# Patient Record
Sex: FEMALE | Race: BLACK OR AFRICAN AMERICAN | Employment: OTHER | URBAN - METROPOLITAN AREA
[De-identification: names, ages, dates, MRNs, and addresses within clinical notes are randomized per-mention and may not be internally consistent; named-entity substitution may affect disease eponyms.]

---

## 2017-01-20 ENCOUNTER — CLINICAL SUPPORT (OUTPATIENT)
Dept: CARDIOLOGY CLINIC | Age: 62
End: 2017-01-20

## 2017-01-20 DIAGNOSIS — I48.91 ATRIAL FIBRILLATION, UNSPECIFIED TYPE (HCC): Primary | ICD-10-CM

## 2017-01-20 DIAGNOSIS — Z79.01 LONG TERM (CURRENT) USE OF ANTICOAGULANTS: ICD-10-CM

## 2017-02-06 ENCOUNTER — TELEPHONE (OUTPATIENT)
Dept: CARDIOLOGY CLINIC | Age: 62
End: 2017-02-06

## 2017-02-06 NOTE — TELEPHONE ENCOUNTER
Called patient. She gave me phone # to PCP. I called them for reference #. Ref # [id number]. Also had it faxed to our fax # at the nursing station. Called patient and let her know it was received. Patient verbalizes understanding and denies further questions or concerns.

## 2017-02-06 NOTE — TELEPHONE ENCOUNTER
Patient called regarding ref that was sent from PCP. She stated that they told her they faxed multiple times.  She would like for someone to call her at [phone]

## 2017-02-07 ENCOUNTER — CLINICAL SUPPORT (OUTPATIENT)
Dept: CARDIOLOGY CLINIC | Age: 62
End: 2017-02-07

## 2017-02-07 ENCOUNTER — OFFICE VISIT (OUTPATIENT)
Dept: CARDIOLOGY CLINIC | Age: 62
End: 2017-02-07

## 2017-02-07 VITALS
BODY MASS INDEX: 24.59 KG/M2 | WEIGHT: 114 LBS | RESPIRATION RATE: 16 BRPM | DIASTOLIC BLOOD PRESSURE: 62 MMHG | OXYGEN SATURATION: 97 % | HEIGHT: 57 IN | HEART RATE: 67 BPM | SYSTOLIC BLOOD PRESSURE: 110 MMHG

## 2017-02-07 DIAGNOSIS — E78.00 PURE HYPERCHOLESTEROLEMIA: ICD-10-CM

## 2017-02-07 DIAGNOSIS — Z79.01 LONG TERM (CURRENT) USE OF ANTICOAGULANTS: ICD-10-CM

## 2017-02-07 DIAGNOSIS — I25.10 CORONARY ARTERY DISEASE INVOLVING NATIVE CORONARY ARTERY OF NATIVE HEART WITHOUT ANGINA PECTORIS: Primary | ICD-10-CM

## 2017-02-07 DIAGNOSIS — I48.91 ATRIAL FIBRILLATION, UNSPECIFIED TYPE (HCC): Primary | ICD-10-CM

## 2017-02-07 DIAGNOSIS — I48.0 PAROXYSMAL ATRIAL FIBRILLATION (HCC): ICD-10-CM

## 2017-02-07 DIAGNOSIS — I10 ESSENTIAL HYPERTENSION, BENIGN: ICD-10-CM

## 2017-02-07 LAB
INR BLD: NORMAL
INR, EXTERNAL: 2.4 (ref 2–3)
PT POC: NORMAL SEC
VALID INTERNAL CONTROL?: YES

## 2017-02-07 NOTE — MR AVS SNAPSHOT
Visit Information Date & Time Provider Department Dept. Phone Encounter #  
 2/7/2017  3:20 PM Jason Tanner MD [facility] Ashley Gomez [phone] [id number] Follow-up Instructions Return in about 6 months (around 8/7/2017). Your Appointments 2/8/2017  2:00 PM  
New Patient with Madai Enriquez MD  
[facility] [address]) Appt Note: New Pt est PCP; r/s $0cp ls11/3/16; New Pt est PCP  
 [address] [place] [address] [address] [place] [id number]  
[phone]  
  
   
 [address] [facility] [address] [facility] Upcoming Health Maintenance Date Due Pneumococcal 19-64 Highest Risk (2 of 3 - PCV13) 1/1/2011 ZOSTER VACCINE AGE 60> 12/19/2013 PAP AKA CERVICAL CYTOLOGY 4/17/2016 COLONOSCOPY 12/27/2016 BREAST CANCER SCRN MAMMOGRAM 11/17/2017 DTaP/Tdap/Td series (2 - Td) 3/24/2025 Allergies as of 2/7/2017  Review Complete On: 2/7/2017 By: Jason Tanner MD  
  
 Severity Noted Reaction Type Reactions Amoxicillin Low 10/08/2013   Side Effect Diarrhea  
 1 day after Doxycycline Doxycycline Low 09/21/2013   Side Effect Diarrhea  
 100mg single dose \"too strong\" and caused diarrhea Current Immunizations  Reviewed on 5/5/2016 Name Date Influenza Vaccine 11/1/2015, 9/20/2013 Influenza Vaccine (Quad) PF 10/5/2016 Influenza Vaccine Split 9/1/2012 Influenza Vaccine Whole 9/1/2010 Pneumococcal Vaccine (Unspecified Type) 1/1/2010 Tdap 3/24/2015 Not reviewed this visit You Were Diagnosed With   
  
 Codes Comments Coronary artery disease involving native coronary artery of native heart without angina pectoris    -  Primary ICD-10-CM: I25.10 ICD-9-CM: 414.01 Paroxysmal atrial fibrillation (HCC)     ICD-10-CM: I48.0 ICD-9-CM: 427.31 Essential hypertension, benign     ICD-10-CM: I10 
ICD-9-CM: 401.1 Pure hypercholesterolemia     ICD-10-CM: E78.00 ICD-9-CM: 272.0 Vitals BP Pulse Resp Height(growth percentile) Weight(growth percentile) SpO2  
 110/62 (BP 1 Location: Left arm, BP Patient Position: Sitting) 67 16 4' 8.5\" (1.435 m) 114 lb (51.7 kg) 97% BMI OB Status Smoking Status 25.11 kg/m2 Hysterectomy Former Smoker BMI and BSA Data Body Mass Index Body Surface Area  
 25.11 kg/m 2 1.44 m 2 Preferred Pharmacy Pharmacy Name Phone [facility] [place] [id number] [phone] Your Updated Medication List  
  
   
This list is accurate as of: 2/7/17  3:28 PM.  Always use your most recent med list. amLODIPine 5 mg tablet Commonly known as:  Kuo Demario TAKE ONE TABLET BY MOUTH DAILY FOR BLOOD PRESSURE  
  
 aspirin 81 mg tablet Take 81 mg by mouth daily. * atorvastatin 80 mg tablet Commonly known as:  LIPITOR Take 1 Tab by mouth daily. * atorvastatin 80 mg tablet Commonly known as:  LIPITOR Take 1 Tab by mouth daily. digoxin 0.125 mg tablet Commonly known as:  LANOXIN Take 1 Tab by mouth daily. zvlxunl-iymtaazgc-sscshpsqrsdh -325 mg capsule Commonly known as:  Mitch Falter Take 1 Cap by mouth.  
  
 lansoprazole 30 mg capsule Commonly known as:  PREVACID Take 1 Cap by mouth Daily (before breakfast). Indications: HEARTBURN  
  
 lisinopril 40 mg tablet Commonly known as:  Esme Ariton Take 1 Tab by mouth daily. metoprolol tartrate 50 mg tablet Commonly known as:  LOPRESSOR Take 1 Tab by mouth three (3) times daily. rosuvastatin 40 mg tablet Commonly known as:  CRESTOR Take 1 Tab by mouth nightly. warfarin 4 mg tablet Commonly known as:  COUMADIN Take 1 Tab by mouth daily. zolpidem 5 mg tablet Commonly known as:  AMBIEN Take 5 mg by mouth nightly as needed for Sleep. * Notice:   This list has 2 medication(s) that are the same as other medications prescribed for you. Read the directions carefully, and ask your doctor or other care provider to review them with you. Follow-up Instructions Return in about 6 months (around 8/7/2017). Introducing [facility]! Anita Cullen introduces [employer] patient portal. Now you can access parts of your medical record, email your doctor's office, and request medication refills online. 1. In your internet browser, go to [email or web address] 2. Click on the First Time User? Click Here link in the Sign In box. You will see the New Member Sign Up page. 3. Enter your [employer] Access Code exactly as it appears below. You will not need to use this code after youve completed the sign-up process. If you do not sign up before the expiration date, you must request a new code. · [employer] Access Code: [email or web address] Expires: 5/8/2017  3:28 PM 
 
4. Enter the last four digits of your Social Security Number (xxxx) and Date of Birth (mm/dd/yyyy) as indicated and click Submit. You will be taken to the next sign-up page. 5. Create a [employer] ID. This will be your [employer] login ID and cannot be changed, so think of one that is secure and easy to remember. 6. Create a [employer] password. You can change your password at any time. 7. Enter your Password Reset Question and Answer. This can be used at a later time if you forget your password. 8. Enter your e-mail address. You will receive e-mail notification when new information is available in [address] 9. Click Sign Up. You can now view and download portions of your medical record. 10. Click the Download Summary menu link to download a portable copy of your medical information. If you have questions, please visit the Frequently Asked Questions section of the [employer] website. Remember, [employer] is NOT to be used for urgent needs. For medical emergencies, dial 911. Now available from your iPhone and Android! Please provide this summary of care documentation to your next provider. Your primary care clinician is listed as Isabel Stephens. If you have any questions after today's visit, please call [phone].

## 2017-02-07 NOTE — PROGRESS NOTES
HISTORY OF PRESENT ILLNESS  Reva Funes is a 59 y.o. female     SUMMARY:   Problem List  Date Reviewed: 2/7/2017          Codes Class Noted    MVP (mitral valve prolapse) ICD-10-CM: I34.1  ICD-9-CM: 424.0  6/4/2010    Overview Addendum 1/13/2011  8:11 AM by Aria Clark MD     Date of most recent echocardiogram was 12/23/08. Echocardiogram demonstrated normal LV wall motion and ejection fraction, left atrial enlargement, moderate mitral regurgitation and severe mitral regurgitation. The anterior leaflet was myxomatous and there was some mild prolapse             Pneumonia ICD-10-CM: J18.9  ICD-9-CM: 001  5/4/2016        Atrial fibrillation ([facility] ICD-10-CM: I48.91  ICD-9-CM: 427.31  5/4/2016        Alcohol abuse ICD-10-CM: F10.10  ICD-9-CM: 305.00  Unknown    Overview Signed 2/25/2016 10:43 PM by Leandra Cornejo     2/25/16 phone note             Elevated brain natriuretic peptide (BNP) level ICD-10-CM: R79.89  ICD-9-CM: 790.99  5/5/2015        Atrial myxoma ICD-10-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 Jamaal Gamble.              Cardiomyopathy ([facility] ICD-10-CM: I42.9  ICD-9-CM: 425.4  10/18/2013        Hyponatremia ICD-10-CM: E87.1  ICD-9-CM: 276.1  90/43/7572        Diastolic heart failure (HCC) ICD-10-CM: I50.30  ICD-9-CM: 428.30  10/17/2013        Back pain, thoracic ICD-10-CM: M54.6  ICD-9-CM: 724.1  9/25/2013        Elevated liver enzymes ICD-10-CM: R74.8  ICD-9-CM: 790.5  9/1/2013    Overview Signed 10/23/2013  6:07 PM by Valorie Cleary DO     AST, ALT, Alk P             CAD (coronary artery disease), native coronary artery ICD-10-CM: I25.10  ICD-9-CM: 414.01  6/22/2011    Overview Signed 6/22/2011 11:19 AM by Aria Clark MD     5/11 single vessel bypass to rca at time of mitral valve repair             Mitral regurgitation ICD-10-CM: I34.0  ICD-9-CM: 424.0  3/21/2011    Overview Addendum 6/22/2011 11:19 AM by Sobeida Chaney MD     Severe by RAKESH 4- leaflets both prolapse and do not coapt. 5/11 mitral valve repair with single svg to rca. Pure hypercholesterolemia ICD-10-CM: E78.00  ICD-9-CM: 272.0  10/19/2009        Essential hypertension, benign (Chronic) ICD-10-CM: I10  ICD-9-CM: 401.1  10/19/2009        Hematuria, unspecified ICD-10-CM: R31.9  ICD-9-CM: 599.70  10/19/2009        Diffuse cystic mastopathy ICD-10-CM: N60.19  ICD-9-CM: 610.1  10/19/2009        Osteopenia ICD-10-CM: M85.80  ICD-9-CM: 733.90  10/19/2009              Current Outpatient Prescriptions on File Prior to Visit   Medication Sig    digoxin (LANOXIN) 0.125 mg tablet Take 1 Tab by mouth daily.  rosuvastatin (CRESTOR) 40 mg tablet Take 1 Tab by mouth nightly.  lansoprazole (PREVACID) 30 mg capsule Take 1 Cap by mouth Daily (before breakfast). Indications: HEARTBURN    warfarin (COUMADIN) 4 mg tablet Take 1 Tab by mouth daily.  metoprolol tartrate (LOPRESSOR) 50 mg tablet Take 1 Tab by mouth three (3) times daily.  amLODIPine (NORVASC) 5 mg tablet TAKE ONE TABLET BY MOUTH DAILY FOR BLOOD PRESSURE    lisinopril (PRINIVIL, ZESTRIL) 40 mg tablet Take 1 Tab by mouth daily.  zolpidem (AMBIEN) 5 mg tablet Take 5 mg by mouth nightly as needed for Sleep.  aspirin 81 mg tablet Take 81 mg by mouth daily.  atorvastatin (LIPITOR) 80 mg tablet Take 1 Tab by mouth daily.  atorvastatin (LIPITOR) 80 mg tablet Take 1 Tab by mouth daily. No current facility-administered medications on file prior to visit.         CARDIOLOGY STUDIES TO DATE:  2/14 transthorasic echo lvef 45% brenda, intact mitral valve repair with mod mr, mod tr pa pressure 49mm, right atrial mass, sessile  3/14 RAKESH lvef 55% mild to mod mr, mild tr 2by 2cm right atrial mass near septum   5/16 echo normal lvef, brenda, normally functioning bioprosthetic mv, mild mr, mod tr pa pressure 67mm      Chief Complaint   Patient presents with    Irregular Heart Beat HPI : Ms. Oswaldo Gardner is doing well. She is taking all of her medications and keeping up with her Coumadin checks. She is retired from work but stays quite active with her family and takes care of her mother. CARDIAC ROS:   negative for chest pain, dyspnea, palpitations, syncope, orthopnea, paroxysmal nocturnal dyspnea, exertional chest pressure/discomfort, claudication, lower extremity edema    Family History   Problem Relation Age of Onset    Heart Surgery Mother      CABG    Hypertension Mother     High Cholesterol Mother     Cancer Father      bone    Diabetes Sister     High Cholesterol Brother     Cancer Maternal Grandmother        Past Medical History   Diagnosis Date    Alcohol abuse      2/25/16 phone note    Anasarca 10/18/13     per CT. Dr. Darshana Kendrick.  Atrial myxoma 06/06/14     Dr. Marielle Valencia.  CAD (coronary artery disease)     Diastolic heart failure ([facility] 10/15/13     Dr. Zabrina Addison.  Diverticulosis 12/27/11     Dr. Raul Vogel.  DJD (degenerative joint disease) of knee      Dr. Felisha Haas Elevated cholesterol     Elevated liver enzymes 09/2013     AST, ALT, Alk P    Heartburn     Hepatic steatosis 10/18/13    Hypertension     Low sodium 06/06/14     [facility]    Migraine     MVP (mitral valve prolapse)      w/MR  Dr. Lena Okeefe. Dr. Cameron Serrano. Dr. Marielle Valencia.  Osteopenia 03/04/08    Ovarian cyst, bilateral     PAF (paroxysmal atrial fibrillation) ([facility] 03/21/11     New. Dr. Rick Coleman. Dr. Cameron Serrano. Dr. Wild Valdez Severe mitral regurgitation by prior echocardiogram 03/21/11     Dr. Velia Diaz. Dr. Wild Valdez Uterine fibroid        GENERAL ROS:  A comprehensive review of systems was negative except for that written in the HPI.     Visit Vitals    /62 (BP 1 Location: Left arm, BP Patient Position: Sitting)    Pulse 67    Resp 16    Ht 4' 8.5\" (1.435 m)    Wt 114 lb (51.7 kg)    SpO2 97%    BMI 25.11 kg/m2       Wt Readings from Last 3 Encounters:   02/07/17 114 lb (51.7 kg)   10/26/16 114 lb 3.2 oz (51.8 kg)   10/05/16 113 lb 4.8 oz (51.4 kg)            BP Readings from Last 3 Encounters:   02/07/17 110/62   10/26/16 130/80   10/05/16 108/60       PHYSICAL EXAM  General appearance: alert, cooperative, no distress, appears stated age  Neck: supple, symmetrical, trachea midline, no adenopathy, no carotid bruit and no JVD  Lungs: clear to auscultation bilaterally  Heart: regular rate and rhythm, S1, S2 normal, no murmur, click, rub or gallop  Extremities: extremities normal, atraumatic, no cyanosis or edema    Lab Results   Component Value Date/Time    Cholesterol, total 223 10/20/2016 10:15 AM    Cholesterol, total 181 10/28/2015 10:27 AM    Cholesterol, total 192 03/27/2015 08:50 AM    Cholesterol, total 105 07/17/2014 04:24 AM    Cholesterol, total 145 06/18/2014 08:57 AM    HDL Cholesterol 57 10/20/2016 10:15 AM    HDL Cholesterol 68 10/28/2015 10:27 AM    HDL Cholesterol 61 03/27/2015 08:50 AM    HDL Cholesterol 45 07/17/2014 04:24 AM    HDL Cholesterol 53 06/18/2014 08:57 AM    LDL, calculated 154 10/20/2016 10:15 AM    LDL, calculated 94 10/28/2015 10:27 AM    LDL, calculated 111 03/27/2015 08:50 AM    LDL, calculated 45.2 07/17/2014 04:24 AM    LDL, calculated 79 06/18/2014 08:57 AM    Triglyceride 60 10/20/2016 10:15 AM    Triglyceride 96 10/28/2015 10:27 AM    Triglyceride 98 03/27/2015 08:50 AM    Triglyceride 74 07/17/2014 04:24 AM    Triglyceride 67 06/18/2014 08:57 AM    CHOL/HDL Ratio 2.3 07/17/2014 04:24 AM    CHOL/HDL Ratio 3.1 08/09/2011 04:25 AM     ASSESSMENT  Ms. Lily Rodriguez is stable, asymptomatic and well-compensated on a good medical regimen and needs no cardiac testing at this time. We will send her for a fasting lipid profile to see how she is doing on the Crestor.         current treatment plan is effective, no change in therapy  lab results and schedule of future lab studies reviewed with patient  reviewed diet, exercise and weight control    Encounter Diagnoses   Name Primary?  Coronary artery disease involving native coronary artery of native heart without angina pectoris Yes    Paroxysmal atrial fibrillation (HCC)     Essential hypertension, benign     Pure hypercholesterolemia      Orders Placed This Encounter    LIPID PANEL    METABOLIC PANEL, COMPREHENSIVE    gkuntmz-dfjdxzdud-ckoymtccclxm (MIDRIN) -325 mg capsule       Follow-up Disposition:  Return in about 4 months (around 6/7/2017).     Katie Ochoa MD  2/7/2017

## 2017-02-08 ENCOUNTER — OFFICE VISIT (OUTPATIENT)
Dept: INTERNAL MEDICINE CLINIC | Age: 62
End: 2017-02-08

## 2017-02-08 VITALS
OXYGEN SATURATION: 97 % | SYSTOLIC BLOOD PRESSURE: 123 MMHG | DIASTOLIC BLOOD PRESSURE: 75 MMHG | TEMPERATURE: 96.9 F | HEART RATE: 57 BPM | WEIGHT: 111.8 LBS | HEIGHT: 57 IN | RESPIRATION RATE: 20 BRPM | BODY MASS INDEX: 24.12 KG/M2

## 2017-02-08 DIAGNOSIS — M17.10 ARTHRITIS OF KNEE: ICD-10-CM

## 2017-02-08 DIAGNOSIS — M85.80 OSTEOPENIA: ICD-10-CM

## 2017-02-08 DIAGNOSIS — I10 ESSENTIAL HYPERTENSION, BENIGN: Chronic | ICD-10-CM

## 2017-02-08 DIAGNOSIS — I48.91 ATRIAL FIBRILLATION, UNSPECIFIED TYPE (HCC): Primary | ICD-10-CM

## 2017-02-08 DIAGNOSIS — E78.00 PURE HYPERCHOLESTEROLEMIA: ICD-10-CM

## 2017-02-08 DIAGNOSIS — I25.10 CORONARY ARTERY DISEASE INVOLVING NATIVE CORONARY ARTERY OF NATIVE HEART WITHOUT ANGINA PECTORIS: ICD-10-CM

## 2017-02-08 DIAGNOSIS — E55.9 VITAMIN D DEFICIENCY: ICD-10-CM

## 2017-02-08 RX ORDER — LANOLIN ALCOHOL/MO/W.PET/CERES
1 CREAM (GRAM) TOPICAL
Qty: 30 TAB | Refills: 12 | Status: SHIPPED | OUTPATIENT
Start: 2017-02-08 | End: 2018-05-04 | Stop reason: SDUPTHER

## 2017-02-08 NOTE — MR AVS SNAPSHOT
Visit Information Date & Time Provider Department Dept. Phone Encounter #  
 2/8/2017  2:00 PM Jose Carlos Armenta, [address] [facility] Medicine [phone] [id number] Your Appointments 3/8/2017 11:40 AM  
COUMADIN CLINIC with [facility] ([facility]AR[facility] SCHEDULING) Appt Note: 1 month coumadin check [address] [place] [address]  
[address]  
  
    
 6/6/2017  2:40 PM  
ESTABLISHED PATIENT with Fredo Jenkins MD  
[facility] ([facility] Appt Note: [address] [place] [address]  
[address] [address] Upcoming Health Maintenance Date Due Pneumococcal 19-64 Highest Risk (2 of 3 - PCV13) 1/1/2011 ZOSTER VACCINE AGE 60> 12/19/2013 PAP AKA CERVICAL CYTOLOGY 4/17/2016 COLONOSCOPY 12/27/2016 BREAST CANCER SCRN MAMMOGRAM 11/17/2017 DTaP/Tdap/Td series (2 - Td) 3/24/2025 Allergies as of 2/8/2017  Review Complete On: 2/8/2017 By: Jose Carlos Armenta MD  
  
 Severity Noted Reaction Type Reactions Amoxicillin Low 10/08/2013   Side Effect Diarrhea  
 1 day after Doxycycline Doxycycline Low 09/21/2013   Side Effect Diarrhea  
 100mg single dose \"too strong\" and caused diarrhea Current Immunizations  Reviewed on 5/5/2016 Name Date Influenza Vaccine 11/1/2015, 9/20/2013 Influenza Vaccine (Quad) PF 10/5/2016 Influenza Vaccine Split 9/1/2012 Influenza Vaccine Whole 9/1/2010 Pneumococcal Vaccine (Unspecified Type) 1/1/2010 Tdap 3/24/2015 Not reviewed this visit You Were Diagnosed With   
  
 Codes Comments Atrial fibrillation, unspecified type ([facility] [address]    -  Primary ICD-10-CM: I48.91 
ICD-9-CM: 427.31 Essential hypertension, benign     ICD-10-CM: I10 
ICD-9-CM: 401.1 Coronary artery disease involving native coronary artery of native heart without angina pectoris     ICD-10-CM: I25.10 ICD-9-CM: 414.01 Osteopenia     ICD-10-CM: M85.80 ICD-9-CM: 733.90 Pure hypercholesterolemia     ICD-10-CM: E78.00 ICD-9-CM: 272.0 Vitamin D deficiency     ICD-10-CM: E55.9 ICD-9-CM: 268.9 Arthritis of knee     ICD-10-CM: M19.90 ICD-9-CM: 716.96 Vitals BP Pulse Temp Resp Height(growth percentile) Weight(growth percentile) 123/75 (BP 1 Location: Left arm, BP Patient Position: Sitting) (!) 57 96.9 °F (36.1 °C) (Oral) 20 4' 8.5\" (1.435 m) 111 lb 12.8 oz (50.7 kg) SpO2 BMI OB Status Smoking Status 97% 24.62 kg/m2 Hysterectomy Former Smoker Vitals History BMI and BSA Data Body Mass Index Body Surface Area  
 24.62 kg/m 2 1.42 m 2 Preferred Pharmacy Pharmacy Name Phone [facility] [address] [address] [phone] Your Updated Medication List  
  
   
This list is accurate as of: 2/8/17  3:06 PM.  Always use your most recent med list. amLODIPine 5 mg tablet Commonly known as:  Noah Alstrom TAKE ONE TABLET BY MOUTH DAILY FOR BLOOD PRESSURE  
  
 aspirin 81 mg tablet Take 81 mg by mouth daily. atorvastatin 80 mg tablet Commonly known as:  LIPITOR Take 1 Tab by mouth daily. calcium citrate-vitamin d3 315-200 mg-unit Tab Commonly known as:  CITRACAL+D Take 1 Tab by mouth daily (with breakfast). digoxin 0.125 mg tablet Commonly known as:  LANOXIN Take 1 Tab by mouth daily. deloaxk-ggkxxmgky-pgzojmfpuusg -325 mg capsule Commonly known as:  Jolanta Peak Take 1 Cap by mouth.  
  
 lansoprazole 30 mg capsule Commonly known as:  PREVACID Take 1 Cap by mouth Daily (before breakfast). Indications: HEARTBURN  
  
 lisinopril 40 mg tablet Commonly known as:  Yulisa Loss Take 1 Tab by mouth daily. metoprolol tartrate 50 mg tablet Commonly known as:  LOPRESSOR Take 1 Tab by mouth three (3) times daily. rosuvastatin 40 mg tablet Commonly known as:  CRESTOR Take 1 Tab by mouth nightly. warfarin 4 mg tablet Commonly known as:  COUMADIN Take 1 Tab by mouth daily. zolpidem 5 mg tablet Commonly known as:  AMBIEN Take 5 mg by mouth nightly as needed for Sleep. Prescriptions Sent to Pharmacy Refills  
 calcium citrate-vitamin d3 (CITRACAL+D) 315-200 mg-unit tab 12 Sig: Take 1 Tab by mouth daily (with breakfast). Class: Normal  
 Pharmacy: [facility] [address], [address] [facility] #: [phone] Route: Oral  
  
We Performed the Following CBC WITH AUTOMATED DIFF [89512 CPT(R)] LIPID PANEL [66222 CPT(R)] METABOLIC PANEL, COMPREHENSIVE [59931 CPT(R)] URINALYSIS W/ RFLX MICROSCOPIC [84773 CPT(R)] VITAMIN D, 25 HYDROXY [id number] CPT(R)] To-Do List   
 05/08/2017 Imaging:  DEXA BONE DENSITY STUDY AXIAL Patient Instructions Knee Arthritis: Exercises Your Care Instructions Here are some examples of exercises for knee arthritis. Start each exercise slowly. Ease off the exercise if you start to have pain. Your doctor or physical therapist will tell you when you can start these exercises and which ones will work best for you. How to do the exercises Knee flexion with heel slide 1. Lie on your back with your knees bent. 2. Slide your heel back by bending your affected knee as far as you can. Then hook your other foot around your ankle to help pull your heel even farther back. 3. Hold for about 6 seconds, then rest for up to 10 seconds. 4. Repeat 8 to 12 times. 5. Switch legs and repeat steps 1 through 4, even if only one knee is sore. [facility] [facility] 1. Sit with your affected leg straight and supported on the floor or a firm bed. Place a small, rolled-up towel under your knee.  Your other leg should be bent, with that foot flat on the floor. 2. Tighten the thigh muscles of your affected leg by pressing the back of your knee down into the towel. 3. Hold for about 6 seconds, then rest for up to 10 seconds. 4. Repeat 8 to 12 times. 5. Switch legs and repeat steps 1 through 4, even if only one knee is sore. Straight-leg raises to the front 1. Lie on your back with your good knee bent so that your foot rests flat on the floor. Your affected leg should be straight. Make sure that your low back has a normal curve. You should be able to slip your hand in between the floor and the small of your back, with your palm touching the floor and your back touching the back of your hand. 2. Tighten the thigh muscles in your affected leg by pressing the back of your knee flat down to the floor. Hold your knee straight. 3. Keeping the thigh muscles tight and your leg straight, lift your affected leg up so that your heel is about 12 inches off the floor. Hold for about 6 seconds, then lower slowly. 4. Relax for up to 10 seconds between repetitions. 5. Repeat 8 to 12 times. 6. Switch legs and repeat steps 1 through 5, even if only one knee is sore. Active knee flexion 1. Lie on your stomach with your knees straight. If your kneecap is uncomfortable, roll up a washcloth and put it under your leg just above your kneecap. 2. Lift the foot of your affected leg by bending the knee so that you bring the foot up toward your buttock. If this motion hurts, try it without bending your knee quite as far. This may help you avoid any painful motion. 3. Slowly move your leg up and down. 4. Repeat 8 to 12 times. 5. Switch legs and repeat steps 1 through 4, even if only one knee is sore. Quadriceps stretch (facedown) 1. Lie flat on your stomach, and rest your face on the floor. 2. Wrap a towel or belt strap around the lower part of your affected leg. Then use the towel or belt strap to slowly pull your heel toward your buttock until you feel a stretch. 3. Hold for about 15 to 30 seconds, then relax your leg against the towel or belt strap. 4. Repeat 2 to 4 times. 5. Switch legs and repeat steps 1 through 4, even if only one knee is sore. Stationary exercise bike If you do not have a stationary exercise bike at home, you can find one to ride at your local health club or community center. 1. Adjust the height of the bike seat so that your knee is slightly bent when your leg is extended downward. If your knee hurts when the pedal reaches the top, you can raise the seat so that your knee does not bend as much. 2. Start slowly. At first, try to do 5 to 10 minutes of cycling with little to no resistance. Then increase your time and the resistance bit by bit until you can do 20 to 30 minutes without pain. 3. If you start to have pain, rest your knee until your pain gets back to the level that is normal for you. Or cycle for less time or with less effort. Follow-up care is a key part of your treatment and safety. Be sure to make and go to all appointments, and call your doctor if you are having problems. It's also a good idea to know your test results and keep a list of the medicines you take. Where can you learn more? Go to http://payam-laurence.info/. Enter C159 in the search box to learn more about \"Knee Arthritis: Exercises. \" Current as of: May 23, 2016 Content Version: 11.1 © 0483-3703 [employer]. Care instructions adapted under license by [employer] (which disclaims liability or warranty for this information). If you have questions about a medical condition or this instruction, always ask your healthcare professional. [place] [address] any warranty or liability for your use of this information. Introducing [facility]! Quoc Douglas introduces [employer] patient portal. Now you can access parts of your medical record, email your doctor's office, and request medication refills online. 1. In your internet browser, go to [email or web address] 2. Click on the First Time User? Click Here link in the Sign In box. You will see the New Member Sign Up page. 3. Enter your [employer] Access Code exactly as it appears below. You will not need to use this code after youve completed the sign-up process. If you do not sign up before the expiration date, you must request a new code. · [employer] Access Code: [email or web address] Expires: 5/8/2017  3:28 PM 
 
4. Enter the last four digits of your Social Security Number (xxxx) and Date of Birth (mm/dd/yyyy) as indicated and click Submit. You will be taken to the next sign-up page. 5. Create a [employer] ID. This will be your [employer] login ID and cannot be changed, so think of one that is secure and easy to remember. 6. Create a [employer] password. You can change your password at any time. 7. Enter your Password Reset Question and Answer. This can be used at a later time if you forget your password. 8. Enter your e-mail address. You will receive e-mail notification when new information is available in [address] 9. Click Sign Up. You can now view and download portions of your medical record. 10. Click the Download Summary menu link to download a portable copy of your medical information. If you have questions, please visit the Frequently Asked Questions section of the [employer] website. Remember, [employer] is NOT to be used for urgent needs. For medical emergencies, dial 911. Now available from your iPhone and Android! Please provide this summary of care documentation to your next provider. Your primary care clinician is listed as Mecca Casper. If you have any questions after today's visit, please call [phone].

## 2017-02-08 NOTE — PROGRESS NOTES
HISTORY OF PRESENT ILLNESS  Kenia Storey is a 59 y.o. female here to establish care. She has CAD,S/P CABG,A fib and atrial mixoma. Seen by cardiologist.doing well. no chest pain or palpitation. has elevated lipid too. has F/H CAD. On crestor. need lab work. Has knee arthritis,has seen ortho in past.  Reports compliance with medications and diet. Med list and most recent labs/studies reviewed with pt. active physically to control weight. Needs med refills. Reports no other new c/o. HPI    Review of Systems   Constitutional: Negative. HENT: Negative. Eyes: Negative. Respiratory: Negative. Cardiovascular: Negative. Gastrointestinal: Negative. Genitourinary: Negative. Musculoskeletal: Positive for joint pain. Skin: Negative. Neurological: Negative. Psychiatric/Behavioral: Negative. Physical Exam   Constitutional: She is oriented to person, place, and time. She appears well-developed and well-nourished. No distress. HENT:   Head: Normocephalic and atraumatic. Right Ear: External ear normal.   Left Ear: External ear normal.   Nose: Nose normal.   Mouth/Throat: Oropharynx is clear and moist. No oropharyngeal exudate. Neck: Normal range of motion. Neck supple. No JVD present. No thyromegaly present. Cardiovascular: Normal rate, regular rhythm, normal heart sounds and intact distal pulses. Pulmonary/Chest: Effort normal and breath sounds normal. No respiratory distress. She has no wheezes. She has no rales. Abdominal: Soft. Bowel sounds are normal. She exhibits no distension. There is no tenderness. Musculoskeletal: Normal range of motion. She exhibits no edema or tenderness. Neurological: She is alert and oriented to person, place, and time. She has normal reflexes. She displays normal reflexes. No cranial nerve deficit. She exhibits normal muscle tone. Coordination normal.   Psychiatric: She has a normal mood and affect.  Her behavior is normal.       ASSESSMENT and Jeison Mane was seen today for new patient, hypertension, coronary artery disease, irregular heart beat and cholesterol problem. Diagnoses and all orders for this visit:    Atrial fibrillation, unspecified type ([address]  Rate controlled. On meds. on coumadin too. Managed by cardiologist.  Will do,  -     CBC WITH AUTOMATED DIFF  -     METABOLIC PANEL, COMPREHENSIVE    Essential hypertension, benign    Stable. Will do,  -     CBC WITH AUTOMATED DIFF  -     METABOLIC PANEL, COMPREHENSIVE  -     URINALYSIS W/ RFLX MICROSCOPIC    Coronary artery disease involving native coronary artery of native heart without angina pectoris      Had CABG. repeat cath was OK. On meds. and statin. Osteopenia    Will do,   -     DEXA BONE DENSITY STUDY AXIAL; Future  -     calcium citrate-vitamin d3 (CITRACAL+D) 315-200 mg-unit tab; Take 1 Tab by mouth daily (with breakfast). Pure hypercholesterolemia    On crestor. Will check,  -     LIPID PANEL  Cardiologist wants to change to lipitor 80 mg . Vitamin D deficiency  -     VITAMIN D, 25 HYDROXY    Arthritis of knee    Tylenol PRN. Discussed expected course/resolution/complications of diagnosis in detail with patient. Medication risks/benefits/costs/interactions/alternatives discussed with patient. Pt was given an after visit summary which includes diagnoses, current medications & vitals. Pt expressed understanding with the diagnosis and plan.

## 2017-02-08 NOTE — PROGRESS NOTES
Health Maintenance Due   Topic Date Due    Pneumococcal 19-64 Highest Risk (2 of 3 - PCV13) 01/01/2011    ZOSTER VACCINE AGE 60>  12/19/2013    PAP AKA CERVICAL CYTOLOGY  04/17/2016    COLONOSCOPY  12/27/2016       Chief Complaint   Patient presents with    New Patient    Hypertension     with hx of MVP and mitral valve regurg    Coronary Artery Disease    Irregular Heart Beat    Cholesterol Problem       1. Have you been to the ER, urgent care clinic since your last visit? Hospitalized since your last visit? No    2. Have you seen or consulted any other health care providers outside of the [address] [place] [address] since your last visit? Include any pap smears or colon screening. No    3) Do you have an Advance Directive on file? no    4) Are you interested in receiving information on Advance Directives? NO      Patient is accompanied by self I have received verbal consent from Arley Griffin to discuss any/all medical information while they are present in the room.

## 2017-02-08 NOTE — LETTER
6/19/2017 8:39 AM 
 
Ms. Jaime Valentin [address] [facility] [place] [id number] Dear Jaime Valentin: 
 
Please find your most recent results below. Resulted Orders CBC WITH AUTOMATED DIFF Result Value Ref Range WBC 4.8 3.4 - 10.8 x10E3/uL  
 RBC 4.36 3.77 - 5.28 x10E6/uL HGB 13.4 11.1 - 15.9 g/dL HCT 39.5 34.0 - 46.6 % MCV 91 79 - 97 fL  
 MCH 30.7 26.6 - 33.0 pg  
 MCHC 33.9 31.5 - 35.7 g/dL  
 RDW 14.0 12.3 - 15.4 % PLATELET 849 090 - 680 x10E3/uL NEUTROPHILS 43 % Lymphocytes 45 % MONOCYTES 7 % EOSINOPHILS 3 % BASOPHILS 2 %  
 ABS. NEUTROPHILS 2.1 1.4 - 7.0 x10E3/uL Abs Lymphocytes 2.2 0.7 - 3.1 x10E3/uL  
 ABS. MONOCYTES 0.4 0.1 - 0.9 x10E3/uL  
 ABS. EOSINOPHILS 0.2 0.0 - 0.4 x10E3/uL  
 ABS. BASOPHILS 0.1 0.0 - 0.2 x10E3/uL IMMATURE GRANULOCYTES 0 %  
 ABS. IMM. GRANS. 0.0 0.0 - 0.1 x10E3/uL Narrative Performed at:  [address]  [id number] : Murray Emery MD, Phone:  [phone] LIPID PANEL Result Value Ref Range Cholesterol, total 188 100 - 199 mg/dL Triglyceride 69 0 - 149 mg/dL HDL Cholesterol 50 >39 mg/dL VLDL, calculated 14 5 - 40 mg/dL LDL, calculated 124 (H) 0 - 99 mg/dL Narrative Performed at:  [address]  [id number] : Murray Emery MD, Phone:  [phone] METABOLIC PANEL, COMPREHENSIVE Result Value Ref Range Glucose 70 65 - 99 mg/dL BUN 6 (L) 8 - 27 mg/dL Creatinine 0.86 0.57 - 1.00 mg/dL GFR est non-AA 72 >59 mL/min/1.73 GFR est AA 83 >59 mL/min/1.73  
 BUN/Creatinine ratio 7 (L) 12 - 28 Sodium 126 (L) 134 - 144 mmol/L Potassium 4.6 3.5 - 5.2 mmol/L Chloride 90 (L) 96 - 106 mmol/L  
 CO2 23 18 - 29 mmol/L Calcium 9.3 8.7 - 10.3 mg/dL Protein, total 7.5 6.0 - 8.5 g/dL Albumin 4.1 3.6 - 4.8 g/dL GLOBULIN, TOTAL 3.4 1.5 - 4.5 g/dL A-G Ratio 1.2 1.2 - 2.2 Bilirubin, total 1.1 0.0 - 1.2 mg/dL Alk. phosphatase 146 (H) 39 - 117 IU/L  
 AST (SGOT) 25 0 - 40 IU/L  
 ALT (SGPT) 11 0 - 32 IU/L Narrative Performed at:  [address]  [id number] : Asmita Elias MD, Phone:  [phone] VITAMIN D, 25 HYDROXY Result Value Ref Range VITAMIN D, 25-HYDROXY 33.3 30.0 - 100.0 ng/mL Comment:  
   Vitamin D deficiency has been defined by the [facility] practice guideline as a 
level of serum 25-OH vitamin D less than 20 ng/mL (1,2). The Endocrine Society went on to further define vitamin D 
insufficiency as a level between 21 and 29 ng/mL (2). 1. IOM ([place] of Medicine). 2010. Dietary reference 
   intakes for calcium and D. [address] [place] [address]: The 
   [employer]. 2. Nayely MF, Armani NC, Lorelei AHMADI, et al. 
   Evaluation, treatment, and prevention of vitamin D 
   deficiency: an Endocrine Society clinical practice 
   guideline. JCEM. 2011 Jul; 96(7):1911-30. Narrative Performed at:  [address]  [id number] : Asmita Elias MD, Phone:  [phone] URINALYSIS W/ RFLX MICROSCOPIC Result Value Ref Range Specific Gravity 1.026 1.005 - 1.030  
 pH (UA) 5.5 5.0 - 7.5 Color Orange Yellow Appearance Clear Clear Leukocyte Esterase Trace (A) Negative Protein 2+ (A) Negative/Trace Glucose Negative Negative Ketone Trace (A) Negative Blood Negative Negative Bilirubin Negative Negative Urobilinogen 1.0 0.2 - 1.0 mg/dL Nitrites Negative Negative Microscopic Examination See additional order Comment:  
   Microscopic was indicated and was performed. Narrative Performed at:  [address]  [id number] : Asmita Elias MD, Phone:  [phone] MICROSCOPIC EXAMINATION  
 Result Value Ref Range WBC 0-5 0 - 5 /hpf  
 RBC 0-2 0 - 2 /hpf Epithelial cells 0-10 0 - 10 /hpf Casts Present (A) None seen /lpf Cast type Hyaline casts N/A Mucus Present Not Estab. Bacteria None seen None seen/Few Narrative Performed at:  [address]  [id number] : Latosha Shelley MD, Phone:  [phone] CVD REPORT Result Value Ref Range INTERPRETATION Note Comment:  
   Supplement report is available. Narrative Performed at:  [address] [place] [address] [address] [place] [address]  [id number] : Jesus Morales PhD, Phone:  [phone] RECOMMENDATIONS: 
Eliz Vargas your labs indicate that you have some protein in your urine, please drink more fluids and we will repeat another urinalysis in 1 month. I have enclosed that lab slip for your convenience. All other labs are stable Please call me if you have any questions: [phone] Sincerely, Hang Najera MD

## 2017-02-08 NOTE — PATIENT INSTRUCTIONS
Knee Arthritis: Exercises  Your Care Instructions  Here are some examples of exercises for knee arthritis. Start each exercise slowly. Ease off the exercise if you start to have pain. Your doctor or physical therapist will tell you when you can start these exercises and which ones will work best for you. How to do the exercises  Knee flexion with heel slide    1. Lie on your back with your knees bent. 2. Slide your heel back by bending your affected knee as far as you can. Then hook your other foot around your ankle to help pull your heel even farther back. 3. Hold for about 6 seconds, then rest for up to 10 seconds. 4. Repeat 8 to 12 times. 5. Switch legs and repeat steps 1 through 4, even if only one knee is sore. Quad sets    1. Sit with your affected leg straight and supported on the floor or a firm bed. Place a small, rolled-up towel under your knee. Your other leg should be bent, with that foot flat on the floor. 2. Tighten the thigh muscles of your affected leg by pressing the back of your knee down into the towel. 3. Hold for about 6 seconds, then rest for up to 10 seconds. 4. Repeat 8 to 12 times. 5. Switch legs and repeat steps 1 through 4, even if only one knee is sore. Straight-leg raises to the front    1. Lie on your back with your good knee bent so that your foot rests flat on the floor. Your affected leg should be straight. Make sure that your low back has a normal curve. You should be able to slip your hand in between the floor and the small of your back, with your palm touching the floor and your back touching the back of your hand. 2. Tighten the thigh muscles in your affected leg by pressing the back of your knee flat down to the floor. Hold your knee straight. 3. Keeping the thigh muscles tight and your leg straight, lift your affected leg up so that your heel is about 12 inches off the floor. Hold for about 6 seconds, then lower slowly.   4. Relax for up to 10 seconds between repetitions. 5. Repeat 8 to 12 times. 6. Switch legs and repeat steps 1 through 5, even if only one knee is sore. Active knee flexion    1. Lie on your stomach with your knees straight. If your kneecap is uncomfortable, roll up a washcloth and put it under your leg just above your kneecap. 2. Lift the foot of your affected leg by bending the knee so that you bring the foot up toward your buttock. If this motion hurts, try it without bending your knee quite as far. This may help you avoid any painful motion. 3. Slowly move your leg up and down. 4. Repeat 8 to 12 times. 5. Switch legs and repeat steps 1 through 4, even if only one knee is sore. Quadriceps stretch (facedown)    1. Lie flat on your stomach, and rest your face on the floor. 2. Wrap a towel or belt strap around the lower part of your affected leg. Then use the towel or belt strap to slowly pull your heel toward your buttock until you feel a stretch. 3. Hold for about 15 to 30 seconds, then relax your leg against the towel or belt strap. 4. Repeat 2 to 4 times. 5. Switch legs and repeat steps 1 through 4, even if only one knee is sore. Stationary exercise bike    If you do not have a stationary exercise bike at home, you can find one to ride at your local health club or community center. 1. Adjust the height of the bike seat so that your knee is slightly bent when your leg is extended downward. If your knee hurts when the pedal reaches the top, you can raise the seat so that your knee does not bend as much. 2. Start slowly. At first, try to do 5 to 10 minutes of cycling with little to no resistance. Then increase your time and the resistance bit by bit until you can do 20 to 30 minutes without pain. 3. If you start to have pain, rest your knee until your pain gets back to the level that is normal for you. Or cycle for less time or with less effort. Follow-up care is a key part of your treatment and safety.  Be sure to make and go to all appointments, and call your doctor if you are having problems. It's also a good idea to know your test results and keep a list of the medicines you take. Where can you learn more? Go to http://payam-laurence.info/. Enter C159 in the search box to learn more about \"Knee Arthritis: Exercises. \"  Current as of: May 23, 2016  Content Version: 11.1  © 6214-8278 [employer] "[employer]. Care instructions adapted under license by [employer] (which disclaims liability or warranty for this information). If you have questions about a medical condition or this instruction, always ask your healthcare professional. [address] any warranty or liability for your use of this information.

## 2017-02-21 ENCOUNTER — HOSPITAL ENCOUNTER (OUTPATIENT)
Dept: MAMMOGRAPHY | Age: 62
Discharge: HOME | End: 2017-02-21
Attending: INTERNAL MEDICINE
Payer: COMMERCIAL

## 2017-02-21 DIAGNOSIS — M85.80 OSTEOPENIA: ICD-10-CM

## 2017-02-21 PROCEDURE — 77080 DXA BONE DENSITY AXIAL: CPT

## 2017-02-21 NOTE — LETTER
2/27/2017 9:23 AM 
 
Ms. Aydee Cooper [address] [facility] [address] Dear Aydee Cooper: 
 
Please find your most recent results below. Resulted Orders DEXA BONE DENSITY STUDY AXIAL Narrative Bone Mineral Density Indication:  F/U osteopenia Age: 59 Sex: Female. Menopause status: Postmenopausal. 
Hormone replacement therapy: No  
 
Number of falls in the past year:   None. Risk factors for osteoporosis:  None. Current medication for osteoporosis: None. Comparison: None. Technique: Imaging was performed on the [address] [place] [address] 
meta-analysis fracture risk calculator (FRAX) analysis was performed for 10 year 
fracture risk probability assessment Excluded sites: None Findings: 
  
 
Femoral Neck:  Left Bone mineral density (gm/cm2):? 0.819 
% of peak bone mass: 79 
% for age matched controls:? 88 
T-score: -1.6 Z-score: -0.8 Total Hip: Left Bone mineral density (gm/cm2):  0.839 
% of peak bone mass:   83 
% for age matched controls:  80 
T-score:   -1.3 Z-score:  -0.9 Lumbar Spine:  L1-L4 Bone mineral density (gm/cm2):  0.949 
% of peak bone mass:  80  
% for age matched controls:  80 
T-score:  -2.0 Z-score:  -0.7 Impression Impression: This patient is osteopenic using the World Health Organization criteria 10 year probability of major osteoporotic fracture:  4% 
10 year probability of hip fracture:  0.7% Recommendations: 
Therapy recommendations need to be tailored to each individual patient. Using 
the [address]) FRAX absolute fracture algorithm, the 
[address] [place] [address] recommends beginning pharmacological therapy in 
postmenopausal women and men over the age of 48 with a 8 year probability of a 
hip fracture of >3% OR with the 10 year probability of a major osteoporotic 
fracture of >20%. Please reconsider testing based on risk factors.  Currently, Medicare will only 
reimburse for a central DXA examination every two years, unless the patient is 
on chronic glucocorticoid therapy. Note: Please note that reliable, valid comparisons cannot be made between 
studies which have been performed on machines from different manufacturers. If 
clinically warranted, a follow up study performed at this site, on the same 
unit, would allow the most sensitive assessment of change in bone mineral 
density. RECOMMENDATIONS: 
Mary Jo Johnson  recent Dexa Bone Density test showed Osteopenia (thinning of the bones). I recommend for you to take Caltrate 600 mg one tablet twice per day. Caltrate is sold over the counter and you may ask the pharmacist if they have a generic of the Caltrate. Please call me if you have any questions. Sincerely, Please call me if you have any questions: [phone] Sincerely, [facility] DEXA 1

## 2017-03-09 ENCOUNTER — CLINICAL SUPPORT (OUTPATIENT)
Dept: CARDIOLOGY CLINIC | Age: 62
End: 2017-03-09

## 2017-03-09 DIAGNOSIS — I48.0 PAROXYSMAL ATRIAL FIBRILLATION (HCC): Primary | ICD-10-CM

## 2017-03-09 DIAGNOSIS — Z79.01 LONG TERM (CURRENT) USE OF ANTICOAGULANTS: ICD-10-CM

## 2017-03-09 LAB
INR BLD: NORMAL
INR, EXTERNAL: 2.2 (ref 2–3)
PT POC: NORMAL SEC
VALID INTERNAL CONTROL?: YES

## 2017-05-16 DIAGNOSIS — I34.1 MVP (MITRAL VALVE PROLAPSE): ICD-10-CM

## 2017-05-16 DIAGNOSIS — I10 ESSENTIAL HYPERTENSION, BENIGN: ICD-10-CM

## 2017-05-16 RX ORDER — LISINOPRIL 40 MG/1
40 TABLET ORAL DAILY
Qty: 90 TAB | Refills: 2 | Status: SHIPPED | OUTPATIENT
Start: 2017-05-16 | End: 2017-05-17 | Stop reason: SDUPTHER

## 2017-05-16 NOTE — TELEPHONE ENCOUNTER
Requested Prescriptions     Signed Prescriptions Disp Refills    lisinopril (PRINIVIL, ZESTRIL) 40 mg tablet 90 Tab 2     Sig: Take 1 Tab by mouth daily.      Authorizing Provider: Radha Cornejo     Ordering User: Antonieta Caba    Per Dr. Arun Senior verbal orders

## 2017-05-23 DIAGNOSIS — E87.1 HYPONATREMIA: ICD-10-CM

## 2017-05-23 DIAGNOSIS — I48.0 PAROXYSMAL ATRIAL FIBRILLATION (HCC): ICD-10-CM

## 2017-05-23 DIAGNOSIS — I25.10 CORONARY ARTERY DISEASE INVOLVING NATIVE CORONARY ARTERY OF NATIVE HEART WITHOUT ANGINA PECTORIS: ICD-10-CM

## 2017-05-23 DIAGNOSIS — I10 ESSENTIAL HYPERTENSION, BENIGN: ICD-10-CM

## 2017-05-23 RX ORDER — WARFARIN 4 MG/1
TABLET ORAL
Qty: 60 TAB | Refills: 0 | Status: SHIPPED | OUTPATIENT
Start: 2017-05-23 | End: 2017-08-10 | Stop reason: SDUPTHER

## 2017-06-08 ENCOUNTER — OFFICE VISIT (OUTPATIENT)
Dept: INTERNAL MEDICINE CLINIC | Age: 62
End: 2017-06-08

## 2017-06-08 VITALS
OXYGEN SATURATION: 98 % | HEART RATE: 52 BPM | HEIGHT: 57 IN | TEMPERATURE: 96.7 F | RESPIRATION RATE: 14 BRPM | SYSTOLIC BLOOD PRESSURE: 113 MMHG | WEIGHT: 108 LBS | BODY MASS INDEX: 23.3 KG/M2 | DIASTOLIC BLOOD PRESSURE: 58 MMHG

## 2017-06-08 DIAGNOSIS — M85.89 OSTEOPENIA OF MULTIPLE SITES: ICD-10-CM

## 2017-06-08 DIAGNOSIS — I25.10 CORONARY ARTERY DISEASE INVOLVING NATIVE CORONARY ARTERY OF NATIVE HEART WITHOUT ANGINA PECTORIS: ICD-10-CM

## 2017-06-08 DIAGNOSIS — I48.0 PAROXYSMAL ATRIAL FIBRILLATION (HCC): ICD-10-CM

## 2017-06-08 DIAGNOSIS — R00.1 BRADYCARDIA: ICD-10-CM

## 2017-06-08 DIAGNOSIS — I10 ESSENTIAL HYPERTENSION, BENIGN: Primary | Chronic | ICD-10-CM

## 2017-06-08 NOTE — PATIENT INSTRUCTIONS

## 2017-06-08 NOTE — MR AVS SNAPSHOT
Visit Information Date & Time Provider Department Dept. Phone Encounter #  
 6/8/2017  1:15 PM Narciso Feng MD [facility] [phone] [id number] Your Appointments 6/12/2017 10:40 AM  
ESTABLISHED PATIENT with Rebekah Ordaz MD  
[facility] ([facility]) Appt Note: 4 month f/u per dr Roosevelt Arndt; 4 month follow up; 4 month f/u per dr Roosevelt Arndt pt r/s from 6/6  
 [address] [place] [address] [address] [facility],[address] [facility] 7 [id number] Upcoming Health Maintenance Date Due ZOSTER VACCINE AGE 60> 12/19/2013 PAP AKA CERVICAL CYTOLOGY 4/17/2016 COLONOSCOPY 12/27/2016 INFLUENZA AGE 9 TO ADULT 8/1/2017 BREAST CANCER SCRN MAMMOGRAM 11/17/2017 DTaP/Tdap/Td series (2 - Td) 3/24/2025 Allergies as of 6/8/2017  Review Complete On: 6/8/2017 By: Narciso Feng MD  
  
 Severity Noted Reaction Type Reactions Amoxicillin Low 10/08/2013   Side Effect Diarrhea  
 1 day after Doxycycline Doxycycline Low 09/21/2013   Side Effect Diarrhea  
 100mg single dose \"too strong\" and caused diarrhea Current Immunizations  Reviewed on 6/8/2017 Name Date Influenza Vaccine 11/1/2015, 9/20/2013 Influenza Vaccine (Quad) PF 10/5/2016 Influenza Vaccine Split 9/1/2012 Influenza Vaccine Whole 9/1/2010 Pneumococcal Vaccine (Unspecified Type) 1/1/2010 Tdap 3/24/2015 Reviewed by Narciso Feng MD on 6/8/2017 at  1:38 PM  
You Were Diagnosed With   
  
 Codes Comments Essential hypertension, benign    -  Primary ICD-10-CM: I10 
ICD-9-CM: 401.1 Coronary artery disease involving native coronary artery of native heart without angina pectoris     ICD-10-CM: I25.10 ICD-9-CM: 414.01 Paroxysmal atrial fibrillation (HCC)     ICD-10-CM: I48.0 ICD-9-CM: 427.31 Osteopenia of multiple sites     ICD-10-CM: M85.89 ICD-9-CM: 733.90 Vitals BP Pulse Temp Resp Height(growth percentile) Weight(growth percentile) 113/58 (BP 1 Location: Left arm, BP Patient Position: Sitting) (!) 52 96.7 °F (35.9 °C) (Oral) 14 4' 8.5\" (1.435 m) 108 lb (49 kg) SpO2 BMI OB Status Smoking Status 98% 23.79 kg/m2 Hysterectomy Former Smoker Vitals History BMI and BSA Data Body Mass Index Body Surface Area  
 23.79 kg/m 2 1.4 m 2 Preferred Pharmacy Pharmacy Name Phone [facility] [phone] Your Updated Medication List  
  
   
This list is accurate as of: 6/8/17  1:40 PM.  Always use your most recent med list. amLODIPine 5 mg tablet Commonly known as:  Eward Veronique TAKE ONE TABLET BY MOUTH DAILY FOR BLOOD PRESSURE  
  
 aspirin 81 mg tablet Take 81 mg by mouth daily. atorvastatin 80 mg tablet Commonly known as:  LIPITOR Take 1 Tab by mouth daily. calcium citrate-vitamin d3 315-200 mg-unit Tab Commonly known as:  CITRACAL+D Take 1 Tab by mouth daily (with breakfast). digoxin 0.125 mg tablet Commonly known as:  LANOXIN Take 1 Tab by mouth daily. getyuzs-odrknvhnk-aqgwtvcfclgj -325 mg capsule Commonly known as:  Dolly Marcelle Take 1 Cap by mouth.  
  
 lansoprazole 30 mg capsule Commonly known as:  PREVACID Take 1 Cap by mouth Daily (before breakfast). Indications: HEARTBURN  
  
 lisinopril 40 mg tablet Commonly known as:  Inna Gaster Take 1 Tab by mouth daily. metoprolol tartrate 50 mg tablet Commonly known as:  LOPRESSOR Take 1 Tab by mouth three (3) times daily. rosuvastatin 40 mg tablet Commonly known as:  CRESTOR Take 1 Tab by mouth nightly. warfarin 4 mg tablet Commonly known as:  COUMADIN  
2 tablets daily or as directed by the coumadin clinic.  
  
 zolpidem 5 mg tablet Commonly known as:  AMBIEN Take 5 mg by mouth nightly as needed for Sleep. Patient Instructions DASH Diet: Care Instructions Your Care Instructions The DASH diet is an eating plan that can help lower your blood pressure. DASH stands for Dietary Approaches to Stop Hypertension. Hypertension is high blood pressure. The DASH diet focuses on eating foods that are high in calcium, potassium, and magnesium. These nutrients can lower blood pressure. The foods that are highest in these nutrients are fruits, vegetables, low-fat dairy products, nuts, seeds, and legumes. But taking calcium, potassium, and magnesium supplements instead of eating foods that are high in those nutrients does not have the same effect. The DASH diet also includes whole grains, fish, and poultry. The DASH diet is one of several lifestyle changes your doctor may recommend to lower your high blood pressure. Your doctor may also want you to decrease the amount of sodium in your diet. Lowering sodium while following the DASH diet can lower blood pressure even further than just the DASH diet alone. Follow-up care is a key part of your treatment and safety. Be sure to make and go to all appointments, and call your doctor if you are having problems. It's also a good idea to know your test results and keep a list of the medicines you take. How can you care for yourself at home? Following the DASH diet · Eat 4 to 5 servings of fruit each day. A serving is 1 medium-sized piece of fruit, ½ cup chopped or canned fruit, 1/4 cup dried fruit, or 4 ounces (½ cup) of fruit juice. Choose fruit more often than fruit juice. · Eat 4 to 5 servings of vegetables each day. A serving is 1 cup of lettuce or raw leafy vegetables, ½ cup of chopped or cooked vegetables, or 4 ounces (½ cup) of vegetable juice. Choose vegetables more often than vegetable juice. · Get 2 to 3 servings of low-fat and fat-free dairy each day. A serving is 8 ounces of milk, 1 cup of yogurt, or 1 ½ ounces of cheese. · Eat 6 to 8 servings of grains each day. A serving is 1 slice of bread, 1 ounce of dry cereal, or ½ cup of cooked rice, pasta, or cooked cereal. Try to choose whole-grain products as much as possible. · Limit lean meat, poultry, and fish to 2 servings each day. A serving is 3 ounces, about the size of a deck of cards. · Eat 4 to 5 servings of nuts, seeds, and legumes (cooked dried beans, lentils, and split peas) each week. A serving is 1/3 cup of nuts, 2 tablespoons of seeds, or ½ cup of cooked beans or peas. · Limit fats and oils to 2 to 3 servings each day. A serving is 1 teaspoon of vegetable oil or 2 tablespoons of salad dressing. · Limit sweets and added sugars to 5 servings or less a week. A serving is 1 tablespoon jelly or jam, ½ cup sorbet, or 1 cup of lemonade. · Eat less than 2,300 milligrams (mg) of sodium a day. If you limit your sodium to 1,500 mg a day, you can lower your blood pressure even more. Tips for success · Start small. Do not try to make dramatic changes to your diet all at once. You might feel that you are missing out on your favorite foods and then be more likely to not follow the plan. Make small changes, and stick with them. Once those changes become habit, add a few more changes. · Try some of the following: ¨ Make it a goal to eat a fruit or vegetable at every meal and at snacks. This will make it easy to get the recommended amount of fruits and vegetables each day. ¨ Try yogurt topped with fruit and nuts for a snack or healthy dessert. ¨ Add lettuce, tomato, cucumber, and onion to sandwiches. ¨ Combine a ready-made pizza crust with low-fat mozzarella cheese and lots of vegetable toppings. Try using tomatoes, squash, spinach, broccoli, carrots, cauliflower, and onions. ¨ Have a variety of cut-up vegetables with a low-fat dip as an appetizer instead of chips and dip. ¨ Sprinkle sunflower seeds or chopped almonds over salads.  Or try adding chopped walnuts or almonds to cooked vegetables. ¨ Try some vegetarian meals using beans and peas. Add garbanzo or kidney beans to salads. Make burritos and tacos with mashed sheppard beans or black beans. Where can you learn more? Go to http://payam-laurence.info/. Enter IWyatt in the search box to learn more about \"DASH Diet: Care Instructions. \" Current as of: March 23, 2016 Content Version: 11.2 © 8159-7003 [employer]. Care instructions adapted under license by [employer] (which disclaims liability or warranty for this information). If you have questions about a medical condition or this instruction, always ask your healthcare professional. [address] any warranty or liability for your use of this information. Introducing [facility]! Keisha Marrero introduces [employer] patient portal. Now you can access parts of your medical record, email your doctor's office, and request medication refills online. 1. In your internet browser, go to [email or web address] 2. Click on the First Time User? Click Here link in the Sign In box. You will see the New Member Sign Up page. 3. Enter your [employer] Access Code exactly as it appears below. You will not need to use this code after youve completed the sign-up process. If you do not sign up before the expiration date, you must request a new code. · [employer] Access Code: [email or web address] Expires: 9/6/2017  1:40 PM 
 
4. Enter the last four digits of your Social Security Number (xxxx) and Date of Birth (mm/dd/yyyy) as indicated and click Submit. You will be taken to the next sign-up page. 5. Create a Business e via Italyt ID. This will be your [employer] login ID and cannot be changed, so think of one that is secure and easy to remember. 6. Create a [employer] password. You can change your password at any time. 7. Enter your Password Reset Question and Answer.  This can be used at a later time if you forget your password. 8. Enter your e-mail address. You will receive e-mail notification when new information is available in [address] 9. Click Sign Up. You can now view and download portions of your medical record. 10. Click the Download Summary menu link to download a portable copy of your medical information. If you have questions, please visit the Frequently Asked Questions section of the [employer] website. Remember, [employer] is NOT to be used for urgent needs. For medical emergencies, dial 911. Now available from your iPhone and Android! Please provide this summary of care documentation to your next provider. Your primary care clinician is listed as Johana Spaulding. If you have any questions after today's visit, please call [phone].

## 2017-06-08 NOTE — PROGRESS NOTES
1. Have you been to the ER, urgent care clinic since your last visit? Hospitalized since your last visit? No    2. Have you seen or consulted any other health care providers outside of the [facility] since your last visit? Include any pap smears or colon screening.  No     Chief Complaint   Patient presents with    Hypertension    Cholesterol Problem    Irregular Heart Beat     fasting

## 2017-06-08 NOTE — PROGRESS NOTES
HISTORY OF PRESENT ILLNESS  Armando Don is a 59 y.o. female here for follow up.doign well.has CAD and HTN,on med.scompalint with meds. No chest pain or SOB. Has A fib,on coumadin,asking if she has to take it life long. no bleeding episode. Noticed to have slow heart rate. No dizziness or LOC. She is on high dose ot metoprolol 3 times a day. She will see cardiologist soon. all labs reviewed. HPI    Review of Systems   Constitutional: Negative. HENT: Negative. Eyes: Negative. Respiratory: Negative. Cardiovascular: Negative. Gastrointestinal: Negative. Genitourinary: Negative. Skin: Negative. Neurological: Negative. Psychiatric/Behavioral: Negative. Physical Exam   Constitutional: She appears well-developed and well-nourished. No distress. Neck: Normal range of motion. Neck supple. No JVD present. No thyromegaly present. Cardiovascular: Normal rate, regular rhythm, normal heart sounds and intact distal pulses. Pulmonary/Chest: Effort normal and breath sounds normal. No respiratory distress. She has no wheezes. Musculoskeletal: She exhibits no edema or tenderness. Psychiatric: She has a normal mood and affect. Her behavior is normal.       JANINE and Heidy Roldan was seen today for hypertension, cholesterol problem and irregular heart beat. Diagnoses and all orders for this visit:    Essential hypertension, benign    On meds. stable. need lab work. Coronary artery disease involving native coronary artery of native heart without angina pectoris    Stable. on ASA and crestor. will see Olaf Mcfarland soon. Paroxysmal atrial fibrillation (HCC)    On coumadin and dig and metoprolol. Stable. Osteopenia of multiple sites    On calcium. Bradycardia    HR go down in 40's.might reduce metoprolol dosage to BID.adv pt to ask . Discussed expected course/resolution/complications of diagnosis in detail with patient.    Medication risks/benefits/costs/interactions/alternatives discussed with patient. Pt was given an after visit summary which includes diagnoses, current medications & vitals. Pt expressed understanding with the diagnosis and plan.

## 2017-06-09 LAB
25(OH)D3+25(OH)D2 SERPL-MCNC: 33.3 NG/ML (ref 30–100)
ALBUMIN SERPL-MCNC: 4.1 G/DL (ref 3.6–4.8)
ALBUMIN/GLOB SERPL: 1.2 {RATIO} (ref 1.2–2.2)
ALP SERPL-CCNC: 146 IU/L (ref 39–117)
ALT SERPL-CCNC: 11 IU/L (ref 0–32)
APPEARANCE UR: CLEAR
AST SERPL-CCNC: 25 IU/L (ref 0–40)
BACTERIA #/AREA URNS HPF: ABNORMAL /[HPF]
BASOPHILS # BLD AUTO: 0.1 X10E3/UL (ref 0–0.2)
BASOPHILS NFR BLD AUTO: 2 %
BILIRUB SERPL-MCNC: 1.1 MG/DL (ref 0–1.2)
BILIRUB UR QL STRIP: NEGATIVE
BUN SERPL-MCNC: 6 MG/DL (ref 8–27)
BUN/CREAT SERPL: 7 (ref 12–28)
CALCIUM SERPL-MCNC: 9.3 MG/DL (ref 8.7–10.3)
CASTS URNS MICRO: ABNORMAL
CASTS URNS QL MICRO: PRESENT /LPF
CHLORIDE SERPL-SCNC: 90 MMOL/L (ref 96–106)
CHOLEST SERPL-MCNC: 188 MG/DL (ref 100–199)
CO2 SERPL-SCNC: 23 MMOL/L (ref 18–29)
COLOR UR: ABNORMAL
CREAT SERPL-MCNC: 0.86 MG/DL (ref 0.57–1)
EOSINOPHIL # BLD AUTO: 0.2 X10E3/UL (ref 0–0.4)
EOSINOPHIL NFR BLD AUTO: 3 %
EPI CELLS #/AREA URNS HPF: ABNORMAL /HPF
ERYTHROCYTE [DISTWIDTH] IN BLOOD BY AUTOMATED COUNT: 14 % (ref 12.3–15.4)
GLOBULIN SER CALC-MCNC: 3.4 G/DL (ref 1.5–4.5)
GLUCOSE SERPL-MCNC: 70 MG/DL (ref 65–99)
GLUCOSE UR QL: NEGATIVE
HCT VFR BLD AUTO: 39.5 % (ref 34–46.6)
HDLC SERPL-MCNC: 50 MG/DL
HGB BLD-MCNC: 13.4 G/DL (ref 11.1–15.9)
HGB UR QL STRIP: NEGATIVE
IMM GRANULOCYTES # BLD: 0 X10E3/UL (ref 0–0.1)
IMM GRANULOCYTES NFR BLD: 0 %
INTERPRETATION, 910389: NORMAL
KETONES UR QL STRIP: ABNORMAL
LDLC SERPL CALC-MCNC: 124 MG/DL (ref 0–99)
LEUKOCYTE ESTERASE UR QL STRIP: ABNORMAL
LYMPHOCYTES # BLD AUTO: 2.2 X10E3/UL (ref 0.7–3.1)
LYMPHOCYTES NFR BLD AUTO: 45 %
MCH RBC QN AUTO: 30.7 PG (ref 26.6–33)
MCHC RBC AUTO-ENTMCNC: 33.9 G/DL (ref 31.5–35.7)
MCV RBC AUTO: 91 FL (ref 79–97)
MICRO URNS: ABNORMAL
MONOCYTES # BLD AUTO: 0.4 X10E3/UL (ref 0.1–0.9)
MONOCYTES NFR BLD AUTO: 7 %
MUCOUS THREADS URNS QL MICRO: PRESENT
NEUTROPHILS # BLD AUTO: 2.1 X10E3/UL (ref 1.4–7)
NEUTROPHILS NFR BLD AUTO: 43 %
NITRITE UR QL STRIP: NEGATIVE
PH UR STRIP: 5.5 [PH] (ref 5–7.5)
PLATELET # BLD AUTO: 260 X10E3/UL (ref 150–379)
POTASSIUM SERPL-SCNC: 4.6 MMOL/L (ref 3.5–5.2)
PROT SERPL-MCNC: 7.5 G/DL (ref 6–8.5)
PROT UR QL STRIP: ABNORMAL
RBC # BLD AUTO: 4.36 X10E6/UL (ref 3.77–5.28)
RBC #/AREA URNS HPF: ABNORMAL /HPF
SODIUM SERPL-SCNC: 126 MMOL/L (ref 134–144)
SP GR UR: 1.03 (ref 1–1.03)
TRIGL SERPL-MCNC: 69 MG/DL (ref 0–149)
UROBILINOGEN UR STRIP-MCNC: 1 MG/DL (ref 0.2–1)
VLDLC SERPL CALC-MCNC: 14 MG/DL (ref 5–40)
WBC # BLD AUTO: 4.8 X10E3/UL (ref 3.4–10.8)
WBC #/AREA URNS HPF: ABNORMAL /HPF

## 2017-06-19 DIAGNOSIS — R80.9 PROTEINURIA, UNSPECIFIED TYPE: Primary | ICD-10-CM

## 2017-06-19 NOTE — PROGRESS NOTES
Protein in urine with mild UTI.need to drink more fluid. repeat UA in 1month. lipid is better. all other labs are stable.

## 2017-07-19 DIAGNOSIS — R80.9 PROTEINURIA, UNSPECIFIED TYPE: ICD-10-CM

## 2017-08-10 ENCOUNTER — CLINICAL SUPPORT (OUTPATIENT)
Dept: CARDIOLOGY CLINIC | Age: 62
End: 2017-08-10

## 2017-08-10 DIAGNOSIS — I25.10 CORONARY ARTERY DISEASE INVOLVING NATIVE CORONARY ARTERY OF NATIVE HEART WITHOUT ANGINA PECTORIS: ICD-10-CM

## 2017-08-10 DIAGNOSIS — Z79.01 LONG TERM (CURRENT) USE OF ANTICOAGULANTS: Primary | ICD-10-CM

## 2017-08-10 DIAGNOSIS — E87.1 HYPONATREMIA: ICD-10-CM

## 2017-08-10 DIAGNOSIS — I10 ESSENTIAL HYPERTENSION, BENIGN: ICD-10-CM

## 2017-08-10 DIAGNOSIS — I48.0 PAROXYSMAL ATRIAL FIBRILLATION (HCC): ICD-10-CM

## 2017-08-10 LAB
INR BLD: NORMAL
INR, EXTERNAL: 1.7 (ref 2–3)
PT POC: NORMAL SECONDS
VALID INTERNAL CONTROL?: YES

## 2017-08-10 RX ORDER — WARFARIN 4 MG/1
8 TABLET ORAL
Qty: 60 TAB | Refills: 5 | Status: SHIPPED | OUTPATIENT
Start: 2017-08-10 | End: 2019-03-20 | Stop reason: ALTCHOICE

## 2017-08-10 NOTE — PROGRESS NOTES
A full discussion of the nature of anticoagulants has been carried out. A benefit risk analysis has been presented to the patient, so that they understand the justification for choosing anticoagulation at this time. The need for frequent and regular monitoring, precise dosage adjustment and compliance is stressed. Side effects of potential bleeding are discussed. The patient should avoid any OTC items containing aspirin or ibuprofen, and should avoid great swings in general diet. Avoid alcohol consumption. Call if any signs of abnormal bleeding. Next PT/INR test in 1 week. Patient states has not taken coumadin as prescribed (8 mg/daily) (2 x 4 mg tablets), due to pills low due to inability to refill. Had been instructed to have INR checked. (Had not been checked since 3/2017). Prescription refill sent to pharmacy. Confirmed receipt noted.

## 2017-09-01 ENCOUNTER — CLINICAL SUPPORT (OUTPATIENT)
Dept: CARDIOLOGY CLINIC | Age: 62
End: 2017-09-01

## 2017-09-01 DIAGNOSIS — I48.0 PAROXYSMAL ATRIAL FIBRILLATION (HCC): ICD-10-CM

## 2017-09-01 DIAGNOSIS — Z79.01 LONG TERM (CURRENT) USE OF ANTICOAGULANTS: Primary | ICD-10-CM

## 2017-09-01 LAB
INR BLD: NORMAL
INR, EXTERNAL: 2.8 (ref 2–3)
PT POC: NORMAL SECONDS
VALID INTERNAL CONTROL?: YES

## 2017-09-01 NOTE — PROGRESS NOTES
A full discussion of the nature of anticoagulants has been carried out. A benefit risk analysis has been presented to the patient, so that they understand the justification for choosing anticoagulation at this time. The need for frequent and regular monitoring, precise dosage adjustment and compliance is stressed. Side effects of potential bleeding are discussed. The patient should avoid any OTC items containing aspirin or ibuprofen, and should avoid great swings in general diet. Avoid alcohol consumption. Call if any signs of abnormal bleeding. Next PT/INR test in 1 month. No change in dosing regimen. Informed patient of need for follow up visit with Dr. Jeff Mcnulty. She will call office to schedule.

## 2017-09-06 RX ORDER — ROSUVASTATIN CALCIUM 40 MG/1
40 TABLET, COATED ORAL
Qty: 30 TAB | Refills: 6 | Status: SHIPPED | OUTPATIENT
Start: 2017-09-06 | End: 2017-09-12

## 2017-09-06 NOTE — TELEPHONE ENCOUNTER
Patient states that pharmacy has sent request(s) to us for generic of Crestor. She needs us to take care of this, phone for pharmacy [phone].   Insurance company phone [phone].    30 day supply with refills     Pharmacy verified     Thank you, Maurice Tucker

## 2017-09-11 ENCOUNTER — TELEPHONE (OUTPATIENT)
Dept: CARDIOLOGY CLINIC | Age: 62
End: 2017-09-11

## 2017-09-11 DIAGNOSIS — E78.00 PURE HYPERCHOLESTEROLEMIA: ICD-10-CM

## 2017-09-11 DIAGNOSIS — I10 ESSENTIAL HYPERTENSION, BENIGN: Primary | Chronic | ICD-10-CM

## 2017-09-11 NOTE — TELEPHONE ENCOUNTER
Patient would like a return call regarding medications that are covered under her insurance,Atorvastatin or pravastatin. Please call her to discuss.  She can be reached at  [phone]

## 2017-09-11 NOTE — TELEPHONE ENCOUNTER
Insurance will no longer cover generic crestor since patient has not tried noth atorvastatin and pravastatin. I tired to appeal it, but they won't approve it. Can she try one of the others?

## 2017-09-12 RX ORDER — ATORVASTATIN CALCIUM 40 MG/1
40 TABLET, FILM COATED ORAL DAILY
Qty: 30 TAB | Refills: 5 | Status: SHIPPED | OUTPATIENT
Start: 2017-09-12 | End: 2021-09-14 | Stop reason: ALTCHOICE

## 2017-09-12 NOTE — TELEPHONE ENCOUNTER
Jeferson Jay MD   You 1 hour ago (7:24 AM)                 Atorvastatin 40. thanks (Routing comment)         Called patient. Advised patient of Dr. Riya Hester advice. Verified patient's pharmacy. rx sent electronically. Patient verbalizes understanding and denies further questions or concerns. Requested Prescriptions     Signed Prescriptions Disp Refills    atorvastatin (LIPITOR) 40 mg tablet 30 Tab 5     Sig: Take 1 Tab by mouth daily.      Authorizing Provider: Kirill Grewal     Ordering User: Shital Maya    Per Dr. Riya Hester verbal orders

## 2017-09-25 DIAGNOSIS — I10 ESSENTIAL HYPERTENSION, BENIGN: ICD-10-CM

## 2017-10-03 ENCOUNTER — OFFICE VISIT (OUTPATIENT)
Dept: INTERNAL MEDICINE CLINIC | Age: 62
End: 2017-10-03

## 2017-10-03 VITALS
HEART RATE: 57 BPM | SYSTOLIC BLOOD PRESSURE: 138 MMHG | RESPIRATION RATE: 18 BRPM | WEIGHT: 104 LBS | BODY MASS INDEX: 22.44 KG/M2 | TEMPERATURE: 97.6 F | HEIGHT: 57 IN | DIASTOLIC BLOOD PRESSURE: 82 MMHG | OXYGEN SATURATION: 97 %

## 2017-10-03 DIAGNOSIS — J06.9 URTI (ACUTE UPPER RESPIRATORY INFECTION): ICD-10-CM

## 2017-10-03 DIAGNOSIS — R06.2 WHEEZING: Primary | ICD-10-CM

## 2017-10-03 RX ORDER — ALBUTEROL SULFATE 90 UG/1
1 AEROSOL, METERED RESPIRATORY (INHALATION)
Qty: 1 INHALER | Refills: 0 | Status: SHIPPED | OUTPATIENT
Start: 2017-10-03 | End: 2017-10-13 | Stop reason: ALTCHOICE

## 2017-10-03 RX ORDER — CLINDAMYCIN HYDROCHLORIDE 300 MG/1
300 CAPSULE ORAL 3 TIMES DAILY
Qty: 21 CAP | Refills: 0 | Status: SHIPPED | OUTPATIENT
Start: 2017-10-03 | End: 2017-10-09 | Stop reason: ALTCHOICE

## 2017-10-03 NOTE — MR AVS SNAPSHOT
Visit Information Date & Time Provider Department Dept. Phone Encounter #  
 10/3/2017  2:20 PM Daquan Saleh, [address] [facility] Medicine [phone] [id number] Your Appointments 10/9/2017 11:15 AM  
ROUTINE CARE with Rufus Esqueda MD  
[facility] Internal Medicine [facility]) Appt Note: 4 month follow up [address] [facility] [address]  
[phone]  
  
   
 [address] Upcoming Health Maintenance Date Due ZOSTER VACCINE AGE 60> 10/19/2013 PAP AKA CERVICAL CYTOLOGY 4/17/2016 COLONOSCOPY 12/27/2016 INFLUENZA AGE 9 TO ADULT 8/1/2017 BREAST CANCER SCRN MAMMOGRAM 11/17/2017 DTaP/Tdap/Td series (2 - Td) 3/24/2025 Allergies as of 10/3/2017  Review Complete On: 10/3/2017 By: Daquan Saleh NP Severity Noted Reaction Type Reactions Amoxicillin Low 10/08/2013   Side Effect Diarrhea  
 1 day after Doxycycline Doxycycline Low 09/21/2013   Side Effect Diarrhea  
 100mg single dose \"too strong\" and caused diarrhea Current Immunizations  Reviewed on 6/8/2017 Name Date Influenza Vaccine 11/1/2015, 9/20/2013 Influenza Vaccine (Quad) PF 10/5/2016 Influenza Vaccine Split 9/1/2012 Influenza Vaccine Whole 9/1/2010 Tdap 3/24/2015 ZZZ-RETIRED (DO NOT USE) Pneumococcal Vaccine (Unspecified Type) 1/1/2010 Not reviewed this visit You Were Diagnosed With   
  
 Codes Comments Wheezing    -  Primary ICD-10-CM: R06.2 ICD-9-CM: 786.07   
 URTI (acute upper respiratory infection)     ICD-10-CM: J06.9 ICD-9-CM: 465.9 Vitals BP Pulse Temp Resp Height(growth percentile) Weight(growth percentile) 138/82 (BP 1 Location: Right arm, BP Patient Position: Sitting) (!) 57 97.6 °F (36.4 °C) (Oral) 18 4' 8.5\" (1.435 m) 104 lb (47.2 kg) SpO2 BMI OB Status Smoking Status 97% 22.91 kg/m2 Hysterectomy Former Smoker Vitals History BMI and BSA Data Body Mass Index Body Surface Area  
 22.91 kg/m 2 1.37 m 2 Preferred Pharmacy Pharmacy Name Phone [address] , [address] [facility] [phone] Your Updated Medication List  
  
   
This list is accurate as of: 10/3/17  3:20 PM.  Always use your most recent med list.  
  
  
  
  
 albuterol 90 mcg/actuation inhaler Commonly known as:  PROVENTIL HFA, VENTOLIN HFA, PROAIR HFA Take 1 Puff by inhalation every four (4) hours as needed for Wheezing. amLODIPine 5 mg tablet Commonly known as:  Voncile Margaret TAKE ONE TABLET BY MOUTH DAILY FOR BLOOD PRESSURE  
  
 aspirin 81 mg tablet Take 81 mg by mouth daily. atorvastatin 40 mg tablet Commonly known as:  LIPITOR Take 1 Tab by mouth daily. calcium citrate-vitamin d3 315-200 mg-unit Tab Commonly known as:  CITRACAL+D Take 1 Tab by mouth daily (with breakfast). clindamycin 300 mg capsule Commonly known as:  CLEOCIN Take 1 Cap by mouth three (3) times daily. digoxin 0.125 mg tablet Commonly known as:  LANOXIN Take 1 Tab by mouth daily. jjoyyur-ddnqmwtmu-lrdwqmgvgvrt -325 mg capsule Commonly known as:  Vineet Kansky Take 1 Cap by mouth.  
  
 lansoprazole 30 mg capsule Commonly known as:  PREVACID Take 1 Cap by mouth Daily (before breakfast). Indications: HEARTBURN  
  
 lisinopril 40 mg tablet Commonly known as:  Kurt Bridges Take 1 Tab by mouth daily. metoprolol tartrate 50 mg tablet Commonly known as:  LOPRESSOR Take 1 Tab by mouth three (3) times daily. warfarin 4 mg tablet Commonly known as:  COUMADIN Take 2 Tabs by mouth daily (with dinner). Or as directed by coumadin clinic.  
  
 zolpidem 5 mg tablet Commonly known as:  AMBIEN Take 5 mg by mouth nightly as needed for Sleep. Prescriptions Sent to Pharmacy Refills albuterol (PROVENTIL HFA, VENTOLIN HFA, PROAIR HFA) 90 mcg/actuation inhaler 0 Sig: Take 1 Puff by inhalation every four (4) hours as needed for Wheezing. Class: Normal  
 [facility] [id number], [address] Ph #: [phone] Route: Inhalation  
 clindamycin (CLEOCIN) 300 mg capsule 0 Sig: Take 1 Cap by mouth three (3) times daily. Class: Normal  
 Pharmacy: [facility] [id number], [address] Ph #: [phone] Route: Oral  
  
Introducing \[facility] [place] [facility]\"" & HEALTH SERVICES! Brinda Bear introduces betaworks patient portal. Now you can access parts of your medical record, email your doctor's office, and request medication refills online. 1. In your internet browser, go to [email or web address] 2. Click on the First Time User? Click Here link in the Sign In box. You will see the New Member Sign Up page. 3. Enter your betaworks Access Code exactly as it appears below. You will not need to use this code after youve completed the sign-up process. If you do not sign up before the expiration date, you must request a new code. · betaworks Access Code: [email or web address] Expires: 1/1/2018  3:20 PM 
 
4. Enter the last four digits of your Social Security Number (xxxx) and Date of Birth (mm/dd/yyyy) as indicated and click Submit. You will be taken to the next sign-up page. 5. Create a Fund Recst ID. This will be your betaworks login ID and cannot be changed, so think of one that is secure and easy to remember. 6. Create a betaworks password. You can change your password at any time. 7. Enter your Password Reset Question and Answer. This can be used at a later time if you forget your password. 8. Enter your e-mail address. You will receive e-mail notification when new information is available in [address] 9. Click Sign Up. You can now view and download portions of your medical record. 10. Click the Download Summary menu link to download a portable copy of your medical information. If you have questions, please visit the Frequently Asked Questions section of the MarketArt website. Remember, MarketArt is NOT to be used for urgent needs. For medical emergencies, dial 911. Now available from your iPhone and Android! Please provide this summary of care documentation to your next provider. Your primary care clinician is listed as John Basilio. If you have any questions after today's visit, please call [phone].

## 2017-10-03 NOTE — PROGRESS NOTES
Subjective:     Chief Complaint   Patient presents with    Cough     yellow sputum    Wheezing       History of Present Illness    Cuca Burr is a 59y.o. year old female who is a patient of Dr. Andrea Frausto that presents today with complaints of cough and congestion that began last Tuesday. She reports taking Robitussin and Mucinex with no relief. She denies any CP or fevers. Increased work of breathing. NAD. Wheezing to the left side. She denies any chronic respiratory conditions. She does have a hx of pneumonia. She denies any other complaints at this time. Reviewed medications, recent lab work and imaging with patient. Pt reports compliance with medications. Current Outpatient Prescriptions on File Prior to Visit   Medication Sig Dispense Refill    atorvastatin (LIPITOR) 40 mg tablet Take 1 Tab by mouth daily. 30 Tab 5    warfarin (COUMADIN) 4 mg tablet Take 2 Tabs by mouth daily (with dinner). Or as directed by coumadin clinic. 60 Tab 5    lisinopril (PRINIVIL, ZESTRIL) 40 mg tablet Take 1 Tab by mouth daily. 90 Tab 3    calcium citrate-vitamin d3 (CITRACAL+D) 315-200 mg-unit tab Take 1 Tab by mouth daily (with breakfast). 30 Tab 12    sftyggc-yrzpevhxt-imnogsxkgcqj (MIDRIN) -325 mg capsule Take 1 Cap by mouth.  digoxin (LANOXIN) 0.125 mg tablet Take 1 Tab by mouth daily. 90 Tab 3    lansoprazole (PREVACID) 30 mg capsule Take 1 Cap by mouth Daily (before breakfast). Indications: HEARTBURN 30 Cap 5    metoprolol tartrate (LOPRESSOR) 50 mg tablet Take 1 Tab by mouth three (3) times daily. 90 Tab 6    amLODIPine (NORVASC) 5 mg tablet TAKE ONE TABLET BY MOUTH DAILY FOR BLOOD PRESSURE 30 Tab 6    zolpidem (AMBIEN) 5 mg tablet Take 5 mg by mouth nightly as needed for Sleep.  aspirin 81 mg tablet Take 81 mg by mouth daily. No current facility-administered medications on file prior to visit.         Allergies   Allergen Reactions    Amoxicillin Diarrhea     1 day after Doxycycline    Doxycycline Diarrhea     100mg single dose \"too strong\" and caused diarrhea      Past Medical History:   Diagnosis Date    Alcohol abuse     2/25/16 phone note    Anasarca 10/18/13    per CT. Dr. Octavio Flores.  Atrial myxoma 06/06/14    Dr. Everton Anthony.  CAD (coronary artery disease)     Diastolic heart failure ([facility] [address] 10/15/13    Dr. Damaris Schilling.  Diverticulosis 12/27/11    Dr. Ramila Parker.  DJD (degenerative joint disease) of knee     Dr. Anna Ibrahim Elevated cholesterol     Elevated liver enzymes 09/2013    AST, ALT, Alk P    Heartburn     Hepatic steatosis 10/18/13    Hypertension     Low sodium 06/06/14    admitted to [facility]    Migraine     MVP (mitral valve prolapse)     w/MR  Dr. Scott Young. Dr. Rickie Edouadr. Dr. Everton Anthony.  Osteopenia 03/04/08    Ovarian cyst, bilateral     PAF (paroxysmal atrial fibrillation) ([facility] [address] 03/21/11    New. Dr. Holden Matias. Dr. Rickie Edouard. Dr. Noemy Garcia Severe mitral regurgitation by prior echocardiogram 03/21/11    Dr. Leanne Dowling. Dr. Noemy Garcia Uterine fibroid       Past Surgical History:   Procedure Laterality Date    CARDIAC SURG PROCEDURE UNLIST  5/23/2011    CABG x 1 vessel, MV REPAIR. Dr. Everton Anthony.  COLONOSCOPY  2006, 12/27/11    Dr. Ramila Parker.   due q 5-10 yrs    HX BUNIONECTOMY  2000, 1990's    Rt 2000, Lt 1990s    HX CYST INCISION AND DRAINAGE      HX HEART CATHETERIZATION  04/12/11    Dr. Rickie Edouard    HX HYSTEROSCOPY WITH ENDOMETRIAL ABLATION  11/2004    HX KNEE ARTHROSCOPY Left     HX 1 [facility] L, 2001 R    HX MYOMECTOMY  11/2004    HX OVARIAN CYST REMOVAL Right 2004    HX WOLFGANG AND BSO  2005    HX TUBAL LIGATION        Social History   Substance Use Topics    Smoking status: Former Smoker     Packs/day: 0.25     Years: 23.00     Types: Cigarettes     Quit date: 1/7/2016    Smokeless tobacco: Never Used      Comment: very rarely cigarettes--no cigarettes X 2 weeks    Alcohol use No Comment: as of 02/21/16      Family History   Problem Relation Age of Onset    Heart Surgery Mother      CABG    Hypertension Mother     High Cholesterol Mother     Cancer Father      bone    Diabetes Sister     High Cholesterol Brother     Cancer Maternal Grandmother         Objective:     Vitals:    10/03/17 1446 10/03/17 1501   BP: 146/81 138/82   Pulse: 64 (!) 57   Resp: 18    Temp: 97.6 °F (36.4 °C)    TempSrc: Oral    SpO2: 97%    Weight: 104 lb (47.2 kg)    Height: 4' 8.5\" (1.435 m)        Review of Systems   Constitutional: Negative. HENT: Negative. Eyes: Negative. Respiratory: Positive for cough. Cardiovascular: Negative. Gastrointestinal: Negative. Genitourinary: Negative. Musculoskeletal: Negative. Skin: Negative. Neurological: Negative. Psychiatric/Behavioral: Negative. Physical Exam   Constitutional: She is oriented to person, place, and time. She appears well-developed and well-nourished. No distress. HENT:   Head: Normocephalic and atraumatic. Eyes: EOM are normal. Pupils are equal, round, and reactive to light. Neck: Normal range of motion. Neck supple. Cardiovascular: Normal rate, regular rhythm and normal heart sounds. Pulmonary/Chest: No respiratory distress. She has wheezes. She has rhonchi in the left upper field and the left middle field. Increased work of breathing. Abdominal: Soft. Bowel sounds are normal. She exhibits no distension. There is no tenderness. Musculoskeletal: Normal range of motion. She exhibits no edema, tenderness or deformity. Neurological: She is alert and oriented to person, place, and time. Skin: Skin is warm and dry. She is not diaphoretic. Psychiatric: She has a normal mood and affect. Her behavior is normal.   Nursing note and vitals reviewed. Assessment/ Plan:   Diagnoses and all orders for this visit:    1.  Wheezing   Will order   -     albuterol (PROVENTIL HFA, VENTOLIN HFA, PROAIR HFA) 90 mcg/actuation inhaler; Take 1 Puff by inhalation every four (4) hours as needed for Wheezing. 2. URTI (acute upper respiratory infection)   Will order   -     clindamycin (CLEOCIN) 300 mg capsule; Take 1 Cap by mouth three (3) times daily. Patient's plan of care has been reviewed with them. Patient and/or family have verbally conveyed their understanding and agreement of the patient's signs, symptoms, diagnosis, treatment and prognosis and additionally agree to follow up as recommended or return to [facility] should their condition change prior to follow-up. Discharge instructions have also been provided to the patient with some educational information regarding their diagnosis as well a list of reasons why they would want to return to the office prior to their follow-up appointment should their condition change. Follow-up with Dr. Carlie Wiley as scheduled.

## 2017-10-03 NOTE — PROGRESS NOTES
Health Maintenance Due   Topic Date Due    ZOSTER VACCINE AGE 60>  10/19/2013    PAP AKA CERVICAL CYTOLOGY  04/17/2016    COLONOSCOPY  12/27/2016    INFLUENZA AGE 9 TO ADULT  08/01/2017    BREAST CANCER SCRN MAMMOGRAM  11/17/2017       Chief Complaint   Patient presents with    Cough     yellow sputum    Wheezing       1. Have you been to the ER, urgent care clinic since your last visit? Hospitalized since your last visit? No    2. Have you seen or consulted any other health care providers outside of the [facility] since your last visit? Include any pap smears or colon screening. No    3) Do you have an Advance Directive on file? no    4) Are you interested in receiving information on Advance Directives? NO      Patient is accompanied by self I have received verbal consent from Chintan Pinedo to discuss any/all medical information while they are present in the room. Patient states she has not taken her medication this am  /81.

## 2017-10-03 NOTE — PATIENT INSTRUCTIONS
Wheezing or Bronchoconstriction: Care Instructions  Your Care Instructions  Wheezing is a whistling noise made during breathing. It occurs when the small airways, or bronchial tubes, that lead to your lungs swell or contract (spasm) and become narrow. This narrowing is called bronchoconstriction. When your airways constrict, it is hard for air to pass through and this makes it hard for you to breathe. Wheezing and bronchoconstriction can be caused by many problems, including:  · An infection such as the flu or a cold. · Allergies such as hay fever. · Diseases such as asthma or chronic obstructive pulmonary disease. · Smoking. Treatment for your wheezing depends on what is causing the problem. Your wheezing may get better without treatment. But you may need to pay attention to things that cause your wheezing and avoid them. Or you may need medicine to help treat the wheezing and to reduce the swelling or to relieve spasms in your lungs. Follow-up care is a key part of your treatment and safety. Be sure to make and go to all appointments, and call your doctor if you are having problems. It is also a good idea to know your test results and keep a list of the medicines you take. How can you care for yourself at home? · Take your medicine exactly as prescribed. Call your doctor if you think you are having a problem with your medicine. You will get more details on the specific medicine your doctor prescribes. · If your doctor prescribed antibiotics, take them as directed. Do not stop taking them just because you feel better. You need to take the full course of antibiotics. · Breathe moist air from a humidifier, hot shower, or sink filled with hot water. This may help ease your symptoms and make it easier for you to breathe. · If you have congestion in your nose and throat, drinking plenty of fluids, especially hot fluids, may help relieve your symptoms.  If you have kidney, heart, or liver disease and have to limit fluids, talk with your doctor before you increase the amount of fluids you drink. · If you have mucus in your airways, it may help to breathe deeply and cough. · Do not smoke or allow others to smoke around you. Smoking can make your wheezing worse. If you need help quitting, talk to your doctor about stop-smoking programs and medicines. These can increase your chances of quitting for good. · Avoid things that may cause your wheezing. These may include colds, smoke, air pollution, dust, pollen, pets, cockroaches, stress, and cold air. When should you call for help? Call 911 anytime you think you may need emergency care. For example, call if:  · You have severe trouble breathing. · You passed out (lost consciousness). Call your doctor now or seek immediate medical care if:  · You cough up yellow, dark brown, or bloody mucus (sputum). · You have new or worse shortness of breath. · Your wheezing is not getting better or it gets worse after you start taking your medicine. Watch closely for changes in your health, and be sure to contact your doctor if:  · You do not get better as expected. Where can you learn more? Go to http://payam-laurence.info/. Enter 454 1301 in the search box to learn more about \"Wheezing or Bronchoconstriction: Care Instructions. \"  Current as of: March 25, 2017  Content Version: 11.3  © 6525-6237 [employer]. Care instructions adapted under license by [employer] (which disclaims liability or warranty for this information). If you have questions about a medical condition or this instruction, always ask your healthcare professional. [place] [address] any warranty or liability for your use of this information. Bronchitis: Care Instructions  Your Care Instructions    Bronchitis is inflammation of the bronchial tubes, which carry air to the lungs. The tubes swell and produce mucus, or phlegm.  The mucus and inflamed bronchial tubes make you cough. You may have trouble breathing. Most cases of bronchitis are caused by viruses like those that cause colds. Antibiotics usually do not help and they may be harmful. Bronchitis usually develops rapidly and lasts about 2 to 3 weeks in otherwise healthy people. Follow-up care is a key part of your treatment and safety. Be sure to make and go to all appointments, and call your doctor if you are having problems. It's also a good idea to know your test results and keep a list of the medicines you take. How can you care for yourself at home? · Take all medicines exactly as prescribed. Call your doctor if you think you are having a problem with your medicine. · Get some extra rest.  · Take an over-the-counter pain medicine, such as acetaminophen (Tylenol), ibuprofen (Advil, Motrin), or naproxen (Aleve) to reduce fever and relieve body aches. Read and follow all instructions on the label. · Do not take two or more pain medicines at the same time unless the doctor told you to. Many pain medicines have acetaminophen, which is Tylenol. Too much acetaminophen (Tylenol) can be harmful. · Take an over-the-counter cough medicine that contains dextromethorphan to help quiet a dry, hacking cough so that you can sleep. Avoid cough medicines that have more than one active ingredient. Read and follow all instructions on the label. · Breathe moist air from a humidifier, hot shower, or sink filled with hot water. The heat and moisture will thin mucus so you can cough it out. · Do not smoke. Smoking can make bronchitis worse. If you need help quitting, talk to your doctor about stop-smoking programs and medicines. These can increase your chances of quitting for good. When should you call for help? Call 911 anytime you think you may need emergency care. For example, call if:  · You have severe trouble breathing.   Call your doctor now or seek immediate medical care if:  · You have new or worse trouble breathing. · You cough up dark brown or bloody mucus (sputum). · You have a new or higher fever. · You have a new rash. Watch closely for changes in your health, and be sure to contact your doctor if:  · You cough more deeply or more often, especially if you notice more mucus or a change in the color of your mucus. · You are not getting better as expected. Where can you learn more? Go to http://payam-laurence.info/. Enter H333 in the search box to learn more about \"Bronchitis: Care Instructions. \"  Current as of: March 25, 2017  Content Version: 11.3  © 4690-8861 "[employer]. Care instructions adapted under license by [employer] (which disclaims liability or warranty for this information). If you have questions about a medical condition or this instruction, always ask your healthcare professional. [address] any warranty or liability for your use of this information.

## 2017-10-05 RX ORDER — AMLODIPINE BESYLATE 5 MG/1
TABLET ORAL
Qty: 30 TAB | Refills: 5 | Status: SHIPPED | OUTPATIENT
Start: 2017-10-05 | End: 2018-06-13 | Stop reason: SDUPTHER

## 2017-10-05 NOTE — TELEPHONE ENCOUNTER
Requested Prescriptions     Signed Prescriptions Disp Refills    amLODIPine (NORVASC) 5 mg tablet 30 Tab 5     Sig: TAKE ONE TABLET BY MOUTH DAILY FOR BLOOD PRESSURE     Authorizing Provider: Payton Clemons     Ordering User: Tristan Client    Per Dr. Amish Walker verbal orders

## 2017-10-05 NOTE — TELEPHONE ENCOUNTER
Requested Prescriptions     Pending Prescriptions Disp Refills    amLODIPine (NORVASC) 5 mg tablet [Pharmacy Med Name: amLODIPine BESYLATE 5 MG TAB] 30 Tab 5     Sig: TAKE ONE TABLET BY MOUTH DAILY FOR BLOOD PRESSURE     Last OV 9/1/17  Next OV not scheduled  Pt is out of this medication    Pharmacy verified    Thank you, AP

## 2017-10-09 ENCOUNTER — TELEPHONE (OUTPATIENT)
Dept: CARDIOLOGY CLINIC | Age: 62
End: 2017-10-09

## 2017-10-09 ENCOUNTER — OFFICE VISIT (OUTPATIENT)
Dept: INTERNAL MEDICINE CLINIC | Age: 62
End: 2017-10-09

## 2017-10-09 VITALS
HEIGHT: 57 IN | OXYGEN SATURATION: 99 % | DIASTOLIC BLOOD PRESSURE: 56 MMHG | HEART RATE: 42 BPM | SYSTOLIC BLOOD PRESSURE: 131 MMHG | RESPIRATION RATE: 20 BRPM | TEMPERATURE: 95.9 F | BODY MASS INDEX: 22.01 KG/M2 | WEIGHT: 102 LBS

## 2017-10-09 DIAGNOSIS — I10 ESSENTIAL HYPERTENSION, BENIGN: Chronic | ICD-10-CM

## 2017-10-09 DIAGNOSIS — I48.0 PAROXYSMAL ATRIAL FIBRILLATION (HCC): ICD-10-CM

## 2017-10-09 DIAGNOSIS — R00.1 BRADYCARDIA: Primary | ICD-10-CM

## 2017-10-09 DIAGNOSIS — I25.10 CORONARY ARTERY DISEASE INVOLVING NATIVE CORONARY ARTERY OF NATIVE HEART WITHOUT ANGINA PECTORIS: ICD-10-CM

## 2017-10-09 DIAGNOSIS — Z23 ENCOUNTER FOR IMMUNIZATION: ICD-10-CM

## 2017-10-09 RX ORDER — METOPROLOL TARTRATE 50 MG/1
25 TABLET ORAL 3 TIMES DAILY
Qty: 90 TAB | Refills: 6
Start: 2017-10-09 | End: 2017-10-13 | Stop reason: SDUPTHER

## 2017-10-09 NOTE — PATIENT INSTRUCTIONS
Vaccine Information Statement    Influenza (Flu) Vaccine (Inactivated or Recombinant): What you need to know    Many Vaccine Information Statements are available in Croatian and other languages. See www.immunize.org/vis  Hojas de Información Sobre Vacunas están disponibles en Español y en muchos otros idiomas. Visite www.immunize.org/vis    1. Why get vaccinated? Influenza (flu) is a contagious disease that spreads around the [place] every year, usually between October and May. Flu is caused by influenza viruses, and is spread mainly by coughing, sneezing, and close contact. Anyone can get flu. Flu strikes suddenly and can last several days. Symptoms vary by age, but can include:   fever/chills   sore throat   muscle aches   fatigue   cough   headache    runny or stuffy nose    Flu can also lead to pneumonia and blood infections, and cause diarrhea and seizures in children. If you have a medical condition, such as heart or lung disease, flu can make it worse. Flu is more dangerous for some people. Infants and young children, people 72years of age and older, pregnant women, and people with certain health conditions or a weakened immune system are at greatest risk. Each year thousands of people in the [facility] die from flu, and many more are hospitalized. Flu vaccine can:   keep you from getting flu,   make flu less severe if you do get it, and   keep you from spreading flu to your family and other people. 2. Inactivated and recombinant flu vaccines    A dose of flu vaccine is recommended every flu season. Children 6 months through 6years of age may need two doses during the same flu season. Everyone else needs only one dose each flu season.        Some inactivated flu vaccines contain a very small amount of a mercury-based preservative called thimerosal. Studies have not shown thimerosal in vaccines to be harmful, but flu vaccines that do not contain thimerosal are available. There is no live flu virus in flu shots. They cannot cause the flu. There are many flu viruses, and they are always changing. Each year a new flu vaccine is made to protect against three or four viruses that are likely to cause disease in the upcoming flu season. But even when the vaccine doesnt exactly match these viruses, it may still provide some protection    Flu vaccine cannot prevent:   flu that is caused by a virus not covered by the vaccine, or   illnesses that look like flu but are not. It takes about 2 weeks for protection to develop after vaccination, and protection lasts through the flu season. 3. Some people should not get this vaccine    Tell the person who is giving you the vaccine:     If you have any severe, life-threatening allergies. If you ever had a life-threatening allergic reaction after a dose of flu vaccine, or have a severe allergy to any part of this vaccine, you may be advised not to get vaccinated. Most, but not all, types of flu vaccine contain a small amount of egg protein.  If you ever had Guillain-Barré Syndrome (also called GBS). Some people with a history of GBS should not get this vaccine. This should be discussed with your doctor.  If you are not feeling well. It is usually okay to get flu vaccine when you have a mild illness, but you might be asked to come back when you feel better. 4. Risks of a vaccine reaction    With any medicine, including vaccines, there is a chance of reactions. These are usually mild and go away on their own, but serious reactions are also possible. Most people who get a flu shot do not have any problems with it.      Minor problems following a flu shot include:    soreness, redness, or swelling where the shot was given     hoarseness   sore, red or itchy eyes   cough   fever   aches   headache   itching   fatigue  If these problems occur, they usually begin soon after the shot and last 1 or 2 days. More serious problems following a flu shot can include the following:     There may be a small increased risk of Guillain-Barré Syndrome (GBS) after inactivated flu vaccine. This risk has been estimated at 1 or 2 additional cases per million people vaccinated. This is much lower than the risk of severe complications from flu, which can be prevented by flu vaccine.  Young children who get the flu shot along with pneumococcal vaccine (PCV13) and/or DTaP vaccine at the same time might be slightly more likely to have a seizure caused by fever. Ask your doctor for more information. Tell your doctor if a child who is getting flu vaccine has ever had a seizure. Problems that could happen after any injected vaccine:      People sometimes faint after a medical procedure, including vaccination. Sitting or lying down for about 15 minutes can help prevent fainting, and injuries caused by a fall. Tell your doctor if you feel dizzy, or have vision changes or ringing in the ears.  Some people get severe pain in the shoulder and have difficulty moving the arm where a shot was given. This happens very rarely.  Any medication can cause a severe allergic reaction. Such reactions from a vaccine are very rare, estimated at about 1 in a million doses, and would happen within a few minutes to a few hours after the vaccination. As with any medicine, there is a very remote chance of a vaccine causing a serious injury or death. The safety of vaccines is always being monitored. For more information, visit: www.cdc.gov/vaccinesafety/    5. What if there is a serious reaction? What should I look for?  Look for anything that concerns you, such as signs of a severe allergic reaction, very high fever, or unusual behavior.     Signs of a severe allergic reaction can include hives, swelling of the face and throat, difficulty breathing, a fast heartbeat, dizziness, and weakness  usually within a few minutes to a few hours after the vaccination. What should I do?  If you think it is a severe allergic reaction or other emergency that cant wait, call 9-1-1 and get the person to the nearest hospital. Otherwise, call your doctor.  Reactions should be reported to the Vaccine Adverse Event Reporting System (VAERS). Your doctor should file this report, or you can do it yourself through  the VAERS web site at www.vaers. [facility].gov, or by calling 9-394.871.4377. VAERS does not give medical advice. 6. The National Vaccine Injury Compensation Program    The [facility] Vaccine Injury Compensation Program (VICP) is a federal program that was created to compensate people who may have been injured by certain vaccines. Persons who believe they may have been injured by a vaccine can learn about the program and about filing a claim by calling 1-819.778.6417 or visiting the [employer] website at www.[facility].gov/vaccinecompensation. There is a time limit to file a claim for compensation. 7. How can I learn more?  Ask your healthcare provider. He or she can give you the vaccine package insert or suggest other sources of information.  Call your local or state health department.  Contact the Centers for Disease Control and Prevention (CDC):  - Call 2-667.956.4208 (1-800-CDC-INFO) or  - Visit CDCs website at www.cdc.gov/flu    Vaccine Information Statement   Inactivated Influenza Vaccine   8/7/2015  42 MARU Webber 302FT-79    Department of Health and Human Services  Centers for Disease Control and Prevention    Office Use Only

## 2017-10-09 NOTE — LETTER
10/24/2017 7:56 AM 
 
Ms. Bony Galeano [address] [address] Dear Bony Galeano: 
 
Please find your most recent results below. Resulted Orders METABOLIC PANEL, COMPREHENSIVE Result Value Ref Range Glucose 70 65 - 99 mg/dL BUN 4 (L) 8 - 27 mg/dL Creatinine 0.66 0.57 - 1.00 mg/dL GFR est non-AA 94 >59 mL/min/1.73 GFR est  >59 mL/min/1.73  
 BUN/Creatinine ratio 6 (L) 12 - 28 Sodium 128 (L) 134 - 144 mmol/L Potassium 4.8 3.5 - 5.2 mmol/L Chloride 92 (L) 96 - 106 mmol/L  
 CO2 24 18 - 29 mmol/L Calcium 9.1 8.7 - 10.3 mg/dL Protein, total 7.3 6.0 - 8.5 g/dL Albumin 3.9 3.6 - 4.8 g/dL GLOBULIN, TOTAL 3.4 1.5 - 4.5 g/dL A-G Ratio 1.1 (L) 1.2 - 2.2 Bilirubin, total 1.1 0.0 - 1.2 mg/dL Alk. phosphatase 163 (H) 39 - 117 IU/L  
 AST (SGOT) 27 0 - 40 IU/L  
 ALT (SGPT) 11 0 - 32 IU/L Narrative Performed at:  [address]  [id number] : Leonel Bruce MD, Phone:  [phone] RECOMMENDATIONS: 
 
Stable labs. Please call me if you have any questions: [phone] Sincerely, Gume Andrade MD

## 2017-10-09 NOTE — LETTER
10/24/2017 7:56 AM 
 
Ms. Aria Perez [address] [facility] [place] [address] Dear Aria Perez: 
 
Please find your most recent results below. Resulted Orders METABOLIC PANEL, COMPREHENSIVE Result Value Ref Range Glucose 70 65 - 99 mg/dL BUN 4 (L) 8 - 27 mg/dL Creatinine 0.66 0.57 - 1.00 mg/dL GFR est non-AA 94 >59 mL/min/1.73 GFR est  >59 mL/min/1.73  
 BUN/Creatinine ratio 6 (L) 12 - 28 Sodium 128 (L) 134 - 144 mmol/L Potassium 4.8 3.5 - 5.2 mmol/L Chloride 92 (L) 96 - 106 mmol/L  
 CO2 24 18 - 29 mmol/L Calcium 9.1 8.7 - 10.3 mg/dL Protein, total 7.3 6.0 - 8.5 g/dL Albumin 3.9 3.6 - 4.8 g/dL GLOBULIN, TOTAL 3.4 1.5 - 4.5 g/dL A-G Ratio 1.1 (L) 1.2 - 2.2 Bilirubin, total 1.1 0.0 - 1.2 mg/dL Alk. phosphatase 163 (H) 39 - 117 IU/L  
 AST (SGOT) 27 0 - 40 IU/L  
 ALT (SGPT) 11 0 - 32 IU/L Narrative Performed at:  [address]  [id number] : Darletta Dancer MD, Phone:  [phone] RECOMMENDATIONS: 
 
Stable labs. Please call me if you have any questions: [phone] Sincerely, Mecca Casper MD

## 2017-10-09 NOTE — PROGRESS NOTES
HISTORY OF PRESENT ILLNESS  Gurdeep Burton is a 59 y.o. female here to follow up. She has very low HR,not dizzy,no LOC. She has CAD,S/P CABG,A fib and atrial mixoma. Has A fib,on med.scoumadin is managed by cardiologist.  On crestor. need lab work. Reports compliance with medications and diet. Med list and most recent labs/studies reviewed with pt. active physically to control weight. Needs med refills. Reports no other new c/o. Need flu shot. Hypertension      Cholesterol Problem     Immunization/Injection         Review of Systems   Constitutional: Negative. HENT: Negative. Eyes: Negative. Respiratory: Negative. Cardiovascular: Negative. Gastrointestinal: Negative. Genitourinary: Negative. Musculoskeletal: Positive for joint pain. Skin: Negative. Neurological: Negative. Psychiatric/Behavioral: Negative. Physical Exam   Constitutional: She is oriented to person, place, and time. She appears well-developed and well-nourished. No distress. HENT:   Head: Normocephalic and atraumatic. Right Ear: External ear normal.   Left Ear: External ear normal.   Nose: Nose normal.   Mouth/Throat: Oropharynx is clear and moist. No oropharyngeal exudate. Neck: Normal range of motion. Neck supple. No JVD present. No thyromegaly present. Cardiovascular: Normal rate, regular rhythm, normal heart sounds and intact distal pulses. Pulmonary/Chest: Effort normal and breath sounds normal. No respiratory distress. She has no wheezes. She has no rales. Abdominal: Soft. Bowel sounds are normal. She exhibits no distension. There is no tenderness. Musculoskeletal: Normal range of motion. She exhibits no edema or tenderness. Neurological: She is alert and oriented to person, place, and time. She has normal reflexes. No cranial nerve deficit. She exhibits normal muscle tone. Coordination normal.   Psychiatric: She has a normal mood and affect.  Her behavior is normal.       ASSESSMENT and PLAN  Diagnoses and all orders for this visit:    1. Bradycardia  On multiple meds. on digoxin and metoprolol 50 mg TID. Adv tor educe metoprolol to 25 mg TID until seen by Cardiologist.  -     REFERRAL TO CARDIOLOGY    2. Encounter for immunization  Will give,  -     Influenza virus vaccine (QUADRIVALENT PRES FREE SYRINGE) IM (95800)    3. Paroxysmal atrial fibrillation ([facility] [address]  On coumadin. cardiologist is managing dosage. on digoxin and metoprolol.  -     REFERRAL TO CARDIOLOGY  -     METABOLIC PANEL, COMPREHENSIVE    4. Essential hypertension, benign  Stable. will do,  -     metoprolol tartrate (LOPRESSOR) 50 mg tablet; Take 0.5 Tabs by mouth three (3) times daily.  -     METABOLIC PANEL, COMPREHENSIVE    5. Coronary artery disease involving native coronary artery of native heart without angina pectoris    Need to see cardiologist.  -     METABOLIC PANEL, COMPREHENSIVE        Discussed expected course/resolution/complications of diagnosis in detail with patient. Medication risks/benefits/costs/interactions/alternatives discussed with patient. Pt was given an after visit summary which includes diagnoses, current medications & vitals. Pt expressed understanding with the diagnosis and plan.

## 2017-10-09 NOTE — MR AVS SNAPSHOT
Visit Information Date & Time Provider Department Dept. Phone Encounter #  
 10/9/2017 11:15 AM Akin Linares, [address] [facility] [phone] [id number] Upcoming Health Maintenance Date Due ZOSTER VACCINE AGE 60> 10/19/2013 PAP AKA CERVICAL CYTOLOGY 4/17/2016 COLONOSCOPY 12/27/2016 INFLUENZA AGE 9 TO ADULT 8/1/2017 BREAST CANCER SCRN MAMMOGRAM 11/17/2017 DTaP/Tdap/Td series (2 - Td) 3/24/2025 Allergies as of 10/9/2017  Review Complete On: 10/9/2017 By: Akin Linares MD  
  
 Severity Noted Reaction Type Reactions Amoxicillin Low 10/08/2013   Side Effect Diarrhea  
 1 day after Doxycycline Doxycycline Low 09/21/2013   Side Effect Diarrhea  
 100mg single dose \"too strong\" and caused diarrhea Current Immunizations  Reviewed on 6/8/2017 Name Date Influenza Vaccine 11/1/2015, 9/20/2013 Influenza Vaccine (Quad) PF  Incomplete, 10/5/2016 Influenza Vaccine Split 9/1/2012 Influenza Vaccine Whole 9/1/2010 Tdap 3/24/2015 ZZZ-RETIRED (DO NOT USE) Pneumococcal Vaccine (Unspecified Type) 1/1/2010 Not reviewed this visit You Were Diagnosed With   
  
 Codes Comments Bradycardia    -  Primary ICD-10-CM: R00.1 ICD-9-CM: 427.89 Encounter for immunization     ICD-10-CM: J02 ICD-9-CM: V03.89 Paroxysmal atrial fibrillation (HCC)     ICD-10-CM: I48.0 ICD-9-CM: 427.31 Essential hypertension, benign     ICD-10-CM: I10 
ICD-9-CM: 401.1 Coronary artery disease involving native coronary artery of native heart without angina pectoris     ICD-10-CM: I25.10 ICD-9-CM: 414.01 Vitals BP Pulse Temp Resp Height(growth percentile) Weight(growth percentile) 131/56 (BP 1 Location: Left arm, BP Patient Position: Sitting) (!) 42 95.9 °F (35.5 °C) (Oral) 20 4' 8.5\" (1.435 m) 102 lb (46.3 kg) SpO2 BMI OB Status Smoking Status 99% 22.46 kg/m2 Hysterectomy Former Smoker Vitals History BMI and BSA Data Body Mass Index Body Surface Area  
 22.46 kg/m 2 1.36 m 2 Preferred Pharmacy Pharmacy Name Phone [facility] [address], [address] [facility] [phone] Your Updated Medication List  
  
   
This list is accurate as of: 10/9/17 11:55 AM.  Always use your most recent med list.  
  
  
  
  
 albuterol 90 mcg/actuation inhaler Commonly known as:  PROVENTIL HFA, VENTOLIN HFA, PROAIR HFA Take 1 Puff by inhalation every four (4) hours as needed for Wheezing. amLODIPine 5 mg tablet Commonly known as:  French Mullet TAKE ONE TABLET BY MOUTH DAILY FOR BLOOD PRESSURE  
  
 aspirin 81 mg tablet Take 81 mg by mouth daily. atorvastatin 40 mg tablet Commonly known as:  LIPITOR Take 1 Tab by mouth daily. calcium citrate-vitamin d3 315-200 mg-unit Tab Commonly known as:  CITRACAL+D Take 1 Tab by mouth daily (with breakfast). digoxin 0.125 mg tablet Commonly known as:  LANOXIN Take 1 Tab by mouth daily. fyihvma-msyoiddpu-tnqbodcpnmyl -325 mg capsule Commonly known as:  Susan Feil Take 1 Cap by mouth.  
  
 lansoprazole 30 mg capsule Commonly known as:  PREVACID Take 1 Cap by mouth Daily (before breakfast). Indications: HEARTBURN  
  
 lisinopril 40 mg tablet Commonly known as:  Mollie Ripa Take 1 Tab by mouth daily. metoprolol tartrate 50 mg tablet Commonly known as:  LOPRESSOR Take 0.5 Tabs by mouth three (3) times daily. warfarin 4 mg tablet Commonly known as:  COUMADIN Take 2 Tabs by mouth daily (with dinner). Or as directed by coumadin clinic.  
  
 zolpidem 5 mg tablet Commonly known as:  AMBIEN Take 5 mg by mouth nightly as needed for Sleep. We Performed the Following INFLUENZA VIRUS VAC QUAD,SPLIT,PRESV FREE SYRINGE IM [id number] CPT(R)] METABOLIC PANEL, COMPREHENSIVE [66611 CPT(R)] REFERRAL TO CARDIOLOGY [WRT29 Custom] Comments:  
 Bradycardia and med evaluation Referral Information Referral ID Referred By Referred To  
  
 [id number] Essie MARTE Chi, MD [address] [place], [facility] [address] Phone: [phone] Fax: [phone] Visits Status Start Date End Date 1 New Request 10/9/17 10/9/18 If your referral has a status of pending review or denied, additional information will be sent to support the outcome of this decision. Patient Instructions Vaccine Information Statement Influenza (Flu) Vaccine (Inactivated or Recombinant): What you need to know Many Vaccine Information Statements are available in Maltese and other languages. See www.immunize.org/vis Hojas de Información Sobre Vacunas están disponibles en Español y en muchos otros idiomas. Visite www.immunize.org/vis 1. Why get vaccinated? Influenza (flu) is a contagious disease that spreads around the [facility] every year, usually between October and May. Flu is caused by influenza viruses, and is spread mainly by coughing, sneezing, and close contact. Anyone can get flu. Flu strikes suddenly and can last several days. Symptoms vary by age, but can include: 
 fever/chills  sore throat  muscle aches  fatigue  cough  headache  runny or stuffy nose Flu can also lead to pneumonia and blood infections, and cause diarrhea and seizures in children. If you have a medical condition, such as heart or lung disease, flu can make it worse. Flu is more dangerous for some people. Infants and young children, people 72years of age and older, pregnant women, and people with certain health conditions or a weakened immune system are at greatest risk. Each year thousands of people in the [facility] die from flu, and many more are hospitalized. Flu vaccine can: 
 keep you from getting flu, 
 make flu less severe if you do get it, and 
 keep you from spreading flu to your family and other people. 2. Inactivated and recombinant flu vaccines A dose of flu vaccine is recommended every flu season. Children 6 months through 6years of age may need two doses during the same flu season. Everyone else needs only one dose each flu season. Some inactivated flu vaccines contain a very small amount of a mercury-based preservative called thimerosal. Studies have not shown thimerosal in vaccines to be harmful, but flu vaccines that do not contain thimerosal are available. There is no live flu virus in flu shots. They cannot cause the flu. There are many flu viruses, and they are always changing. Each year a new flu vaccine is made to protect against three or four viruses that are likely to cause disease in the upcoming flu season. But even when the vaccine doesnt exactly match these viruses, it may still provide some protection Flu vaccine cannot prevent: 
 flu that is caused by a virus not covered by the vaccine, or 
 illnesses that look like flu but are not. It takes about 2 weeks for protection to develop after vaccination, and protection lasts through the flu season. 3. Some people should not get this vaccine Tell the person who is giving you the vaccine:  If you have any severe, life-threatening allergies. If you ever had a life-threatening allergic reaction after a dose of flu vaccine, or have a severe allergy to any part of this vaccine, you may be advised not to get vaccinated. Most, but not all, types of flu vaccine contain a small amount of egg protein.  If you ever had Guillain-Barré Syndrome (also called GBS). Some people with a history of GBS should not get this vaccine. This should be discussed with your doctor.  If you are not feeling well. It is usually okay to get flu vaccine when you have a mild illness, but you might be asked to come back when you feel better. 4. Risks of a vaccine reaction With any medicine, including vaccines, there is a chance of reactions. These are usually mild and go away on their own, but serious reactions are also possible. Most people who get a flu shot do not have any problems with it. Minor problems following a flu shot include:  
 soreness, redness, or swelling where the shot was given  hoarseness  sore, red or itchy eyes  cough  fever  aches  headache  itching  fatigue If these problems occur, they usually begin soon after the shot and last 1 or 2 days. More serious problems following a flu shot can include the following:  There may be a small increased risk of Guillain-Barré Syndrome (GBS) after inactivated flu vaccine. This risk has been estimated at 1 or 2 additional cases per million people vaccinated. This is much lower than the risk of severe complications from flu, which can be prevented by flu vaccine.  Young children who get the flu shot along with pneumococcal vaccine (PCV13) and/or DTaP vaccine at the same time might be slightly more likely to have a seizure caused by fever. Ask your doctor for more information. Tell your doctor if a child who is getting flu vaccine has ever had a seizure. Problems that could happen after any injected vaccine:  People sometimes faint after a medical procedure, including vaccination. Sitting or lying down for about 15 minutes can help prevent fainting, and injuries caused by a fall. Tell your doctor if you feel dizzy, or have vision changes or ringing in the ears.  Some people get severe pain in the shoulder and have difficulty moving the arm where a shot was given. This happens very rarely.  Any medication can cause a severe allergic reaction. Such reactions from a vaccine are very rare, estimated at about 1 in a million doses, and would happen within a few minutes to a few hours after the vaccination. As with any medicine, there is a very remote chance of a vaccine causing a serious injury or death. The safety of vaccines is always being monitored. For more information, visit: www.cdc.gov/vaccinesafety/ 
 
5. What if there is a serious reaction? What should I look for?  Look for anything that concerns you, such as signs of a severe allergic reaction, very high fever, or unusual behavior. Signs of a severe allergic reaction can include hives, swelling of the face and throat, difficulty breathing, a fast heartbeat, dizziness, and weakness  usually within a few minutes to a few hours after the vaccination. What should I do?  If you think it is a severe allergic reaction or other emergency that cant wait, call 9-1-1 and get the person to the nearest hospital. Otherwise, call your doctor.  Reactions should be reported to the Vaccine Adverse Event Reporting System (VAERS). Your doctor should file this report, or you can do it yourself through  the VAERS web site at www.vaers. [facility].gov, or by calling 7-177.643.6994. VAERS does not give medical advice. 6. The National Vaccine Injury Compensation Program 
 
The [facility] Vaccine Injury Compensation Program (VICP) is a federal program that was created to compensate people who may have been injured by certain vaccines. Persons who believe they may have been injured by a vaccine can learn about the program and about filing a claim by calling 8-674.362.4410 or visiting the [employer] website at www.[facility].gov/vaccinecompensation. There is a time limit to file a claim for compensation. 7. How can I learn more?  Ask your healthcare provider. He or she can give you the vaccine package insert or suggest other sources of information.  Call your local or state health department.  Contact the Centers for Disease Control and Prevention (CDC): 
- Call 9-945.154.7361 (1-800-CDC-INFO) or 
- Visit CDCs website at www.cdc.gov/flu Vaccine Information Statement Inactivated Influenza Vaccine 8/7/2015 
42 MARU Smalls 855FE-10 Department of [facility] and [employer] Centers for Disease Control and Prevention Office Use Only Introducing [facility] SERVICES! Sweetie Randle introduces [employer] patient portal. Now you can access parts of your medical record, email your doctor's office, and request medication refills online. 1. In your internet browser, go to [email or web address] 2. Click on the First Time User? Click Here link in the Sign In box. You will see the New Member Sign Up page. 3. Enter your [employer] Access Code exactly as it appears below. You will not need to use this code after youve completed the sign-up process. If you do not sign up before the expiration date, you must request a new code. · [employer] Access Code: [email or web address] Expires: 1/1/2018  3:20 PM 
 
4. Enter the last four digits of your Social Security Number (xxxx) and Date of Birth (mm/dd/yyyy) as indicated and click Submit. You will be taken to the next sign-up page. 5. Create a [employer] ID. This will be your [employer] login ID and cannot be changed, so think of one that is secure and easy to remember. 6. Create a [employer] password. You can change your password at any time. 7. Enter your Password Reset Question and Answer. This can be used at a later time if you forget your password. 8. Enter your e-mail address. You will receive e-mail notification when new information is available in [address] 9. Click Sign Up. You can now view and download portions of your medical record. 10. Click the Download Summary menu link to download a portable copy of your medical information. If you have questions, please visit the Frequently Asked Questions section of the [employer] website. Remember, [employer] is NOT to be used for urgent needs. For medical emergencies, dial 911. Now available from your iPhone and Android! Please provide this summary of care documentation to your next provider. Your primary care clinician is listed as Dick Escudero. If you have any questions after today's visit, please call [phone].

## 2017-10-09 NOTE — PROGRESS NOTES
Health Maintenance Due   Topic Date Due    ZOSTER VACCINE AGE 60>  10/19/2013    PAP AKA CERVICAL CYTOLOGY  04/17/2016    COLONOSCOPY  12/27/2016    INFLUENZA AGE 9 TO ADULT  08/01/2017    BREAST CANCER SCRN MAMMOGRAM  11/17/2017       Chief Complaint   Patient presents with    Hypertension     4 month follow up    Coronary Artery Disease    Cholesterol Problem       1. Have you been to the ER, urgent care clinic since your last visit? Hospitalized since your last visit? No    2. Have you seen or consulted any other health care providers outside of the [address] [place] [address] since your last visit? Include any pap smears or colon screening. No    3) Do you have an Advance Directive on file? no    4) Are you interested in receiving information on Advance Directives? NO      Patient is accompanied by self I have received verbal consent from Anand Lucas to discuss any/all medical information while they are present in the room.

## 2017-10-10 LAB
ALBUMIN SERPL-MCNC: 3.9 G/DL (ref 3.6–4.8)
ALBUMIN/GLOB SERPL: 1.1 {RATIO} (ref 1.2–2.2)
ALP SERPL-CCNC: 163 IU/L (ref 39–117)
ALT SERPL-CCNC: 11 IU/L (ref 0–32)
AST SERPL-CCNC: 27 IU/L (ref 0–40)
BILIRUB SERPL-MCNC: 1.1 MG/DL (ref 0–1.2)
BUN SERPL-MCNC: 4 MG/DL (ref 8–27)
BUN/CREAT SERPL: 6 (ref 12–28)
CALCIUM SERPL-MCNC: 9.1 MG/DL (ref 8.7–10.3)
CHLORIDE SERPL-SCNC: 92 MMOL/L (ref 96–106)
CO2 SERPL-SCNC: 24 MMOL/L (ref 18–29)
CREAT SERPL-MCNC: 0.66 MG/DL (ref 0.57–1)
GLOBULIN SER CALC-MCNC: 3.4 G/DL (ref 1.5–4.5)
GLUCOSE SERPL-MCNC: 70 MG/DL (ref 65–99)
POTASSIUM SERPL-SCNC: 4.8 MMOL/L (ref 3.5–5.2)
PROT SERPL-MCNC: 7.3 G/DL (ref 6–8.5)
SODIUM SERPL-SCNC: 128 MMOL/L (ref 134–144)

## 2017-10-13 ENCOUNTER — CLINICAL SUPPORT (OUTPATIENT)
Dept: CARDIOLOGY CLINIC | Age: 62
End: 2017-10-13

## 2017-10-13 ENCOUNTER — OFFICE VISIT (OUTPATIENT)
Dept: CARDIOLOGY CLINIC | Age: 62
End: 2017-10-13

## 2017-10-13 VITALS
DIASTOLIC BLOOD PRESSURE: 90 MMHG | SYSTOLIC BLOOD PRESSURE: 130 MMHG | HEART RATE: 60 BPM | WEIGHT: 108 LBS | BODY MASS INDEX: 23.3 KG/M2 | HEIGHT: 57 IN

## 2017-10-13 DIAGNOSIS — I10 ESSENTIAL HYPERTENSION, BENIGN: Chronic | ICD-10-CM

## 2017-10-13 DIAGNOSIS — I48.0 PAROXYSMAL ATRIAL FIBRILLATION (HCC): Primary | ICD-10-CM

## 2017-10-13 DIAGNOSIS — I48.0 PAROXYSMAL ATRIAL FIBRILLATION (HCC): ICD-10-CM

## 2017-10-13 DIAGNOSIS — I34.1 MVP (MITRAL VALVE PROLAPSE): ICD-10-CM

## 2017-10-13 DIAGNOSIS — Z79.01 LONG-TERM (CURRENT) USE OF ANTICOAGULANTS: Primary | ICD-10-CM

## 2017-10-13 DIAGNOSIS — E78.00 PURE HYPERCHOLESTEROLEMIA: ICD-10-CM

## 2017-10-13 DIAGNOSIS — I34.0 MITRAL VALVE INSUFFICIENCY, UNSPECIFIED ETIOLOGY: ICD-10-CM

## 2017-10-13 LAB — INR, EXTERNAL: 1.7 (ref 2–3)

## 2017-10-13 RX ORDER — METOPROLOL TARTRATE 50 MG/1
50 TABLET ORAL 2 TIMES DAILY
Refills: 6 | COMMUNITY
Start: 2017-10-13 | End: 2017-12-13 | Stop reason: SDUPTHER

## 2017-10-13 NOTE — PROGRESS NOTES
Digoxin discontinued and Metoprolol changed to 50 mg twice daily per Dr. Brennan Postin verbal order

## 2017-10-13 NOTE — PROGRESS NOTES
HISTORY OF PRESENT ILLNESS  Willy Longoria is a 59 y.o. female     SUMMARY:   Problem List  Date Reviewed: 10/13/2017          Codes Class Noted    MVP (mitral valve prolapse) ICD-10-CM: I34.1  ICD-9-CM: 424.0  6/4/2010    Overview Addendum 1/13/2011  8:11 AM by Griselda Flores MD     Date of most recent echocardiogram was 12/23/08. Echocardiogram demonstrated normal LV wall motion and ejection fraction, left atrial enlargement, moderate mitral regurgitation and severe mitral regurgitation. The anterior leaflet was myxomatous and there was some mild prolapse             Pneumonia ICD-10-CM: J18.9  ICD-9-CM: 186  5/4/2016        Paroxysmal atrial fibrillation (HCC) ICD-10-CM: I48.0  ICD-9-CM: 427.31  5/4/2016        Alcohol abuse ICD-10-CM: F10.10  ICD-9-CM: 305.00  Unknown    Overview Signed 2/25/2016 10:43 PM by Nia Ramires     2/25/16 phone note             Elevated brain natriuretic peptide (BNP) level ICD-10-CM: R79.89  ICD-9-CM: 790.99  5/5/2015        Atrial myxoma ICD-10-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 Hallie Baird.              Cardiomyopathy ([facility] [[email or web address]]ca 75.) ICD-10-CM: I42.9  ICD-9-CM: 425.4  10/18/2013        Hyponatremia ICD-10-CM: E87.1  ICD-9-CM: 276.1  41/23/1606        Diastolic heart failure (HCC) ICD-10-CM: I50.30  ICD-9-CM: 428.30  10/17/2013        Back pain, thoracic ICD-10-CM: M54.6  ICD-9-CM: 724.1  9/25/2013        Elevated liver enzymes ICD-10-CM: R74.8  ICD-9-CM: 790.5  9/1/2013    Overview Signed 10/23/2013  6:07 PM by Hardy Morfin DO     AST, ALT, Alk P             CAD (coronary artery disease), native coronary artery ICD-10-CM: I25.10  ICD-9-CM: 414.01  6/22/2011    Overview Signed 6/22/2011 11:19 AM by Griselda Flores MD     5/11 single vessel bypass to rca at time of mitral valve repair             Mitral regurgitation ICD-10-CM: I34.0  ICD-9-CM: 424.0  3/21/2011    Overview Addendum 6/22/2011 11:19 AM by Khadar Fuentes MD     Severe by RAKESH 4- leaflets both prolapse and do not coapt. 5/11 mitral valve repair with single svg to rca. Pure hypercholesterolemia ICD-10-CM: E78.00  ICD-9-CM: 272.0  10/19/2009        Essential hypertension, benign (Chronic) ICD-10-CM: I10  ICD-9-CM: 401.1  10/19/2009        Hematuria, unspecified ICD-10-CM: R31.9  ICD-9-CM: 599.70  10/19/2009        Diffuse cystic mastopathy ICD-10-CM: N60.19  ICD-9-CM: 610.1  10/19/2009        Osteopenia ICD-10-CM: M85.80  ICD-9-CM: 733.90  10/19/2009              Current Outpatient Prescriptions on File Prior to Visit   Medication Sig    metoprolol tartrate (LOPRESSOR) 50 mg tablet Take 0.5 Tabs by mouth three (3) times daily.  amLODIPine (NORVASC) 5 mg tablet TAKE ONE TABLET BY MOUTH DAILY FOR BLOOD PRESSURE    atorvastatin (LIPITOR) 40 mg tablet Take 1 Tab by mouth daily.  warfarin (COUMADIN) 4 mg tablet Take 2 Tabs by mouth daily (with dinner). Or as directed by coumadin clinic.  lisinopril (PRINIVIL, ZESTRIL) 40 mg tablet Take 1 Tab by mouth daily.  calcium citrate-vitamin d3 (CITRACAL+D) 315-200 mg-unit tab Take 1 Tab by mouth daily (with breakfast).  digoxin (LANOXIN) 0.125 mg tablet Take 1 Tab by mouth daily.  lansoprazole (PREVACID) 30 mg capsule Take 1 Cap by mouth Daily (before breakfast). Indications: HEARTBURN    zolpidem (AMBIEN) 5 mg tablet Take 5 mg by mouth nightly as needed for Sleep.  aspirin 81 mg tablet Take 81 mg by mouth daily.  albuterol (PROVENTIL HFA, VENTOLIN HFA, PROAIR HFA) 90 mcg/actuation inhaler Take 1 Puff by inhalation every four (4) hours as needed for Wheezing.  ekssorv-cjzpwgrjk-jtrxjkwqtdkw (MIDRIN) -325 mg capsule Take 1 Cap by mouth. No current facility-administered medications on file prior to visit.         CARDIOLOGY STUDIES TO DATE:  2/14 transthorasic echo lvef 45% brenda, intact mitral valve repair with mod mr, mod tr pa pressure 49mm, right atrial mass, sessile  3/14 RAKESH lvef 55% mild to mod mr, mild tr 2by 2cm right atrial mass near septum   5/16 echo normal lvef, brenda, normally functioning bioprosthetic mv, mild mr, mod tr pa pressure 67mm        Chief Complaint   Patient presents with    Irregular Heart Beat     HPI :  Ms. Oswaldo Gardner is getting over an upper respiratory infection and her INR today is only 1.7. She has been on some antibiotics, which may have had something to do with it. She recently saw Dr. Rhett Mcbride and her heart rate was slow, so her Toprol was cut back to 25 mg three times a day. Her EKG today shows slow atrial fibrillation with PVCs or aberrancy. Interestingly, she had always had trouble with rapid atrial fibrillation in the past, so it is possible she may be developing sick sinus syndrome and end up ultimately with a pacemaker and we talked about that. CARDIAC ROS:   negative for chest pain, dyspnea, palpitations, syncope, orthopnea, paroxysmal nocturnal dyspnea, exertional chest pressure/discomfort, claudication, lower extremity edema    Family History   Problem Relation Age of Onset    Heart Surgery Mother      CABG    Hypertension Mother     High Cholesterol Mother     Cancer Father      bone    Diabetes Sister     High Cholesterol Brother     Cancer Maternal Grandmother        Past Medical History:   Diagnosis Date    Alcohol abuse     2/25/16 phone note    Anasarca 10/18/13    per CT. Dr. Darshana Kendrick.  Atrial myxoma 06/06/14    Dr. Marielle Valencia.  CAD (coronary artery disease)     Diastolic heart failure ([facility] [address] 10/15/13    Dr. Zabrina Addison.  Diverticulosis 12/27/11    Dr. Raul Vogel.  DJD (degenerative joint disease) of knee     Dr. Felisha Haas Elevated cholesterol     Elevated liver enzymes 09/2013    AST, ALT, Alk P    Heartburn     Hepatic steatosis 10/18/13    Hypertension     Low sodium 06/06/14    admitted to [facility]    Migraine     MVP (mitral valve prolapse)     w/MR  Dr. Lena Okeefe.    Gemini Kang. Dr. Fritz Harvey.  Osteopenia 03/04/08    Ovarian cyst, bilateral     PAF (paroxysmal atrial fibrillation) ([facility] [address] 03/21/11    New. Dr. Marcos Merrill. Dr. Gemini Kang. Dr. Scott Chen Severe mitral regurgitation by prior echocardiogram 03/21/11    Dr. Osmani Mueller. Dr. Chavez Grammjonny Uterine fibroid        GENERAL ROS:  A comprehensive review of systems was negative except for that written in the HPI.     Visit Vitals    /90    Pulse 60    Ht 4' 8.5\" (1.435 m)    Wt 108 lb (49 kg)    BMI 23.79 kg/m2       Wt Readings from Last 3 Encounters:   10/13/17 108 lb (49 kg)   10/09/17 102 lb (46.3 kg)   10/03/17 104 lb (47.2 kg)            BP Readings from Last 3 Encounters:   10/13/17 130/90   10/09/17 131/56   10/03/17 138/82       PHYSICAL EXAM  General appearance: alert, cooperative, no distress, appears stated age  Neck: supple, symmetrical, trachea midline, no adenopathy, no carotid bruit and no JVD  Lungs: clear to auscultation bilaterally  Heart: irregularly irregular rhythm, S1, S2 normal, no S3 or S4  Extremities: extremities normal, atraumatic, no cyanosis or edema    Lab Results   Component Value Date/Time    Cholesterol, total 188 06/08/2017 02:03 PM    Cholesterol, total 223 10/20/2016 10:15 AM    Cholesterol, total 181 10/28/2015 10:27 AM    Cholesterol, total 192 03/27/2015 08:50 AM    Cholesterol, total 105 07/17/2014 04:24 AM    HDL Cholesterol 50 06/08/2017 02:03 PM    HDL Cholesterol 57 10/20/2016 10:15 AM    HDL Cholesterol 68 10/28/2015 10:27 AM    HDL Cholesterol 61 03/27/2015 08:50 AM    HDL Cholesterol 45 07/17/2014 04:24 AM    LDL, calculated 124 06/08/2017 02:03 PM    LDL, calculated 154 10/20/2016 10:15 AM    LDL, calculated 94 10/28/2015 10:27 AM    LDL, calculated 111 03/27/2015 08:50 AM    LDL, calculated 45.2 07/17/2014 04:24 AM    Triglyceride 69 06/08/2017 02:03 PM    Triglyceride 60 10/20/2016 10:15 AM    Triglyceride 96 10/28/2015 10:27 AM    Triglyceride 98 03/27/2015 08:50 AM    Triglyceride 74 07/17/2014 04:24 AM    CHOL/HDL Ratio 2.3 07/17/2014 04:24 AM    CHOL/HDL Ratio 3.1 08/09/2011 04:25 AM     ASSESSMENT  Ms. Mirlande Forte as I said may be developing sick sinus syndrome. We are going to stop her Digoxin and change her Metoprolol to 50 mg twice a day rather than three times a day, which she was on before and I think she will tolerate that just fine. Her LDL was still a little elevated in June and we talked about that. She is on max dose therapy right now. She needs no cardiac testing at this time. current treatment plan is effective, no change in therapy  lab results and schedule of future lab studies reviewed with patient  reviewed diet, exercise and weight control    Encounter Diagnoses   Name Primary?  Paroxysmal atrial fibrillation (HCC) Yes    Mitral valve insufficiency, unspecified etiology     MVP (mitral valve prolapse)     Pure hypercholesterolemia      Orders Placed This Encounter    AMB POC EKG ROUTINE W/ 12 LEADS, INTER & REP       Follow-up Disposition:  Return in about 6 months (around 4/13/2018).     Jason Tanner MD  10/13/2017

## 2017-10-13 NOTE — MR AVS SNAPSHOT
Visit Information Date & Time Provider Department Dept. Phone Encounter #  
 10/13/2017  2:20 PM Tila Hurst MD [facility] [facility] [phone] [id number] Follow-up Instructions Return in about 6 months (around 4/13/2018). Your Appointments 10/27/2017  2:40 PM  
COUMADIN CLINIC with ROSE FRAUSTO [facility] ([facility]AR[facility] SCHEDULING) Appt Note: 2 week INR check [address] [place]  
[address] [address] Upcoming Health Maintenance Date Due ZOSTER VACCINE AGE 60> 10/19/2013 PAP AKA CERVICAL CYTOLOGY 4/17/2016 COLONOSCOPY 12/27/2016 BREAST CANCER SCRN MAMMOGRAM 11/17/2017 DTaP/Tdap/Td series (2 - Td) 3/24/2025 Allergies as of 10/13/2017  Review Complete On: 10/13/2017 By: Tila Hurst MD  
  
 Severity Noted Reaction Type Reactions Amoxicillin Low 10/08/2013   Side Effect Diarrhea  
 1 day after Doxycycline Doxycycline Low 09/21/2013   Side Effect Diarrhea  
 100mg single dose \"too strong\" and caused diarrhea Current Immunizations  Reviewed on 10/9/2017 Name Date Influenza Vaccine 11/1/2015, 9/20/2013 Influenza Vaccine (Quad) PF 10/9/2017, 10/5/2016 Influenza Vaccine Split 9/1/2012 Influenza Vaccine Whole 9/1/2010 Tdap 3/24/2015 ZZZ-RETIRED (DO NOT USE) Pneumococcal Vaccine (Unspecified Type) 1/1/2010 Not reviewed this visit You Were Diagnosed With   
  
 Codes Comments Paroxysmal atrial fibrillation (HCC)    -  Primary ICD-10-CM: I48.0 ICD-9-CM: 427.31 Mitral valve insufficiency, unspecified etiology     ICD-10-CM: I34.0 ICD-9-CM: 424.0 MVP (mitral valve prolapse)     ICD-10-CM: I34.1 ICD-9-CM: 424.0 Pure hypercholesterolemia     ICD-10-CM: E78.00 ICD-9-CM: 272.0 Essential hypertension, benign     ICD-10-CM: I10 
ICD-9-CM: 401.1 Vitals BP Pulse Height(growth percentile) Weight(growth percentile) BMI OB Status 130/90 60 4' 8.5\" (1.435 m) 108 lb (49 kg) 23.79 kg/m2 Hysterectomy Smoking Status Former Smoker Vitals History BMI and BSA Data Body Mass Index Body Surface Area  
 23.79 kg/m 2 1.4 m 2 Preferred Pharmacy Pharmacy Name Phone [facility] [phone] [address] [phone] Your Updated Medication List  
  
   
This list is accurate as of: 10/13/17  3:03 PM.  Always use your most recent med list. amLODIPine 5 mg tablet Commonly known as:  Juan Haven TAKE ONE TABLET BY MOUTH DAILY FOR BLOOD PRESSURE  
  
 aspirin 81 mg tablet Take 81 mg by mouth daily. atorvastatin 40 mg tablet Commonly known as:  LIPITOR Take 1 Tab by mouth daily. calcium citrate-vitamin d3 315-200 mg-unit Tab Commonly known as:  CITRACAL+D Take 1 Tab by mouth daily (with breakfast). bhynico-wadubqedt-bcwisckqbnmy -325 mg capsule Commonly known as:  Gerhardt Bolds Take 1 Cap by mouth.  
  
 lansoprazole 30 mg capsule Commonly known as:  PREVACID Take 1 Cap by mouth Daily (before breakfast). Indications: HEARTBURN  
  
 lisinopril 40 mg tablet Commonly known as:  Nonah Lexii Take 1 Tab by mouth daily. metoprolol tartrate 50 mg tablet Commonly known as:  LOPRESSOR Take 0.5 Tabs by mouth three (3) times daily. warfarin 4 mg tablet Commonly known as:  COUMADIN Take 2 Tabs by mouth daily (with dinner). Or as directed by coumadin clinic.  
  
 zolpidem 5 mg tablet Commonly known as:  AMBIEN Take 5 mg by mouth nightly as needed for Sleep. We Performed the Following AMB POC EKG ROUTINE W/ 12 LEADS, INTER & REP [25548 CPT(R)] Follow-up Instructions Return in about 6 months (around 4/13/2018). Introducing [facility]! To [employer] introduces [employer] patient portal. Now you can access parts of your medical record, email your doctor's office, and request medication refills online. 1. In your internet browser, go to [email or web address] It [email or web address] 2. Click on the First Time User? Click Here link in the Sign In box. You will see the New Member Sign Up page. 3. Enter your Liquid[employer]ass Access Code exactly as it appears below. You will not need to use this code after youve completed the sign-up process. If you do not sign up before the expiration date, you must request a new code. · Liquid[employer]ass Access Code: [email or web address] Expires: 1/1/2018  3:20 PM 
 
4. Enter the last four digits of your Social Security Number (xxxx) and Date of Birth (mm/dd/yyyy) as indicated and click Submit. You will be taken to the next sign-up page. 5. Create a LiquidCompass ID. This will be your Liquid[employer] login ID and cannot be changed, so think of one that is secure and easy to remember. 6. Create a LiquidCompass password. You can change your password at any time. 7. Enter your Password Reset Question and Answer. This can be used at a later time if you forget your password. 8. Enter your e-mail address. You will receive e-mail notification when new information is available in [address] 9. Click Sign Up. You can now view and download portions of your medical record. 10. Click the Download Summary menu link to download a portable copy of your medical information. If you have questions, please visit the Frequently Asked Questions section of the LiquidCompass website. Remember, LiquidCompass is NOT to be used for urgent needs. For medical emergencies, dial 911. Now available from your iPhone and Android! Please provide this summary of care documentation to your next provider. Your primary care clinician is listed as John Basilio. If you have any questions after today's visit, please call [phone].

## 2017-10-13 NOTE — PROGRESS NOTES
A full discussion of the nature of anticoagulants has been carried out. A benefit risk analysis has been presented to the patient, so that they understand the justification for choosing anticoagulation at this time. The need for frequent and regular monitoring, precise dosage adjustment and compliance is stressed. Side effects of potential bleeding are discussed. The patient should avoid any OTC items containing aspirin or ibuprofen, and should avoid great swings in general diet. Avoid alcohol consumption. Call if any signs of abnormal bleeding. Next PT/INR test in 2 weeks. Denies any missed doses. Patient to take 10 mg tonight and tomorrow then resume regimen of 8 mg daily. Patient seen by Dr. Isabela De La Fuente today.

## 2017-10-31 DIAGNOSIS — I10 ESSENTIAL HYPERTENSION, BENIGN: ICD-10-CM

## 2017-11-09 RX ORDER — METOPROLOL TARTRATE 50 MG/1
TABLET ORAL
Qty: 90 TAB | Refills: 5 | Status: SHIPPED | OUTPATIENT
Start: 2017-11-09 | End: 2019-11-13 | Stop reason: ALTCHOICE

## 2017-11-09 RX ORDER — METOPROLOL TARTRATE 50 MG/1
TABLET ORAL
Qty: 90 TAB | Refills: 5 | Status: SHIPPED | OUTPATIENT
Start: 2017-11-09 | End: 2017-11-09 | Stop reason: SDUPTHER

## 2017-11-09 NOTE — TELEPHONE ENCOUNTER
Requested Prescriptions     Signed Prescriptions Disp Refills    metoprolol tartrate (LOPRESSOR) 50 mg tablet 90 Tab 5     Sig: TAKE ONE TABLET BY MOUTH THREE TIMES A DAY     Authorizing Provider: Norris Payor     Ordering User: Elisabeth Cortes    Per Dr. Rafy Medina verbal orders

## 2017-12-13 ENCOUNTER — OFFICE VISIT (OUTPATIENT)
Dept: INTERNAL MEDICINE CLINIC | Age: 62
End: 2017-12-13

## 2017-12-13 VITALS
SYSTOLIC BLOOD PRESSURE: 121 MMHG | BODY MASS INDEX: 22.76 KG/M2 | DIASTOLIC BLOOD PRESSURE: 77 MMHG | TEMPERATURE: 96.2 F | HEART RATE: 76 BPM | WEIGHT: 101.2 LBS | OXYGEN SATURATION: 94 % | RESPIRATION RATE: 18 BRPM | HEIGHT: 56 IN

## 2017-12-13 DIAGNOSIS — I48.0 PAROXYSMAL ATRIAL FIBRILLATION (HCC): ICD-10-CM

## 2017-12-13 DIAGNOSIS — R04.0 EPISTAXIS: Primary | ICD-10-CM

## 2017-12-13 NOTE — MR AVS SNAPSHOT
Visit Information Date & Time Provider Department Dept. Phone Encounter #  
 12/13/2017  2:00 PM Tiffani Pacheco NP [facility] Medicine [phone] [id number] Your Appointments 4/13/2018  2:00 PM  
ESTABLISHED PATIENT with Bret Murphy MD  
[facility] ([address]) Appt Note: 6 mo f/u  
 [address] [facility] [address] [place] [address]  
[address] [facility] [address] Upcoming Health Maintenance Date Due ZOSTER VACCINE AGE 60> 10/19/2013 PAP AKA CERVICAL CYTOLOGY 4/17/2016 COLONOSCOPY 12/27/2016 BREAST CANCER SCRN MAMMOGRAM 11/17/2017 DTaP/Tdap/Td series (2 - Td) 3/24/2025 Allergies as of 12/13/2017  Review Complete On: 12/13/2017 By: Tiffani Pacheco NP Severity Noted Reaction Type Reactions Amoxicillin Low 10/08/2013   Side Effect Diarrhea  
 1 day after Doxycycline Doxycycline Low 09/21/2013   Side Effect Diarrhea  
 100mg single dose \"too strong\" and caused diarrhea Current Immunizations  Reviewed on 10/9/2017 Name Date Influenza Vaccine 11/1/2015, 9/20/2013 Influenza Vaccine (Quad) PF 10/9/2017, 10/5/2016 Influenza Vaccine Split 9/1/2012 Influenza Vaccine Whole 9/1/2010 Tdap 3/24/2015 ZZZ-RETIRED (DO NOT USE) Pneumococcal Vaccine (Unspecified Type) 1/1/2010 Not reviewed this visit You Were Diagnosed With   
  
 Codes Comments Epistaxis    -  Primary ICD-10-CM: R04.0 ICD-9-CM: 268. 7 Vitals BP Pulse Temp Resp Height(growth percentile) Weight(growth percentile) 121/77 (BP 1 Location: Left arm, BP Patient Position: Sitting) 76 96.2 °F (35.7 °C) (Oral) 18 4' 8\" (1.422 m) 101 lb 3.2 oz (45.9 kg) SpO2 BMI OB Status Smoking Status 94% 22.69 kg/m2 Hysterectomy Former Smoker Vitals History BMI and BSA Data  Body Mass Index Body Surface Area  
 22.69 kg/m 2 1.35 m 2  
  
  
 Preferred Pharmacy Pharmacy Name Phone [address] , [place] [address] [phone] Your Updated Medication List  
  
   
This list is accurate as of: 12/13/17  2:40 PM.  Always use your most recent med list. amLODIPine 5 mg tablet Commonly known as:  Kenyatta Croon TAKE ONE TABLET BY MOUTH DAILY FOR BLOOD PRESSURE  
  
 aspirin 81 mg tablet Take 81 mg by mouth daily. atorvastatin 40 mg tablet Commonly known as:  LIPITOR Take 1 Tab by mouth daily. calcium citrate-vitamin d3 315-200 mg-unit Tab Commonly known as:  CITRACAL+D Take 1 Tab by mouth daily (with breakfast). ddqxmat-fzqaevmus-mgvqdmeyukio -325 mg capsule Commonly known as:  Tharon Beers Take 1 Cap by mouth.  
  
 lansoprazole 30 mg capsule Commonly known as:  PREVACID Take 1 Cap by mouth Daily (before breakfast). Indications: HEARTBURN  
  
 lisinopril 40 mg tablet Commonly known as:  Manav Economy Take 1 Tab by mouth daily. metoprolol tartrate 50 mg tablet Commonly known as:  LOPRESSOR  
TAKE ONE TABLET BY MOUTH THREE TIMES A DAY  
  
 warfarin 4 mg tablet Commonly known as:  COUMADIN Take 2 Tabs by mouth daily (with dinner). Or as directed by coumadin clinic.  
  
 zolpidem 5 mg tablet Commonly known as:  AMBIEN Take 5 mg by mouth nightly as needed for Sleep. Introducing [facility]! Cherie Thibodeaux introduces [employer] patient portal. Now you can access parts of your medical record, email your doctor's office, and request medication refills online. 1. In your internet browser, go to [email or web address]/[email or web address] 2. Click on the First Time User? Click Here link in the Sign In box. You will see the New Member Sign Up page. 3. Enter your [employer] Access Code exactly as it appears below. You will not need to use this code after youve completed the sign-up process.  If you do not sign up before the expiration date, you must request a new code. · N[employer]ee Access Code: [email or web address] Expires: 1/1/2018  2:20 PM 
 
4. Enter the last four digits of your Social Security Number (xxxx) and Date of Birth (mm/dd/yyyy) as indicated and click Submit. You will be taken to the next sign-up page. 5. Create a Nubee ID. This will be your Nubee login ID and cannot be changed, so think of one that is secure and easy to remember. 6. Create a Nubee password. You can change your password at any time. 7. Enter your Password Reset Question and Answer. This can be used at a later time if you forget your password. 8. Enter your e-mail address. You will receive e-mail notification when new information is available in [address] 9. Click Sign Up. You can now view and download portions of your medical record. 10. Click the Download Summary menu link to download a portable copy of your medical information. If you have questions, please visit the Frequently Asked Questions section of the Nubee website. Remember, Nubee is NOT to be used for urgent needs. For medical emergencies, dial 911. Now available from your iPhone and Android! Please provide this summary of care documentation to your next provider. Your primary care clinician is listed as Isabel Stephens. If you have any questions after today's visit, please call [phone].

## 2017-12-13 NOTE — PROGRESS NOTES
Subjective:     Chief Complaint   Patient presents with    Follow-up     ER 12/12/17    Hypertension       History of Present Illness    Juan A Pino is a 59y.o. year old female who is a patient of Dr. Torres Tran that presents today with follow-up after a ED visit yesterday for epistasis. She denies a hx of the same. She is currently on Coumadin for A fib. Packing was placed in the ED yesterday. She reports her INR was stable. She followed up with Dr. Barton Son, ENT today to have the packing taken out. She reports no bleeding after the packing was d/c'ed. However, she sneezed in the office today and began with minor bleeding from left nare. Bleeding was controlled and stopped during visit. She was instructed to follow-up with Dr. Oralia Banegas for any uncontrolled epistasis. She denies any other complaints at this time. No CP, SOB, GI, or  symptoms. Reviewed medications, recent lab work and imaging with patient. Pt reports compliance with medications. Current Outpatient Prescriptions on File Prior to Visit   Medication Sig Dispense Refill    metoprolol tartrate (LOPRESSOR) 50 mg tablet TAKE ONE TABLET BY MOUTH THREE TIMES A DAY 90 Tab 5    amLODIPine (NORVASC) 5 mg tablet TAKE ONE TABLET BY MOUTH DAILY FOR BLOOD PRESSURE 30 Tab 5    warfarin (COUMADIN) 4 mg tablet Take 2 Tabs by mouth daily (with dinner). Or as directed by coumadin clinic. 60 Tab 5    lisinopril (PRINIVIL, ZESTRIL) 40 mg tablet Take 1 Tab by mouth daily. 90 Tab 3    calcium citrate-vitamin d3 (CITRACAL+D) 315-200 mg-unit tab Take 1 Tab by mouth daily (with breakfast). 30 Tab 12    lansoprazole (PREVACID) 30 mg capsule Take 1 Cap by mouth Daily (before breakfast). Indications: HEARTBURN 30 Cap 5    zolpidem (AMBIEN) 5 mg tablet Take 5 mg by mouth nightly as needed for Sleep.  aspirin 81 mg tablet Take 81 mg by mouth daily.  atorvastatin (LIPITOR) 40 mg tablet Take 1 Tab by mouth daily.  30 Tab 5    cyexijl-nfjwqzatf-zffjehkuprch (MIDRIN) -325 mg capsule Take 1 Cap by mouth. No current facility-administered medications on file prior to visit. Allergies   Allergen Reactions    Amoxicillin Diarrhea     1 day after Doxycycline    Doxycycline Diarrhea     100mg single dose \"too strong\" and caused diarrhea      Past Medical History:   Diagnosis Date    Alcohol abuse     2/25/16 phone note    Anasarca 10/18/13    per CT. Dr. Haresh Hameed.  Atrial myxoma 06/06/14    Dr. Vianca Vazquez.  CAD (coronary artery disease)     Diastolic heart failure ([address] 10/15/13    Dr. Gino Robles.  Diverticulosis 12/27/11    Dr. Ronald Jimenes.  DJD (degenerative joint disease) of knee     Dr. Basil Rizzo Elevated cholesterol     Elevated liver enzymes 09/2013    AST, ALT, Alk P    Heartburn     Hepatic steatosis 10/18/13    Hypertension     Low sodium 06/06/14    [facility]    Migraine     MVP (mitral valve prolapse)     w/MR  Dr. Kassandra Guadalupe. Dr. Rob Fish. Dr. Vianca Vazquez.  Osteopenia 03/04/08    Ovarian cyst, bilateral     PAF (paroxysmal atrial fibrillation) [address] [address] 03/21/11    New. Dr. Evi Ramirez. Dr. Rob Fish. Dr. Angela Clark Severe mitral regurgitation by prior echocardiogram 03/21/11    Dr. Marie Mcdonald. Dr. Angela Clark Uterine fibroid       Past Surgical History:   Procedure Laterality Date    CARDIAC SURG PROCEDURE UNLIST  5/23/2011    CABG x 1 vessel, MV REPAIR. Dr. Vianca Vazquez.  COLONOSCOPY  2006, 12/27/11    Dr. Ronald Jimense.   due q 5-10 yrs    HX BUNIONECTOMY  2000, 1990's    Rt 2000, Lt 1990s    HX CYST INCISION AND DRAINAGE      HX HEART CATHETERIZATION  04/12/11    Dr. Rob Fish    HX HYSTEROSCOPY WITH ENDOMETRIAL ABLATION  11/2004    HX KNEE ARTHROSCOPY Left     HX MOHS PROCEDURES  1998 L, 2001 R    HX MYOMECTOMY  11/2004    HX OVARIAN CYST REMOVAL Right 2004    HX WOLFGANG AND BSO  2005    HX TUBAL LIGATION        Social History   Substance Use Topics  Smoking status: Former Smoker     Packs/day: 0.25     Years: 23.00     Types: Cigarettes     Quit date: 1/7/2016    Smokeless tobacco: Never Used      Comment: very rarely cigarettes--no cigarettes X 2 weeks    Alcohol use No      Comment: as of 02/21/16      Family History   Problem Relation Age of Onset    Heart Surgery Mother      CABG    Hypertension Mother     High Cholesterol Mother     Cancer Father      bone    Diabetes Sister     High Cholesterol Brother     Cancer Maternal Grandmother         Objective:     Vitals:    12/13/17 1424   BP: 121/77   Pulse: 76   Resp: 18   Temp: 96.2 °F (35.7 °C)   TempSrc: Oral   SpO2: 94%   Weight: 101 lb 3.2 oz (45.9 kg)   Height: 4' 8\" (1.422 m)       Review of Systems   Constitutional: Negative. HENT: Positive for nosebleeds. Eyes: Negative. Respiratory: Negative. Cardiovascular: Negative. Gastrointestinal: Negative. Genitourinary: Negative. Musculoskeletal: Negative. Skin: Negative. Neurological: Negative. Psychiatric/Behavioral: Negative. Physical Exam   Constitutional: She is oriented to person, place, and time. Vital signs are normal. She appears well-developed and well-nourished. Well-appearing AA female. NAD   HENT:   Head: Normocephalic and atraumatic. Nose: Epistaxis is observed. Eyes: Conjunctivae and EOM are normal. Pupils are equal, round, and reactive to light. Neck: Normal range of motion. Neck supple. Cardiovascular: Normal rate, regular rhythm and normal heart sounds. Pulmonary/Chest: Effort normal and breath sounds normal. No respiratory distress. She has no wheezes. Abdominal: She exhibits no distension. Musculoskeletal: Normal range of motion. She exhibits no edema, tenderness or deformity. Neurological: She is alert and oriented to person, place, and time. Skin: Skin is warm and dry. No rash noted. No erythema. No pallor. Psychiatric: She has a normal mood and affect.  Her behavior is normal.   Nursing note and vitals reviewed. Assessment/ Plan:   Diagnoses and all orders for this visit:    1. Epistaxis    Patient's plan of care has been reviewed with them. Patient and/or family have verbally conveyed their understanding and agreement of the patient's signs, symptoms, diagnosis, treatment and prognosis and additionally agree to follow up as recommended or return to [facility] should their condition change prior to follow-up. Discharge instructions have also been provided to the patient with some educational information regarding their diagnosis as well a list of reasons why they would want to return to the office prior to their follow-up appointment should their condition change. Follow-up with Dr. Blair Schumacher as scheduled.

## 2017-12-13 NOTE — PATIENT INSTRUCTIONS
Nosebleeds: Care Instructions  Your Care Instructions    Nosebleeds are common, especially if you have colds or allergies. Many things can cause a nosebleed. Some nosebleeds stop on their own with pressure. Others need packing. Some get cauterized (sealed). If you have gauze or other packing materials in your nose, you will need to follow up with your doctor to have the packing removed. You may need more treatment if you get nosebleeds a lot. The doctor has checked you carefully, but problems can develop later. If you notice any problems or new symptoms, get medical treatment right away. Follow-up care is a key part of your treatment and safety. Be sure to make and go to all appointments, and call your doctor if you are having problems. It's also a good idea to know your test results and keep a list of the medicines you take. How can you care for yourself at home? · If you get another nosebleed:  ¨ Sit up and tilt your head slightly forward. This keeps blood from going down your throat. ¨ Use your thumb and index finger to pinch your nose shut for 10 minutes. Use a clock. Do not check to see if the bleeding has stopped before the 10 minutes are up. If the bleeding has not stopped, pinch your nose shut for another 10 minutes. ¨ When the bleeding has stopped, try not to pick, rub, or blow your nose for 12 hours. Avoiding these things helps keep your nose from bleeding again. · If your doctor prescribed antibiotics, take them as directed. Do not stop taking them just because you feel better. You need to take the full course of antibiotics. To prevent nosebleeds  · Do not blow your nose too hard. · Try not to lift or strain after a nosebleed. · Raise your head on a pillow while you sleep. · Put a thin layer of a saline- or water-based nasal gel, such as NasoGel, inside your nose. Put it on the septum, which divides your nostrils. This will prevent dryness that can cause nosebleeds.   · Use a vaporizer or humidifier to add moisture to your bedroom. Follow the directions for cleaning the machine. · Do not use aspirin, ibuprofen (Advil, Motrin), or naproxen (Aleve) for 36 to 48 hours after a nosebleed unless your doctor tells you to. You can use acetaminophen (Tylenol) for pain relief. · Talk to your doctor about stopping any other medicines you are taking. Some medicines may make you more likely to get a nosebleed. · Do not use cold medicines or nasal sprays without first talking to your doctor. They can make your nose dry. When should you call for help? Call 911 anytime you think you may need emergency care. For example, call if:  ? · You passed out (lost consciousness). ?Call your doctor now or seek immediate medical care if:  ? · You get another nosebleed and your nose is still bleeding after you have applied pressure 3 times for 10 minutes each time (30 minutes total). ? · There is a lot of blood running down the back of your throat even after you pinch your nose and tilt your head forward. ? · You have a fever. ? · You have sinus pain. ? Watch closely for changes in your health, and be sure to contact your doctor if:  ? · You get nosebleeds often, even if they stop. ? · You do not get better as expected. Where can you learn more? Go to http://payam-laurence.info/. Enter S156 in the search box to learn more about \"Nosebleeds: Care Instructions. \"  Current as of: March 20, 2017  Content Version: 11.4  © 1046-1984 [employer]. Care instructions adapted under license by [employer] (which disclaims liability or warranty for this information). If you have questions about a medical condition or this instruction, always ask your healthcare professional. [address] any warranty or liability for your use of this information.        Taking Warfarin Safely: Care Instructions  Your Care Instructions    Warfarin is a medicine that you take to prevent blood clots. It is often called a blood thinner. Doctors give warfarin (such as Coumadin) to reduce the risk of blood clots. You may be at risk for blood clots if you have atrial fibrillation or deep vein thrombosis. Some other health problems may also put you at risk. Warfarin slows the amount of time it takes for your blood to clot. It can cause bleeding problems. Even if you've been taking warfarin for a while, it's important to know how to take it safely. Foods and other medicines can affect the way warfarin works. Some can make warfarin work too well. This can cause bleeding problems. And some can make it work poorly, so that it does not prevent blood clots very well. You will need regular blood tests to check how long it takes for your blood to form a clot. This test is called a PT or prothrombin time test. The result of the test is called an INR level. Depending on the test results, your doctor or anticoagulation clinic may adjust your dose of warfarin. Follow-up care is a key part of your treatment and safety. Be sure to make and go to all appointments, and call your doctor if you are having problems. It's also a good idea to know your test results and keep a list of the medicines you take. How can you care for yourself at home? Take warfarin safely  · Take your warfarin at the same time each day. · If you miss a dose of warfarin, don't take an extra dose to make up for it. Your doctor can tell you exactly what to do so you don't take too much or too little. · Wear medical alert jewelry that lets others know that you take warfarin. You can buy this at most drugstores. · Don't take warfarin if you are pregnant or planning to get pregnant. Talk to your doctor about how you can prevent getting pregnant while you are taking it. · Don't change your dose or stop taking warfarin unless your doctor tells you to.   Effects of medicines and food on warfarin  · Don't start or stop taking any medicines, vitamins, or natural remedies unless you first talk to your doctor. Many medicines can affect how warfarin works. These include aspirin and other pain relievers, over-the-counter medicines, multivitamins, dietary supplements, and herbal products. · Tell all of your doctors and pharmacists that you take warfarin. Some prescription medicines can affect how warfarin works. · Keep the amount of vitamin K in your diet about the same from day to day. Do not suddenly eat a lot more or a lot less food that is rich in vitamin K than you usually do. Vitamin K affects how warfarin works and how your blood clots. Talk with your doctor before making big changes in your diet. Foods that have a lot of vitamin K include cooked green vegetables, such as:  ¨ Kale, spinach, turnip greens, sujata greens, Swiss chard, and mustard greens. ¨ [place] sprouts, broccoli, and asparagus. · Limit your use of alcohol. Avoid bleeding by preventing falls and injuries  · Wear slippers or shoes with nonskid soles. · Remove throw rugs and clutter. · Rearrange furniture and electrical cords to keep them out of walking paths. · Keep stairways, porches, and outside walkways well lit. Use night-lights in hallways and bathrooms. · Be extra careful when you work with sharp tools or knives. When should you call for help? Call 911 anytime you think you may need emergency care. For example, call if:  ? · You have a sudden, severe headache that is different from past headaches. ?Call your doctor now or seek immediate medical care if:  ? · You have any abnormal bleeding, such as:  ¨ Nosebleeds. ¨ Vaginal bleeding that is different (heavier, more frequent, at a different time of the month) than what you are used to. ¨ Bloody or black stools, or rectal bleeding. ¨ Bloody or pink urine. ? Watch closely for changes in your health, and be sure to contact your doctor if you have any problems. Where can you learn more?   Go to http://payam-laurence.info/. Enter P752 in the search box to learn more about \"Taking Warfarin Safely: Care Instructions. \"  Current as of: September 21, 2016  Content Version: 11.4  © 6759-2068 [employer]. Care instructions adapted under license by [employer] (which disclaims liability or warranty for this information). If you have questions about a medical condition or this instruction, always ask your healthcare professional. [place] [address] any warranty or liability for your use of this information.

## 2017-12-13 NOTE — PROGRESS NOTES
Health Maintenance Due   Topic Date Due    ZOSTER VACCINE AGE 60>  10/19/2013    PAP AKA CERVICAL CYTOLOGY  04/17/2016    COLONOSCOPY  12/27/2016    BREAST CANCER SCRN MAMMOGRAM  11/17/2017       Chief Complaint   Patient presents with    Follow-up     ER 12/12/17    Hypertension       1. Have you been to the ER, urgent care clinic since your last visit? Hospitalized since your last visit? Yes When: 12/12/17 Where: [address] [place] [address] Reason for visit: Nose bleeding - Coumadin    2. Have you seen or consulted any other health care providers outside of the [address] [place] [address] since your last visit? Include any pap smears or colon screening. No    3) Do you have an Advance Directive on file? no    4) Are you interested in receiving information on Advance Directives? NO      Patient is accompanied by self I have received verbal consent from Gurdeep Burton to discuss any/all medical information while they are present in the room.     Was prescribed amoxicillin 12/12/17 by Yaneth Munoz, allergy indicated for Amoxicillin

## 2018-01-02 ENCOUNTER — TELEPHONE (OUTPATIENT)
Dept: CARDIOLOGY CLINIC | Age: 63
End: 2018-01-02

## 2018-05-04 DIAGNOSIS — M85.80 OSTEOPENIA: ICD-10-CM

## 2018-06-13 DIAGNOSIS — I10 ESSENTIAL HYPERTENSION, BENIGN: ICD-10-CM

## 2018-06-14 RX ORDER — AMLODIPINE BESYLATE 5 MG/1
TABLET ORAL
Qty: 30 TAB | Refills: 0 | Status: SHIPPED | OUTPATIENT
Start: 2018-06-14 | End: 2019-06-19

## 2018-06-14 NOTE — TELEPHONE ENCOUNTER
Requested Prescriptions     Signed Prescriptions Disp Refills    amLODIPine (NORVASC) 5 mg tablet 30 Tab 0     Sig: TAKE ONE TABLET BY MOUTH DAILY FOR BLOOD PRESSURE     Authorizing Provider: Josy Green     Ordering User: Poncho Becerra     Per Dr. Kirti Schulte verbal orders     Note made to pharmacy: PATIENT NEEDS TO MAKE AN OFFICE APPOINTMENT BEFORE ANY FURTHER REFILLS ARE GIVEN.

## 2018-10-06 DIAGNOSIS — E78.00 PURE HYPERCHOLESTEROLEMIA: ICD-10-CM

## 2018-10-06 DIAGNOSIS — I10 ESSENTIAL HYPERTENSION, BENIGN: Chronic | ICD-10-CM

## 2018-10-08 RX ORDER — ATORVASTATIN CALCIUM 40 MG/1
TABLET, FILM COATED ORAL
Qty: 30 TAB | Refills: 4 | OUTPATIENT
Start: 2018-10-08

## 2018-10-08 NOTE — TELEPHONE ENCOUNTER
Requested Prescriptions     Refused Prescriptions Disp Refills    atorvastatin (LIPITOR) 40 mg tablet [Pharmacy Med Name: ATORVASTATIN 40 MG TABLET] 30 Tab 4     Sig: TAKE ONE TABLET BY MOUTH DAILY     Refused By: Graciela Yap     Reason for Refusal: Appt required, please call patient           No future appointments.

## 2019-03-20 ENCOUNTER — OFFICE VISIT (OUTPATIENT)
Dept: INTERNAL MEDICINE CLINIC | Age: 64
End: 2019-03-20

## 2019-03-20 VITALS
BODY MASS INDEX: 22.81 KG/M2 | HEART RATE: 71 BPM | SYSTOLIC BLOOD PRESSURE: 93 MMHG | TEMPERATURE: 97.4 F | WEIGHT: 101.4 LBS | DIASTOLIC BLOOD PRESSURE: 70 MMHG | RESPIRATION RATE: 16 BRPM | HEIGHT: 56 IN | OXYGEN SATURATION: 96 %

## 2019-03-20 DIAGNOSIS — R04.0 EPISTAXIS: Primary | ICD-10-CM

## 2019-03-20 DIAGNOSIS — H02.403 PTOSIS OF BOTH EYELIDS: ICD-10-CM

## 2019-03-20 NOTE — PROGRESS NOTES
Subjective:     Chief Complaint   Patient presents with    Epistaxis       History of Present Illness    Celestina Lou is a 70y.o. year old female who is a patient of Dr. Mick Saleem that presents today with a family member for complaints of epistaxis. Onset 1 week ago. She has a hx of the same. In 2017 she was having chronic epistaxis and was seen by ENT. She initially had her nose packed and ended up having it cauterized. She was on Coumadin at the time, which was switched to Xarelto. She has not had a problem since. No active bleeding at this time. Dried blood in nares. Patient states she is coughing up the blood. The patient has a hx of ETOH abuse and tobacco use. She states her last drink was Friday. She denies any other new complaints at this time. NAD. No CP, SOB, GI, or  symptoms. Reviewed medications, recent lab work and imaging with patient. Pt reports compliance with medications. Current Outpatient Medications on File Prior to Visit   Medication Sig Dispense Refill    rivaroxaban (XARELTO) 15 mg tab tablet Take  by mouth daily.  amLODIPine (NORVASC) 5 mg tablet TAKE ONE TABLET BY MOUTH DAILY FOR BLOOD PRESSURE 30 Tab 0    CITRUS CALCIUM tablet TAKE ONE TABLET BY MOUTH DAILY WITH BREAKFAST 30 Tab 11    metoprolol tartrate (LOPRESSOR) 50 mg tablet TAKE ONE TABLET BY MOUTH THREE TIMES A DAY 90 Tab 5    atorvastatin (LIPITOR) 40 mg tablet Take 1 Tab by mouth daily. 30 Tab 5    lisinopril (PRINIVIL, ZESTRIL) 40 mg tablet Take 1 Tab by mouth daily. 90 Tab 3    hfpzonh-pairekpkg-fclyvnwasced (MIDRIN) -325 mg capsule Take 1 Cap by mouth.  lansoprazole (PREVACID) 30 mg capsule Take 1 Cap by mouth Daily (before breakfast). Indications: HEARTBURN 30 Cap 5    zolpidem (AMBIEN) 5 mg tablet Take 5 mg by mouth nightly as needed for Sleep.  aspirin 81 mg tablet Take 81 mg by mouth daily. No current facility-administered medications on file prior to visit. Allergies   Allergen Reactions    Amoxicillin Diarrhea     1 day after Doxycycline    Doxycycline Diarrhea     100mg single dose \"too strong\" and caused diarrhea      Past Medical History:   Diagnosis Date    Alcohol abuse     2/25/16 phone note    Anasarca 10/18/13    per CT. Dr. Ed Rasmussen.  Atrial myxoma 06/06/14    Dr. Jeanie Jerome.  CAD (coronary artery disease)     Diastolic heart failure ([address] 10/15/13    Dr. Elana Thapa.  Diverticulosis 12/27/11    Dr. Seth Pittman.  DJD (degenerative joint disease) of knee     Dr. Reyna Nissen Elevated cholesterol     Elevated liver enzymes 09/2013    AST, ALT, Alk P    Heartburn     Hepatic steatosis 10/18/13    Hypertension     Low sodium 06/06/14    ad[facility] to [facility]    Migraine     MVP (mitral valve prolapse)     w/MR  Dr. Manish Wilson. Dr. Parul Barnes. Dr. Jeanie Jerome.  Osteopenia 03/04/08    Ovarian cyst, bilateral     PAF (paroxysmal atrial fibrillation) ([facility] 03/21/11    [facility]. Dr. Deepak Kidd. Dr. Parul Barnes. Dr. Jean Paul Wheeelr Severe mitral regurgitation by prior echocardiogram 03/21/11    Dr. Anuj Morocho. Dr. Jean Paul Wheeler Uterine fibroid       Past Surgical History:   Procedure Laterality Date    CARDIAC SURG PROCEDURE UNLIST  5/23/2011    CABG x 1 vessel, MV REPAIR. Dr. Jeanie Jerome.  COLONOSCOPY  2006, 12/27/11    Dr. Seth Pittman.   due q 5-10 yrs    HX BUNIONECTOMY  2000, 1990's    Rt 2000, Lt 1990s    HX CYST INCISION AND DRAINAGE      HX HEART CATHETERIZATION  04/12/11    Dr. Parul Barnes    HX HYSTEROSCOPY WITH ENDOMETRIAL ABLATION  11/2004    HX KNEE ARTHROSCOPY Left     HX [facility] L, [facility] R    HX MYOMECTOMY  11/2004    HX OVARIAN CYST REMOVAL Right 2004    HX WOLFGANG AND BSO  2005    HX TUBAL LIGATION        Social History     Tobacco Use    Smoking status: Former Smoker     Packs/day: 0.25     Years: 23.00     Pack years: 5.75     Types: Cigarettes     Last attempt to quit: 1/7/2016     Years since quitting: 3.2    Smokeless tobacco: Never Used    Tobacco comment: very rarely cigarettes--no cigarettes X 2 weeks   Substance Use Topics    Alcohol use: No     Alcohol/week: 0.0 oz     Comment: as of 02/21/16    Drug use: No      Family History   Problem Relation Age of Onset    Heart Surgery Mother         CABG    Hypertension Mother     High Cholesterol Mother     Cancer Father         bone    Diabetes Sister     High Cholesterol Brother     Cancer Maternal Grandmother         Objective:     Vitals:    03/20/19 1415   BP: 93/70   Pulse: 71   Resp: 16   Temp: 97.4 °F (36.3 °C)   TempSrc: Oral   SpO2: 96%   Weight: 101 lb 6.4 oz (46 kg)   Height: 4' 8\" (1.422 m)       Review of Systems   Constitutional: Negative. HENT: Negative. Nose bleed   Eyes: Negative. Respiratory: Negative. Cardiovascular: Negative. Gastrointestinal: Negative. Genitourinary: Negative. Musculoskeletal: Negative. Skin: Negative. Neurological: Negative. Psychiatric/Behavioral: Negative. Physical Exam   Constitutional: She is oriented to person, place, and time. She appears well-developed and well-nourished. Pleasant AA female. NAD   HENT:   Head: Normocephalic and atraumatic. Nose: Epistaxis (Old blood, no active bleeding) is observed. Eyes: Pupils are equal, round, and reactive to light. Conjunctivae and EOM are normal.   Neck: Normal range of motion. Neck supple. Cardiovascular: Normal rate, regular rhythm and normal heart sounds. Pulmonary/Chest: Effort normal and breath sounds normal. No respiratory distress. She has no wheezes. Abdominal: Soft. Bowel sounds are normal. She exhibits no distension. There is no tenderness. Musculoskeletal: Normal range of motion. She exhibits no edema, tenderness or deformity. Neurological: She is alert and oriented to person, place, and time. Skin: Skin is warm and dry. No rash noted. No erythema. No pallor.    Psychiatric: She has a normal mood and affect. Her behavior is normal.   Nursing note and vitals reviewed. Assessment/ Plan:   Diagnoses and all orders for this visit:    1. Epistaxis   Follow-up with ENT    2. Ptosis of both eyelids   Will order  -     REFERRAL TO OPHTHALMOLOGY    Patient's plan of care has been reviewed with them. Patient and/or family have verbally conveyed their understanding and agreement of the patient's signs, symptoms, diagnosis, treatment and prognosis and additionally agree to follow up as recommended or return to [facility] should their condition change prior to follow-up. Discharge instructions have also been provided to the patient with some educational information regarding their diagnosis as well a list of reasons why they would want to return to the office prior to their follow-up appointment should their condition change. Follow-up with Dr. Rosales Haddad in 3 months.

## 2019-03-20 NOTE — PROGRESS NOTES
[facility] 70 [facility]  Chief Complaint   Patient presents with    Epistaxis     1. Have you been to the ER, urgent care clinic since your last visit? Hospitalized since your last visit? No     2. Have you seen or consulted any other health care providers outside of the [address] [place] [address] since your last visit? Include any pap smears or colon screening.   No     Visit Vitals  BP 93/70   Pulse 71   Temp 97.4 °F (36.3 °C) (Oral)   Resp 16   Ht 4' 8\" (1.422 m)   Wt 101 lb 6.4 oz (46 kg)   SpO2 96%   BMI 22.73 kg/m²

## 2019-03-20 NOTE — PATIENT INSTRUCTIONS
Nosebleeds: Care Instructions  Your Care Instructions    Nosebleeds are common, especially if you have colds or allergies. Many things can cause a nosebleed. Some nosebleeds stop on their own with pressure. Others need packing. Some get cauterized (sealed). If you have gauze or other packing materials in your nose, you will need to follow up with your doctor to have the packing removed. You may need more treatment if you get nosebleeds a lot. The doctor has checked you carefully, but problems can develop later. If you notice any problems or new symptoms, get medical treatment right away. Follow-up care is a key part of your treatment and safety. Be sure to make and go to all appointments, and call your doctor if you are having problems. It's also a good idea to know your test results and keep a list of the medicines you take. How can you care for yourself at home? · If you get another nosebleed:  ? Sit up and tilt your head slightly forward. This keeps blood from going down your throat. ? Use your thumb and index finger to pinch your nose shut for 10 minutes. Use a clock. Do not check to see if the bleeding has stopped before the 10 minutes are up. If the bleeding has not stopped, pinch your nose shut for another 10 minutes. ? When the bleeding has stopped, try not to pick, rub, or blow your nose for 12 hours. Avoiding these things helps keep your nose from bleeding again. · If your doctor prescribed antibiotics, take them as directed. Do not stop taking them just because you feel better. You need to take the full course of antibiotics. To prevent nosebleeds  · Do not blow your nose too hard. · Try not to lift or strain after a nosebleed. · Raise your head on a pillow while you sleep. · Put a thin layer of a saline- or water-based nasal gel, such as NasoGel, inside your nose. Put it on the septum, which divides your nostrils. This will prevent dryness that can cause nosebleeds.   · Use a vaporizer or humidifier to add moisture to your bedroom. Follow the directions for cleaning the machine. · Do not use aspirin, ibuprofen (Advil, Motrin), or naproxen (Aleve) for 36 to 48 hours after a nosebleed unless your doctor tells you to. You can use acetaminophen (Tylenol) for pain relief. · Talk to your doctor about stopping any other medicines you are taking. Some medicines may make you more likely to get a nosebleed. · Do not use cold medicines or nasal sprays without first talking to your doctor. They can make your nose dry. When should you call for help? Call 911 anytime you think you may need emergency care. For example, call if:    · You passed out (lost consciousness).    Call your doctor now or seek immediate medical care if:    · You get another nosebleed and your nose is still bleeding after you have applied pressure 3 times for 10 minutes each time (30 minutes total).     · There is a lot of blood running down the back of your throat even after you pinch your nose and tilt your head forward.     · You have a fever.     · You have sinus pain.    Watch closely for changes in your health, and be sure to contact your doctor if:    · You get nosebleeds often, even if they stop.     · You do not get better as expected. Where can you learn more? Go to http://payam-laurence.info/. Enter S156 in the search box to learn more about \"Nosebleeds: Care Instructions. \"  Current as of: September 23, 2018  Content Version: 11.9  © 5868-7925 [employer]. Care instructions adapted under license by [employer] (which disclaims liability or warranty for this information). If you have questions about a medical condition or this instruction, always ask your healthcare professional. [address] any warranty or liability for your use of this information.

## 2019-03-21 ENCOUNTER — TELEPHONE (OUTPATIENT)
Dept: INTERNAL MEDICINE CLINIC | Age: 64
End: 2019-03-21

## 2019-03-21 NOTE — TELEPHONE ENCOUNTER
Call placed to pt and she was originally told that she needed an insurance referral for ENT but then office staff advised not needed

## 2019-06-10 ENCOUNTER — PATIENT OUTREACH (OUTPATIENT)
Dept: INTERNAL MEDICINE CLINIC | Age: 64
End: 2019-06-10

## 2019-06-10 NOTE — Clinical Note
Spoke to Alonzo Lopez in your office she is setting patient up with an appointment. I have a copy of chart from [facility] EMERGENCY [facility]. Would you like me to fax it to your office?   Thanks [facility]

## 2019-06-10 NOTE — PROGRESS NOTES
Hospital Discharge Follow-Up      Date/Time:  6/10/2019 4:05 PM    Patient was admitted to [facility] on 19 and discharged on 19 for hyponatremia and CHF. Top Challenges reviewed with the provider   Patient received Tolvapten for low Na  R heart cath  Severe MS, RCA lesion RCA graft occluded  Outpt CTA to assess MV area to determine if transcutaneous MVR possible or if redo of surgery possible ( scheduled for 19)          Method of communication with provider :staff message    Nurse Navigator (NN) contacted the patient by telephone to perform post hospital discharge assessment. Verified name and  with patient as identifiers. Provided introduction to self, and explanation of the Nurse Navigator role. Reviewed discharge instructions and red flags with patient who verbalized understanding. Patient given an opportunity to ask questions and does not have any further questions or concerns at this time. The patient agrees to contact the PCP office for questions related to their healthcare. NN provided contact information for future reference. Disease Specific:   CHF    Summary of patient's top problems:  1. Hyponatremia:  Now improved. At d/c Na was 132  Patient had a Nephrology consult and was given Tolvapten. 2. CHF:  Right side cath done. Patient is going to get a  CTA to assess MV area to determine if transcutaneous MVR possible or if redo of surgery possible. Per patient she is doing well voicing no complaints. Patient is going for her CT tomorrow. Home Health orders at discharge: none    Barriers to care? none    Advance Care Planning:   Does patient have an Advance Directive:  not on file     Medication(s):   New Medications at Discharge: none  Changed Medications at Discharge:none  Discontinued Medications at Discharge: ASA  Norvasc    Medication reconciliation was performed with patient, who verbalizes understanding of administration of home medications. There were no barriers to obtaining medications identified at this time. Referral to Pharm D needed: no     Current Outpatient Medications   Medication Sig    rivaroxaban (XARELTO) 15 mg tab tablet Take  by mouth daily.  CITRUS CALCIUM tablet TAKE ONE TABLET BY MOUTH DAILY WITH BREAKFAST    metoprolol tartrate (LOPRESSOR) 50 mg tablet TAKE ONE TABLET BY MOUTH THREE TIMES A DAY    atorvastatin (LIPITOR) 40 mg tablet Take 1 Tab by mouth daily.  lisinopril (PRINIVIL, ZESTRIL) 40 mg tablet Take 1 Tab by mouth daily.  lhqgpby-eoewoguwp-vtwnxbtryhla (MIDRIN) -325 mg capsule Take 1 Cap by mouth.  lansoprazole (PREVACID) 30 mg capsule Take 1 Cap by mouth Daily (before breakfast). Indications: HEARTBURN    zolpidem (AMBIEN) 5 mg tablet Take 5 mg by mouth nightly as needed for Sleep.  amLODIPine (NORVASC) 5 mg tablet TAKE ONE TABLET BY MOUTH DAILY FOR BLOOD PRESSURE    aspirin 81 mg tablet Take 81 mg by mouth daily. No current facility-administered medications for this visit. There are no discontinued medications. [facility] follow up appointment(s): No future appointments. Non-[facility] follow up appointment(s):   [facility]:  [address] [place] [address] Attends follow-up appointments as directed. 6/10/19  Patient needs follow up with PCP. Called PCP's office spoke to   [facility]. [facility] will call and schedule an appointment. Patient will schedule with Dr Twyla Juarez (card)       Understands and adheres to diet. 6/10/19 Spoke to patient about low Na diet and foods to avoid.

## 2019-06-19 ENCOUNTER — OFFICE VISIT (OUTPATIENT)
Dept: INTERNAL MEDICINE CLINIC | Age: 64
End: 2019-06-19

## 2019-06-19 VITALS
WEIGHT: 101.2 LBS | SYSTOLIC BLOOD PRESSURE: 118 MMHG | TEMPERATURE: 98.4 F | OXYGEN SATURATION: 95 % | HEART RATE: 79 BPM | HEIGHT: 56 IN | DIASTOLIC BLOOD PRESSURE: 80 MMHG | RESPIRATION RATE: 14 BRPM | BODY MASS INDEX: 22.76 KG/M2

## 2019-06-19 DIAGNOSIS — E78.5 HYPERLIPIDEMIA, UNSPECIFIED HYPERLIPIDEMIA TYPE: ICD-10-CM

## 2019-06-19 DIAGNOSIS — I50.9 CONGESTIVE HEART FAILURE, UNSPECIFIED HF CHRONICITY, UNSPECIFIED HEART FAILURE TYPE (HCC): ICD-10-CM

## 2019-06-19 DIAGNOSIS — I48.91 ATRIAL FIBRILLATION, UNSPECIFIED TYPE (HCC): ICD-10-CM

## 2019-06-19 DIAGNOSIS — I05.0 MITRAL VALVE STENOSIS, UNSPECIFIED ETIOLOGY: ICD-10-CM

## 2019-06-19 DIAGNOSIS — E87.1 HYPONATREMIA: Primary | ICD-10-CM

## 2019-06-19 NOTE — PROGRESS NOTES
Health Maintenance Due   Topic Date Due    Shingrix Vaccine Age 49> (1 of 2) 12/19/2003    COLONOSCOPY  12/27/2016    BREAST CANCER SCRN MAMMOGRAM  11/27/2018    GLAUCOMA SCREENING Q2Y  12/19/2018    Pneumococcal 65+ years (1 of 2 - PCV13) 12/19/2018    MEDICARE YEARLY EXAM  03/20/2019       Chief Complaint   Patient presents with   [facility] Follow Up     admitted to [address] on 5/27/19 d/c 6/5/19  for hyponatremia and HF       1. Have you been to the ER, urgent care clinic since your last visit? Hospitalized since your last visit?admitted to [address] on 5/27/19 d/c 6/5/19  for hyponatremia and HF    2. Have you seen or consulted any other health care providers outside of the [address] [place] [address] since your last visit? Include any pap smears or colon screening. No    3) Do you have an Advance Directive on file? no    4) Are you interested in receiving information on Advance Directives? NO      Patient is accompanied by self I have received verbal consent from Garrett Hollingsworth to discuss any/all medical information while they are present in the room.

## 2019-06-19 NOTE — PROGRESS NOTES
HISTORY OF PRESENT ILLNESS  Jaime Valentin is a 70 y.o. female. This is a patient of Dr. Brittney Mcclelland who presents today for follow-up after hospitalization at [facility] 05/27/19-06/05/19 related to CHF and hyponatremia. The patient states she has follow-up with cardiology, Dr. Mir Bertrand, scheduled for next week. She says that she is generally feeling well at the time of today's visit. No chest pain, shortness of breath, swelling, or palpitations. Visit Vitals  /80 (BP 1 Location: Left arm, BP Patient Position: Sitting)   Pulse 79   Temp 98.4 °F (36.9 °C) (Oral)   Resp 14   Ht 4' 8\" (1.422 m)   Wt 101 lb 3.2 oz (45.9 kg)   SpO2 95%   BMI 22.69 kg/m²     HPI    Review of Systems   Constitutional: Negative for chills, fever and malaise/fatigue. HENT: Negative for congestion. Eyes: Negative for blurred vision. Respiratory: Negative for cough, shortness of breath and wheezing. Cardiovascular: Negative for chest pain, palpitations and leg swelling. Gastrointestinal: Negative for abdominal pain. Genitourinary: Negative. Musculoskeletal: Negative. Skin: Negative. Neurological: Negative for dizziness, weakness and headaches. Endo/Heme/Allergies: Negative. Psychiatric/Behavioral: Negative. Physical Exam   Constitutional: She is oriented to person, place, and time. She appears well-developed and well-nourished. No distress. HENT:   Head: Normocephalic and atraumatic. Eyes: Conjunctivae are normal.   Neck: Neck supple. Cardiovascular: Normal rate and intact distal pulses. Pulmonary/Chest: Effort normal and breath sounds normal. No respiratory distress. She has no wheezes. She has no rales. Abdominal: Soft. Bowel sounds are normal. She exhibits no distension. There is no tenderness. Musculoskeletal: She exhibits no edema. Neurological: She is alert and oriented to person, place, and time. Skin: Skin is warm and dry. Psychiatric: She has a normal mood and affect. Her behavior is normal.       ASSESSMENT and PLAN  Encounter Diagnoses   Name Primary?  Hyponatremia Yes    Congestive heart failure, unspecified HF chronicity, unspecified heart failure type (HCC)     Atrial fibrillation, unspecified type ([address]     Mitral valve stenosis, unspecified etiology     Hyperlipidemia, unspecified hyperlipidemia type      Orders Placed This Encounter    CBC WITH AUTOMATED DIFF    METABOLIC PANEL, COMPREHENSIVE     Follow-up with cardiology, as scheduled. Lab results and schedule of future lab studies reviewed with patient  Reviewed medications and side effects in detail    Patient encouraged to call or return to office if symptoms do not improve or worsen. Reviewed plan of care with patient who acknowledges understanding and agrees. Follow-up with Dr. Carlie Wiley in one month, or sooner as needed.

## 2019-06-20 LAB
ALBUMIN SERPL-MCNC: 4 G/DL (ref 3.6–4.8)
ALBUMIN/GLOB SERPL: 1.2 {RATIO} (ref 1.2–2.2)
ALP SERPL-CCNC: 198 IU/L (ref 39–117)
ALT SERPL-CCNC: 10 IU/L (ref 0–32)
AST SERPL-CCNC: 24 IU/L (ref 0–40)
BASOPHILS # BLD AUTO: 0.1 X10E3/UL (ref 0–0.2)
BASOPHILS NFR BLD AUTO: 1 %
BILIRUB SERPL-MCNC: 1 MG/DL (ref 0–1.2)
BUN SERPL-MCNC: 7 MG/DL (ref 8–27)
BUN/CREAT SERPL: 8 (ref 12–28)
CALCIUM SERPL-MCNC: 8.8 MG/DL (ref 8.7–10.3)
CHLORIDE SERPL-SCNC: 97 MMOL/L (ref 96–106)
CO2 SERPL-SCNC: 20 MMOL/L (ref 20–29)
CREAT SERPL-MCNC: 0.89 MG/DL (ref 0.57–1)
EOSINOPHIL # BLD AUTO: 0.1 X10E3/UL (ref 0–0.4)
EOSINOPHIL NFR BLD AUTO: 2 %
ERYTHROCYTE [DISTWIDTH] IN BLOOD BY AUTOMATED COUNT: 20.8 % (ref 12.3–15.4)
GLOBULIN SER CALC-MCNC: 3.3 G/DL (ref 1.5–4.5)
GLUCOSE SERPL-MCNC: 67 MG/DL (ref 65–99)
HCT VFR BLD AUTO: 34.2 % (ref 34–46.6)
HGB BLD-MCNC: 11.1 G/DL (ref 11.1–15.9)
IMM GRANULOCYTES # BLD AUTO: 0 X10E3/UL (ref 0–0.1)
IMM GRANULOCYTES NFR BLD AUTO: 0 %
LYMPHOCYTES # BLD AUTO: 1.4 X10E3/UL (ref 0.7–3.1)
LYMPHOCYTES NFR BLD AUTO: 27 %
MCH RBC QN AUTO: 28.4 PG (ref 26.6–33)
MCHC RBC AUTO-ENTMCNC: 32.5 G/DL (ref 31.5–35.7)
MCV RBC AUTO: 88 FL (ref 79–97)
MONOCYTES # BLD AUTO: 0.4 X10E3/UL (ref 0.1–0.9)
MONOCYTES NFR BLD AUTO: 7 %
NEUTROPHILS # BLD AUTO: 3.2 X10E3/UL (ref 1.4–7)
NEUTROPHILS NFR BLD AUTO: 63 %
PLATELET # BLD AUTO: 306 X10E3/UL (ref 150–450)
POTASSIUM SERPL-SCNC: 4.4 MMOL/L (ref 3.5–5.2)
PROT SERPL-MCNC: 7.3 G/DL (ref 6–8.5)
RBC # BLD AUTO: 3.91 X10E6/UL (ref 3.77–5.28)
SODIUM SERPL-SCNC: 131 MMOL/L (ref 134–144)
WBC # BLD AUTO: 5 X10E3/UL (ref 3.4–10.8)

## 2019-06-27 ENCOUNTER — PATIENT OUTREACH (OUTPATIENT)
Dept: INTERNAL MEDICINE CLINIC | Age: 64
End: 2019-06-27

## 2019-07-18 ENCOUNTER — PATIENT OUTREACH (OUTPATIENT)
Dept: INTERNAL MEDICINE CLINIC | Age: 64
End: 2019-07-18

## 2019-07-18 NOTE — PROGRESS NOTES
Patient has graduated from the Transitions of Care Coordination  program on 7/18/19. Patient's symptoms are stable at this time. Patient/family has the ability to self-manage. Care management goals have been completed at this time. No further nurse navigator follow up scheduled. Goals Addressed                 This Visit's Progress     COMPLETED: Attends follow-up appointments as directed. 6/10/19  Patient needs follow up with PCP. Called PCP's office spoke to   [facility]. [facility] will call and schedule an appointment. Patient will schedule with Dr Reza Cabrera (card)       COMPLETED: Understands and adheres to diet. 6/10/19 Spoke to patient about low Na diet and foods to avoid. Pt has nurse navigator's contact information for any further questions, concerns, or needs.   Patients upcoming visits:    Future Appointments   Date Time Provider Muriel Paulino   7/24/2019  2:30 PM Jamal Savage MD [facility]   8/7/2019  2:30 PM Aurea Negrete MD [facility] [address]

## 2019-07-24 ENCOUNTER — OFFICE VISIT (OUTPATIENT)
Dept: INTERNAL MEDICINE CLINIC | Age: 64
End: 2019-07-24

## 2019-07-24 VITALS
HEART RATE: 68 BPM | HEIGHT: 56 IN | BODY MASS INDEX: 22.72 KG/M2 | WEIGHT: 101 LBS | TEMPERATURE: 97.6 F | DIASTOLIC BLOOD PRESSURE: 62 MMHG | SYSTOLIC BLOOD PRESSURE: 116 MMHG | RESPIRATION RATE: 16 BRPM | OXYGEN SATURATION: 98 %

## 2019-07-24 DIAGNOSIS — Z71.89 ACP (ADVANCE CARE PLANNING): ICD-10-CM

## 2019-07-24 DIAGNOSIS — I25.10 CORONARY ARTERY DISEASE INVOLVING NATIVE CORONARY ARTERY OF NATIVE HEART WITHOUT ANGINA PECTORIS: ICD-10-CM

## 2019-07-24 DIAGNOSIS — I50.32 CHRONIC DIASTOLIC CONGESTIVE HEART FAILURE (HCC): ICD-10-CM

## 2019-07-24 DIAGNOSIS — M85.89 OSTEOPENIA OF MULTIPLE SITES: ICD-10-CM

## 2019-07-24 DIAGNOSIS — Z12.11 SCREENING FOR COLON CANCER: ICD-10-CM

## 2019-07-24 DIAGNOSIS — I10 ESSENTIAL HYPERTENSION, BENIGN: Primary | ICD-10-CM

## 2019-07-24 DIAGNOSIS — I48.91 ATRIAL FIBRILLATION, UNSPECIFIED TYPE (HCC): ICD-10-CM

## 2019-07-24 DIAGNOSIS — Z78.0 MENOPAUSE: ICD-10-CM

## 2019-07-24 DIAGNOSIS — Z00.00 MEDICARE ANNUAL WELLNESS VISIT, INITIAL: ICD-10-CM

## 2019-07-24 DIAGNOSIS — Z12.39 SCREENING BREAST EXAMINATION: ICD-10-CM

## 2019-07-24 DIAGNOSIS — E55.9 VITAMIN D DEFICIENCY: ICD-10-CM

## 2019-07-24 RX ORDER — TORSEMIDE 10 MG/1
TABLET ORAL DAILY
COMMUNITY
End: 2019-11-13 | Stop reason: ALTCHOICE

## 2019-07-24 RX ORDER — FERROUS SULFATE, DRIED 160(50) MG
1 TABLET, EXTENDED RELEASE ORAL 2 TIMES DAILY WITH MEALS
Qty: 60 TAB | Refills: 11 | Status: SHIPPED | OUTPATIENT
Start: 2019-07-24 | End: 2020-10-05

## 2019-07-24 RX ORDER — AMLODIPINE BESYLATE 5 MG/1
5 TABLET ORAL DAILY
COMMUNITY
End: 2019-11-13 | Stop reason: ALTCHOICE

## 2019-07-24 NOTE — PROGRESS NOTES
This is an Initial Medicare Annual Wellness Exam (AWV) (Performed 12 months after IPPE or effective date of Medicare Part B enrollment, Once in a lifetime)    I have reviewed the patient's medical history in detail and updated the computerized patient record. History     Past Medical History:   Diagnosis Date    Alcohol abuse     2/25/16 phone note    Anasarca 10/18/13    per CT. Dr. Bladimir Stephen.  Atrial myxoma 06/06/14    Dr. Corrine Shelton.  CAD (coronary artery disease)     Diastolic heart failure ([facility] [address] 10/15/13    Dr. Jose Cabral.  Diverticulosis 12/27/11    Dr. Lizzy Andersen.  DJD (degenerative joint disease) of knee     Dr. Olivares [facility] Elevated cholesterol     Elevated liver enzymes 09/2013    AST, ALT, Alk P    Heartburn     Hepatic steatosis 10/18/13    Hypertension     Low sodium 06/06/14    admitted to [facility]    Migraine     MVP (mitral valve prolapse)     w/MR  Dr. Kelly Francis. Dr. Vielka Lakhani. Dr. Corrine Shelton.  Osteopenia 03/04/08    Ovarian cyst, bilateral     PAF (paroxysmal atrial fibrillation) ([facility] [address] 03/21/11    New. Dr. Bree Estrada. Dr. Vielka Lakhani. Dr. Zahraa Martinez Severe mitral regurgitation by prior echocardiogram 03/21/11    Dr. Adilson Bello. Dr. Zahraa Martinez Uterine fibroid       Past Surgical History:   Procedure Laterality Date    CARDIAC SURG PROCEDURE UNLIST  5/23/2011    CABG x 1 vessel, MV REPAIR. Dr. Corrine Shelton.  COLONOSCOPY  2006, 12/27/11    Dr. Lizzy Andersen.   due q 5-10 yrs    HX BUNIONECTOMY  2000, 1990's    Rt 2000, Lt 1990s    HX CYST INCISION AND DRAINAGE      HX HEART CATHETERIZATION  04/12/11    Dr. Vielka Lakhani    HX HYSTEROSCOPY WITH ENDOMETRIAL ABLATION  11/2004    HX KNEE ARTHROSCOPY Left     HX MOHS PROCEDURES  1998 L, 2001 R    HX MYOMECTOMY  11/2004    HX OVARIAN CYST REMOVAL Right 2004    HX WOLFGANG AND BSO  2005    HX TUBAL LIGATION       Current Outpatient Medications   Medication Sig Dispense Refill    torsemide (DEMADEX) 10 mg tablet Take  by mouth daily.  rivaroxaban (XARELTO) 15 mg tab tablet Take  by mouth daily.  CITRUS CALCIUM tablet TAKE ONE TABLET BY MOUTH DAILY WITH BREAKFAST 30 Tab 11    metoprolol tartrate (LOPRESSOR) 50 mg tablet TAKE ONE TABLET BY MOUTH THREE TIMES A DAY 90 Tab 5    atorvastatin (LIPITOR) 40 mg tablet Take 1 Tab by mouth daily. 30 Tab 5    lansoprazole (PREVACID) 30 mg capsule Take 1 Cap by mouth Daily (before breakfast).  Indications: HEARTBURN 30 Cap 5     Allergies   Allergen Reactions    Amoxicillin Diarrhea     1 day after Doxycycline    Doxycycline Diarrhea     100mg single dose \"too strong\" and caused diarrhea     Family History   Problem Relation Age of Onset    Heart Surgery Mother         CABG    Hypertension Mother     High Cholesterol Mother     Cancer Father         bone    Diabetes Sister     High Cholesterol Brother     Cancer Maternal Grandmother      Social History     Tobacco Use    Smoking status: Former Smoker     Packs/day: 0.25     Years: 23.00     Pack years: 5.75     Types: Cigarettes     Last attempt to quit: 1/7/2016     Years since quitting: 3.5    Smokeless tobacco: Never Used    Tobacco comment: very rarely cigarettes--no cigarettes X 2 weeks   Substance Use Topics    Alcohol use: No     Alcohol/week: 0.0 standard drinks     Comment: as of 02/21/16     Patient Active Problem List   Diagnosis Code    Pure hypercholesterolemia E78.00    Essential hypertension, benign I10    Hematuria, unspecified R31.9    Diffuse cystic mastopathy N60.19    Osteopenia M85.80    MVP (mitral valve prolapse) I34.1    Mitral regurgitation I34.0    CAD (coronary artery disease), native coronary artery I25.10    Back pain, thoracic M54.6    Hyponatremia G11.8    Diastolic heart failure (HCC) I50.30    Cardiomyopathy (HCC) I42.9    Elevated liver enzymes R74.8    Atrial myxoma D15.1    Elevated brain natriuretic peptide (BNP) level R79.89    Alcohol abuse F10.10    Pneumonia J18.9    Paroxysmal atrial fibrillation (HCC) I48.0       Depression Risk Factor Screening:     3 most recent PHQ Screens 7/24/2019   Little interest or pleasure in doing things Not at all   Feeling down, depressed, irritable, or hopeless Not at all   Total Score PHQ 2 0     Alcohol Risk Factor Screening: You do not drink alcohol or very rarely. Functional Ability and Level of Safety:     Hearing Loss  Hearing is good. Activities of Daily Living  The home contains: no safety equipment. Patient does total self care    Fall Risk  Fall Risk Assessment, last 12 mths 7/24/2019   Able to walk? Yes   Fall in past 12 months?  No       Abuse Screen  Patient is not abused    Cognitive Screening   Evaluation of Cognitive Function:  Has your family/caregiver stated any concerns about your memory: no  Normal    Patient Care Team   Patient Care Team:  Imtiaz Villatoro MD as PCP - General (Internal Medicine)  Birt Goldberg, MD as Referring Provider (Cardiology)  Jarrdo Murguia MD (Radiology)  Saqib Mitchell MD (Colon and Rectal Surgery)  Marlee Lopez MD (Nephrology)    Assessment/Plan   Education and counseling provided:  Are appropriate based on today's review and evaluation  End-of-Life planning (with patient's consent)  Pneumococcal Vaccine  Screening Mammography  Colorectal cancer screening tests  Cardiovascular screening blood test         Health Maintenance Due   Topic Date Due    Shingrix Vaccine Age 50> (1 of 2) 12/19/2003    COLONOSCOPY  12/27/2016    BREAST CANCER SCRN MAMMOGRAM  11/27/2018    GLAUCOMA SCREENING Q2Y  12/19/2018

## 2019-07-24 NOTE — PATIENT INSTRUCTIONS
Medicare Wellness Visit, Female     The best way to live healthy is to have a lifestyle where you eat a well-balanced diet, exercise regularly, limit alcohol use, and quit all forms of tobacco/nicotine, if applicable. Regular preventive services are another way to keep healthy. Preventive services (vaccines, screening tests, monitoring & exams) can help personalize your care plan, which helps you manage your own care. Screening tests can find health problems at the earliest stages, when they are easiest to treat. [facility] follows the current, evidence-based guidelines published by the [facility] ([facility]) when recommending preventive services for our patients. Because we follow these guidelines, sometimes recommendations change over time as research supports it. (For example, mammograms used to be recommended annually. Even though Medicare will still pay for an annual mammogram, the newer guidelines recommend a mammogram every two years for women of average risk.)  Of course, you and your doctor may decide to screen more often for some diseases, based on your risk and your health status. Preventive services for you include:  - Medicare offers their members a free annual wellness visit, which is time for you and your primary care provider to discuss and plan for your preventive service needs. Take advantage of this benefit every year!  -All adults over the age of 72 should receive the recommended pneumonia vaccines. Current USPSTF guidelines recommend a series of two vaccines for the best pneumonia protection.   -All adults should have a flu vaccine yearly and a tetanus vaccine every 10 years. All adults age 61 and older should receive a shingles vaccine once in their lifetime.    -A bone mass density test is recommended when a woman turns 65 to screen for osteoporosis. This test is only recommended one time, as a screening.  Some providers will use this same test as a disease monitoring tool if you already have osteoporosis. -All adults age 38-68 who are overweight should have a diabetes screening test once every three years.   -Other screening tests and preventive services for persons with diabetes include: an eye exam to screen for diabetic retinopathy, a kidney function test, a foot exam, and stricter control over your cholesterol.   -Cardiovascular screening for adults with routine risk involves an electrocardiogram (ECG) at intervals determined by your doctor.   -Colorectal cancer screenings should be done for adults age 54-65 with no increased risk factors for colorectal cancer. There are a number of acceptable methods of screening for this type of cancer. Each test has its own benefits and drawbacks. Discuss with your doctor what is most appropriate for you during your annual wellness visit. The different tests include: colonoscopy (considered the best screening method), a fecal occult blood test, a fecal DNA test, and sigmoidoscopy. -Breast cancer screenings are recommended every other year for women of normal risk, age 54-69.  -Cervical cancer screenings for women over age 72 are only recommended with certain risk factors.   -All adults born between [facility] should be screened once for Hepatitis C.      Here is a list of your current Health Maintenance items (your personalized list of preventive services) with a due date:  Health Maintenance Due   Topic Date Due    Shingles Vaccine (1 of 2) 12/19/2003    Colonoscopy  12/27/2016    Mammogram  11/27/2018    Glaucoma Screening   12/19/2018 actual

## 2019-07-24 NOTE — ACP (ADVANCE CARE PLANNING)
Advance Care Planning (ACP) Provider Conversation Snapshot    Date of ACP Conversation: 07/24/19  Persons included in Conversation:  patient  Length of ACP Conversation in minutes:  16 minutes    Authorized Decision Maker (if patient is incapable of making informed decisions):    This person is:   POA,son,luan isiah,sister,Soraya birmingham            For Patients with Decision Making Capacity:   Values/Goals: Exploration of values, goals, and preferences if recovery is not expected, even with continued medical treatment in the event of:  Imminent death    Conversation Outcomes / Follow-Up Plan:   Recommended completion of Advance Directive form after review of ACP materials and conversation with prospective healthcare agent

## 2019-07-24 NOTE — PROGRESS NOTES
HISTORY OF PRESENT ILLNESS  Edwin Olivares is a 70 y.o. female here after long time. Had history of diastolic heart failure. She was admitted to [facility] in month of May with acute heart failure. Diuresis diuresed takes. Weight came down to normal.  No shortness of breath orthopnea. Taking low-dose Demadex. Her metoprolol dose was readjusted. Will see valve  next month. Has mitral regurgitation. Has coronary artery disease, hypertension, on medicine. Doing well. No chest pain palpitation shortness of breath. Need bone density and mammogram.  Need to take calcium with vitamin D. Here for Medicare wellness visit. Has no living will. HPI    Review of Systems   Constitutional: Negative. HENT: Negative. Eyes: Negative. Respiratory: Negative. Cardiovascular: Negative. Gastrointestinal: Negative. Genitourinary: Negative. Musculoskeletal: Negative. Skin: Negative. Neurological: Negative. Endo/Heme/Allergies: Negative. Psychiatric/Behavioral: Negative. Physical Exam   Constitutional: She appears well-developed and well-nourished. No distress. Neck: Normal range of motion. Neck supple. No JVD present. No thyromegaly present. Cardiovascular: Normal rate, regular rhythm, normal heart sounds and intact distal pulses. Pulmonary/Chest: Effort normal and breath sounds normal. No respiratory distress. She has no wheezes. Abdominal: Soft. Bowel sounds are normal. She exhibits no distension. There is no tenderness. Musculoskeletal: She exhibits no edema or tenderness. Lymphadenopathy:     She has no cervical adenopathy. Psychiatric: She has a normal mood and affect. Her behavior is normal.       ASSESSMENT and PLAN  Diagnoses and all orders for this visit:    1. Essential hypertension, benign    Compensated. On amlodipine and metoprolol.   Will check,  -     METABOLIC PANEL, COMPREHENSIVE  -     LIPID PANEL    2. Medicare annual wellness visit, initial    3. ACP (advance care planning)    4. Chronic diastolic congestive heart failure (HCC)    Right now compensated. She was admitted to [address] [place] [address] in [facility] with congestive heart failure. Was on diuretics. Repeat lab work showed hyponatremia. Probably because of diuretics. Right now she is on low-dose of Demadex. Will repeat,  -     METABOLIC PANEL, COMPREHENSIVE  -     LIPID PANEL  Has valve issue, will see Dr. Romain Florian gone next month. Her cardiologist is Dr. Vidya Gentile from [address] [place] [address]. 5. Atrial fibrillation, unspecified type ([address]    On metoprolol. Rate and rhythm control. On Xarelto. Will check,  -     METABOLIC PANEL, COMPREHENSIVE  -     LIPID PANEL    6. Osteopenia of multiple sites  Will call in calcium and vitamin D. We will do a bone density. 7. Coronary artery disease involving native coronary artery of native heart without angina pectoris    On Xarelto and statin. Will check,  -     METABOLIC PANEL, COMPREHENSIVE  -     LIPID PANEL    8. Vitamin D deficiency  -     VITAMIN D, 25 HYDROXY    9. Menopause  -     DEXA BONE DENSITY STUDY AXIAL; Future    10. Screening breast examination  -     ALLY MAMMO BI SCREENING INCL CAD; Future    11. Screening for colon cancer  -     REFERRAL TO GASTROENTEROLOGY    Discussed expected course/resolution/complications of diagnosis in detail with patient. Medication risks/benefits/costs/interactions/alternatives discussed with patient. Pt was given an after visit summary which includes diagnoses, current medications & vitals. Pt expressed understanding with the diagnosis and plan.

## 2019-07-24 NOTE — PROGRESS NOTES
Goran Gonzalez is a 70 y.o. female    Chief Complaint   Patient presents with    Hypertension    Cholesterol Problem    Ankle swelling    Annual Wellness Visit     1. Have you been to the ER, urgent care clinic since your last visit? Hospitalized since your last visit? No     2. Have you seen or consulted any other health care providers outside of the [address] [place] [address] since your last visit? Include any pap smears or colon screening.   No     Visit Vitals  /62 (BP 1 Location: Left arm, BP Patient Position: Sitting)   Pulse 68   Temp 97.6 °F (36.4 °C) (Oral)   Resp 16   Ht 4' 8\" (1.422 m)   Wt 101 lb (45.8 kg)   SpO2 98%   BMI 22.64 kg/m²

## 2019-07-25 LAB
25(OH)D3+25(OH)D2 SERPL-MCNC: 28.9 NG/ML (ref 30–100)
ALBUMIN SERPL-MCNC: 4.1 G/DL (ref 3.6–4.8)
ALBUMIN/GLOB SERPL: 1.3 {RATIO} (ref 1.2–2.2)
ALP SERPL-CCNC: 205 IU/L (ref 39–117)
ALT SERPL-CCNC: 12 IU/L (ref 0–32)
AST SERPL-CCNC: 29 IU/L (ref 0–40)
BILIRUB SERPL-MCNC: 1.5 MG/DL (ref 0–1.2)
BUN SERPL-MCNC: 10 MG/DL (ref 8–27)
BUN/CREAT SERPL: 9 (ref 12–28)
CALCIUM SERPL-MCNC: 8.9 MG/DL (ref 8.7–10.3)
CHLORIDE SERPL-SCNC: 93 MMOL/L (ref 96–106)
CHOLEST SERPL-MCNC: 113 MG/DL (ref 100–199)
CO2 SERPL-SCNC: 20 MMOL/L (ref 20–29)
CREAT SERPL-MCNC: 1.13 MG/DL (ref 0.57–1)
GLOBULIN SER CALC-MCNC: 3.2 G/DL (ref 1.5–4.5)
GLUCOSE SERPL-MCNC: 57 MG/DL (ref 65–99)
HDLC SERPL-MCNC: 37 MG/DL
INTERPRETATION, 910389: NORMAL
INTERPRETATION: NORMAL
LDLC SERPL CALC-MCNC: 65 MG/DL (ref 0–99)
PDF IMAGE, 910387: NORMAL
POTASSIUM SERPL-SCNC: 4.2 MMOL/L (ref 3.5–5.2)
PROT SERPL-MCNC: 7.3 G/DL (ref 6–8.5)
SODIUM SERPL-SCNC: 130 MMOL/L (ref 134–144)
TRIGL SERPL-MCNC: 55 MG/DL (ref 0–149)
VLDLC SERPL CALC-MCNC: 11 MG/DL (ref 5–40)

## 2019-07-25 NOTE — PROGRESS NOTES
Sodium level still low. Probably Demadex is causing it. Advised to have a slightly high salt intake. Kidney functions test slightly reduced. Need to drink more fluid. Refer patient to nephrologist to discuss about low sodium and treatment. Slightly low vitamin D, advised to take vitamin D3 1000 units a day for 4 months.

## 2019-08-07 ENCOUNTER — OFFICE VISIT (OUTPATIENT)
Dept: CARDIOLOGY CLINIC | Age: 64
End: 2019-08-07

## 2019-08-07 VITALS
HEART RATE: 59 BPM | BODY MASS INDEX: 23.61 KG/M2 | WEIGHT: 105.31 LBS | RESPIRATION RATE: 16 BRPM | DIASTOLIC BLOOD PRESSURE: 54 MMHG | SYSTOLIC BLOOD PRESSURE: 98 MMHG | OXYGEN SATURATION: 100 % | TEMPERATURE: 97.3 F

## 2019-08-07 DIAGNOSIS — I50.32 CHRONIC DIASTOLIC HEART FAILURE (HCC): ICD-10-CM

## 2019-08-07 DIAGNOSIS — E87.1 HYPONATREMIA: ICD-10-CM

## 2019-08-07 DIAGNOSIS — I48.0 PAROXYSMAL ATRIAL FIBRILLATION (HCC): ICD-10-CM

## 2019-08-07 DIAGNOSIS — I34.1 MVP (MITRAL VALVE PROLAPSE): Primary | ICD-10-CM

## 2019-08-07 DIAGNOSIS — I42.9 CARDIOMYOPATHY, UNSPECIFIED TYPE (HCC): ICD-10-CM

## 2019-08-07 DIAGNOSIS — N28.9 ABNORMAL KIDNEY FUNCTION: Primary | ICD-10-CM

## 2019-08-07 DIAGNOSIS — I25.10 CORONARY ARTERY DISEASE INVOLVING NATIVE CORONARY ARTERY OF NATIVE HEART WITHOUT ANGINA PECTORIS: ICD-10-CM

## 2019-08-07 DIAGNOSIS — I10 ESSENTIAL HYPERTENSION, BENIGN: ICD-10-CM

## 2019-08-07 NOTE — PROGRESS NOTES
Called the pt and after verifying HIPAA, reviewed her lab results and provider's recommendations. The pt voiced full understanding and requested that the labs and a referral be mailed to her.

## 2019-08-07 NOTE — PROGRESS NOTES
I had the pleasure of seeing Ms. Mark Vidal in the valve clinic earlier today with Ms. Ramila Back, TSERING - please see her note for details. She has severe stenosis of her mitral valve after prior ring, and significant RV dysfunction and PH, along with torrential TR. She looks good in person but had a recent significant HF hospitalization, so I think she has a window of opportunity, and would recommend a two-valve solution of open surgery to replace the mitral and repair the tricuspid. She wanted to head to vacation and let us know her decision on her return.

## 2019-08-07 NOTE — PROGRESS NOTES
Pt seen and examined  Full note by Ale Alfaro NP reviewed and confirmed  Echo cath and CTA reviewed  Pt discussed with DR Aldana  SHe has severe MS TR and signal vessel CAD (rca)  She would be high risk for surgery but likely best option for her long term out look   Discussed risks and indications

## 2019-08-07 NOTE — PROGRESS NOTES
Patient: Jayro Calero   Age: 70 y.o. Patient Care Team:  Brett Joiner MD as PCP - General (Internal Medicine)  Gretel Avalos MD as Referring Provider (Cardiology)  Thaddeus Guardado MD (Radiology)  Danilo Enrique MD (Colon and Rectal Surgery)  Danielle Box MD (Nephrology)  Pastor Martino, RN as Ambulatory Care Navigator    PCP: Brett Joiner MD    Cardiologist: Thu Jimenez    Diagnosis/Reason for Consultation: The primary encounter diagnosis was MVP (mitral valve prolapse). Diagnoses of Chronic diastolic heart failure ([address] Cardiomyopathy, unspecified type ([address] Paroxysmal atrial fibrillation ([address] Essential hypertension, benign, and Coronary artery disease involving native coronary artery of native heart without angina pectoris were also pertinent to this visit. Problem List:   Patient Active Problem List   Diagnosis Code    Pure hypercholesterolemia E78.00    Essential hypertension, benign I10    Hematuria, unspecified R31.9    Diffuse cystic mastopathy N60.19    Osteopenia M85.80    MVP (mitral valve prolapse) I34.1    Mitral regurgitation I34.0    CAD (coronary artery disease), native coronary artery I25.10    Back pain, thoracic M54.6    Hyponatremia X52.8    Diastolic heart failure (HCC) I50.30    Cardiomyopathy (HCC) I42.9    Elevated liver enzymes R74.8    Atrial myxoma D15.1    Elevated brain natriuretic peptide (BNP) level R79.89    Alcohol abuse F10.10    Pneumonia J18.9    Paroxysmal atrial fibrillation (HCC) I48.0      HPI: 70 y.o.   female with PMHx of MS s/p MVr (Complex repair using cleft closure and 25mm ATS adjustable anuloplasty ring 5/2011), TR, Pulmonary HTN, HTN, CAD s/p CABG (SVG to RCA 2011), CM, Atrial myxoma, PAF, GIB, Gastric Bypass (12/2016), CKD s/p Kidney transplant (6/2018), Past ETOH abuse, Hyponatremia, osteopenia (spine), recurrent epistaxis, & current smoker that is referred to the [address] [place] [address] by  Brock Alvarenga for interventional evaluation of MS and TR. Ms. Rashmi Acosta had her most recent CHF admission in May of this year. She also had hyponatremia requiring tolvaptan for correction. She has not been readmitted since then nor has she had any medication changes. She did undergo a cardiac catheterization that revealed an occluded native RCA and RCA graft as well as elevated PASP 58 mmHg and severe MS. Today Ms. Rashmi Acosta reports feeling 'ok.'  She still has some fatigue but continues to walk 2 miles 2-3 times/week. She denies any SOB/ARRINGTON, CP, chest tightness, palpitations, orthopnea, PND, lightheadedness, syncope, falls or LE edema. She is accompanied by her sister today. She has an upcoming vacation and doesn't want to pursue any testing/procedures until that has concluded. NYHA Classification: II   Class II (Mild): Slight limitation of physical activity. Comfortable at rest, but ordinary physical activity results in fatigue, palpitation, or dyspnea. Angina Classification: 0   Class 0: No symptoms     Past Medical History:   Diagnosis Date    Alcohol abuse     2/25/16 phone note    Anasarca 10/18/13    per CT. Dr. Pawel Perry.  Atrial myxoma 06/06/14    Dr. Jake Storm.  CAD (coronary artery disease)     Diastolic heart failure ([facility] 10/15/13    Dr. Georgi Ryder.  Diverticulosis 12/27/11    Dr. Rishi Hairston.  DJD (degenerative joint disease) of knee     Dr. Megan Ruano Elevated cholesterol     Elevated liver enzymes 09/2013    AST, ALT, Alk P    Heartburn     Hepatic steatosis 10/18/13    Hypertension     Low sodium 06/06/14    admitted to [facility] [place]    Migraine     MVP (mitral valve prolapse)     w/MR  Dr. Brady Mitchell. Dr. Melony Nguyễn. Dr. Jake Storm.  Osteopenia 03/04/08    Ovarian cyst, bilateral     PAF (paroxysmal atrial fibrillation) ([facility] 03/21/11    [place]. Dr. Angel Mcdowell. Dr. Melony Nguyễn.   Dr. Weaver Mis Severe mitral regurgitation by prior echocardiogram 03/21/11     Adilson Bello. Dr. Zahraa Martinez Uterine fibroid        Past Surgical History:   Procedure Laterality Date    CARDIAC SURG PROCEDURE UNLIST  5/23/2011    CABG x 1 vessel, MV REPAIR. Dr. Corrine Shelton.  COLONOSCOPY  2006, 12/27/11    Dr. Lizzy Andersen. due q 5-10 yrs    HX BUNIONECTOMY  2000, 1990's    Rt 2000, Lt 1990s    HX CYST INCISION AND DRAINAGE      HX HEART CATHETERIZATION  04/12/11    Dr. Vielka Lakhani    HX HYSTEROSCOPY WITH ENDOMETRIAL ABLATION  11/2004    HX KNEE ARTHROSCOPY Left     HX MOHS PROCEDURES  1998 L, 2001 R    HX MYOMECTOMY  11/2004    HX OVARIAN CYST REMOVAL Right 2004    HX WOLFGANG AND BSO  2005    HX TUBAL LIGATION        Social History     Tobacco Use    Smoking status: Former Smoker     Packs/day: 0.25     Years: 23.00     Pack years: 5.75     Types: Cigarettes     Last attempt to quit: 1/7/2016     Years since quitting: 3.6    Smokeless tobacco: Never Used    Tobacco comment: very rarely cigarettes--no cigarettes X 2 weeks   Substance Use Topics    Alcohol use: No     Alcohol/week: 0.0 standard drinks     Comment: as of 02/21/16      Family History   Problem Relation Age of Onset    Heart Surgery Mother         CABG    Hypertension Mother     High Cholesterol Mother     Cancer Father         bone    Diabetes Sister     High Cholesterol Brother     Cancer Maternal Grandmother      Prior to Admission medications    Medication Sig Start Date End Date Taking? Authorizing Provider   torsemide (DEMADEX) 10 mg tablet Take  by mouth daily. Yes Provider, Historical   amLODIPine (NORVASC) 5 mg tablet Take 5 mg by mouth daily. Yes Provider, Historical   calcium-vitamin D (OYSTER SHELL) 500 mg(1,250mg) -200 unit per tablet Take 1 Tab by mouth two (2) times daily (with meals). 7/24/19  Yes Catia Brush MD   rivaroxaban [facility]) 15 mg tab tablet Take  by mouth daily.    Yes Provider, Historical   metoprolol tartrate (LOPRESSOR) 50 mg tablet TAKE ONE TABLET BY MOUTH THREE TIMES A DAY  Patient taking differently: Take 50 mg by mouth daily. TAKE ONE TABLET BY MOUTH THREE TIMES A DAY 11/9/17  Yes Nadine Reyes MD   atorvastatin (LIPITOR) 40 mg tablet Take 1 Tab by mouth daily. 9/12/17  Yes Nadine Reyes MD   lansoprazole (PREVACID) 30 mg capsule Take 1 Cap by mouth Daily (before breakfast). Indications: HEARTBURN 11/4/16  Yes Nadine Reyes MD   metoprolol succinate (TOPROL XL) 50 mg XL tablet Take  by mouth daily. Provider, Historical   CITRUS CALCIUM tablet TAKE ONE TABLET BY MOUTH DAILY WITH BREAKFAST 5/7/18   Morgan Simpson MD       Allergies   Allergen Reactions    Amoxicillin Diarrhea     1 day after Doxycycline    Doxycycline Diarrhea     100mg single dose \"too strong\" and caused diarrhea       Current Medications:   Current Outpatient Medications   Medication Sig Dispense Refill    torsemide (DEMADEX) 10 mg tablet Take  by mouth daily.  amLODIPine (NORVASC) 5 mg tablet Take 5 mg by mouth daily.  calcium-vitamin D (OYSTER SHELL) 500 mg(1,250mg) -200 unit per tablet Take 1 Tab by mouth two (2) times daily (with meals). 60 Tab 11    rivaroxaban (XARELTO) 15 mg tab tablet Take  by mouth daily.  metoprolol tartrate (LOPRESSOR) 50 mg tablet TAKE ONE TABLET BY MOUTH THREE TIMES A DAY (Patient taking differently: Take 50 mg by mouth daily. TAKE ONE TABLET BY MOUTH THREE TIMES A DAY) 90 Tab 5    atorvastatin (LIPITOR) 40 mg tablet Take 1 Tab by mouth daily. 30 Tab 5    lansoprazole (PREVACID) 30 mg capsule Take 1 Cap by mouth Daily (before breakfast). Indications: HEARTBURN 30 Cap 5    metoprolol succinate (TOPROL XL) 50 mg XL tablet Take  by mouth daily.  CITRUS CALCIUM tablet TAKE ONE TABLET BY MOUTH DAILY WITH BREAKFAST 30 Tab 11       Vitals: Blood pressure 98/54, pulse (!) 59, temperature 97.3 °F (36.3 °C), temperature source Oral, resp. rate 16, weight 105 lb 5 oz (47.8 kg), SpO2 100 %.        Allergies: is allergic to amoxicillin and doxycycline. Review of Systems: Pertinent Positives per HPI   [x]Total of 13 systems reviewed as follows:  Constitutional: Negative fever, negative chills  Eyes:   Negative for amauroses fugax  ENT:   Negative sore throat,oral absecess  Endocrine Negative for thyroid replacement Rx; goiter; DM  Respiratory:  Negative chronic cough,sputum production  Cards:   Negative for palpitations, lower extremity edema, varicosities, claudication  GI:   Negative for dysphagia, bleeding, nausea, vomiting, diarrhea, and abdominal pain  Genitourinary: Negative for frequency, dysuria  Integument:  Negative for rash and pruritus  Hematologic:  Negative for easy bruising; bleeding dyscarsia  Musculoskel: Negative for muscle weakness inhibiting ambulation  Neurological:  Negative for stroke, TIA, syncope, dizziness  Behavl/Psych: Negative for feelings of anxiety, depression     Cardiovascular Testing:   TTE 5/28/2019:  60-65% LVEF w/o regional wall motion abnormalites, mS with peak gradient 32 mmhg, meanPG 12, mild-mod MR, LA mildly dilated, RV dialted, RA mod dilated, severe TR, PASP 58 mmHg    RAKESH: none    Cardiac catheterization 6/4/2019: Occluded native RCA and RCA graft as well as elevated PASP 58 mmHg and severe MS    Gated C/A/P CTA 6/11/2019: Done at [facility]. Images reviewed by Bello Olivarez. No report available at this time. Physical Exam:  General: Think well groomed elderly woman appearing older than stated age accompanied by her sister  Neuro: A&OX3. MELO. PERRL. Steady un-assisted gait  Head:Normocephalic. Atraumatic. Symmetrical  Neck: Trachea Midline  Resp: CTA B. No Adv BS/cough/sputum/tachypnea with seated conversation  CV: S1S2 RRR. SNEHA III/VI. No JVD/carotid bruits. Pink/warm/dry extremities. Trace LE peripheral edema  GI:Benign ab. Soft. NT/ND. Active BS  : Voids  Integ: No obvious s/s of infection or breakdown  Musculo/Skeletal: FROM in all major joints.  Modest muscle tone    Clinic Evaluation: KCCQ-12: scanned into EMR    5 meter gait: 3 seconds    Assessment/Plan:   1.  MS s/p MVr (Complex repair using cleft closure and 25mm ATS adjustable anuloplasty ring 5/2011) - significant MS with RV dysfunction. At least intermediate surgical risk if single valve but at least high risk if to pursue tx for TV too. 2. TR - significant with pulmonary HTN. Would likely benefit from valve replacement. 3. Further plan/care by Bello Hall

## 2019-09-03 ENCOUNTER — PATIENT OUTREACH (OUTPATIENT)
Dept: INTERNAL MEDICINE CLINIC | Age: 64
End: 2019-09-03

## 2019-09-03 RX ORDER — METOPROLOL SUCCINATE 50 MG/1
TABLET, EXTENDED RELEASE ORAL DAILY
COMMUNITY
End: 2019-11-13 | Stop reason: ALTCHOICE

## 2019-09-03 NOTE — PROGRESS NOTES
Hospital Discharge Follow-Up      Date/Time:  9/3/2019 3:14 PM    Patient was admitted to [facility] on 19 and discharged on 19 for CHF. The physician discharge summary was available at the time of outreach. Patient was contacted within 1 business days of discharge. Top Challenges reviewed with the provider     Needs MV surgery seeing MD at [facility]  Low Na122  INR elevated 7.1  Xarelto was held but now resumed         Method of communication with provider :staff message    Inpatient RRAT score:   Was this a readmission? no   Patient stated reason for the readmission:     Nurse Navigator (NN) contacted the patient by telephone to perform post hospital discharge assessment. Verified name and  with patient as identifiers. Provided introduction to self, and explanation of the Nurse Navigator role. Reviewed discharge instructions and red flags with patient who verbalized understanding. Patient given an opportunity to ask questions and does not have any further questions or concerns at this time. The patient agrees to contact the PCP office for questions related to their healthcare. NN provided contact information for future reference. Disease Specific:       Summary of patient's top problems:  1. Chest pain:  Recurrent   Multifactorial in setting of CAD s/p CABG  On metoprolol and statins following up with [facility] MV surgery  2. Hyponatremia:  Na 122 on admission chronic due to CHF no neurological deficits  Fluid restriction     3.AFib:  Chronic  Rate is control on metoprolol   Xarelto is on hold for supratherapeutic INR    Spoke to patient briefly. She was doing fine waiting to go to [facility] on Thursday. Home Health orders at discharge: none    Barriers to care? none  Advance Care Planning:   Does patient have an Advance Directive:  not on file     Medication(s):   New Medications at Discharge:  To[rol XL  Changed Medications at Discharge: na  Discontinued Medications at Discharge: na    Medication reconciliation was performed with patient, who verbalizes understanding of administration of home medications. There were no barriers to obtaining medications identified at this time. Referral to Pharm D needed: no     Current Outpatient Medications   Medication Sig    metoprolol succinate (TOPROL XL) 50 mg XL tablet Take  by mouth daily.  torsemide (DEMADEX) 10 mg tablet Take  by mouth daily.  rivaroxaban (XARELTO) 15 mg tab tablet Take  by mouth daily.  atorvastatin (LIPITOR) 40 mg tablet Take 1 Tab by mouth daily.  lansoprazole (PREVACID) 30 mg capsule Take 1 Cap by mouth Daily (before breakfast). Indications: HEARTBURN    amLODIPine (NORVASC) 5 mg tablet Take 5 mg by mouth daily.  calcium-vitamin D (OYSTER SHELL) 500 mg(1,250mg) -200 unit per tablet Take 1 Tab by mouth two (2) times daily (with meals).  CITRUS CALCIUM tablet TAKE ONE TABLET BY MOUTH DAILY WITH BREAKFAST    metoprolol tartrate (LOPRESSOR) 50 mg tablet TAKE ONE TABLET BY MOUTH THREE TIMES A DAY (Patient taking differently: Take 50 mg by mouth daily. TAKE ONE TABLET BY MOUTH THREE TIMES A DAY)     No current facility-administered medications for this visit. There are no discontinued medications. [facility] follow up appointment(s): declined with [facility]  Non-[facility] follow up appointment(s): [facility] card surgery 9/5/19  [facility]:  n/a       Goals      Attends follow-up appointments as directed. 9/3/19  Patient has an appointment on Thursday at [facility] concerning cardiac surgery.   Explained to patient she needs to follow up with Dr Kim Marrero for her INR

## 2019-09-06 ENCOUNTER — TELEPHONE (OUTPATIENT)
Dept: INTERNAL MEDICINE CLINIC | Age: 64
End: 2019-09-06

## 2019-09-06 DIAGNOSIS — R74.8 ELEVATED ALKALINE PHOSPHATASE LEVEL: Primary | ICD-10-CM

## 2019-09-06 DIAGNOSIS — R17 ELEVATED BILIRUBIN: ICD-10-CM

## 2019-09-06 DIAGNOSIS — Z01.818 PRE-OP TESTING: ICD-10-CM

## 2019-09-06 DIAGNOSIS — Z01.818 PRE-OP EXAM: ICD-10-CM

## 2019-09-06 NOTE — TELEPHONE ENCOUNTER
Parul Duong calling to speak with a Nurse re: pt's medical history.  (no other information was given)

## 2019-09-06 NOTE — TELEPHONE ENCOUNTER
Returned Lenin Garcia and after verifying HIPAA Ed Ordaz stated that the pt is scheduled to undergo mitral valve repair surgery and the surgeon would like some additional testing to be ordered by Dr. Geraldine Unger. Asked that Ed Ordaz send a request for this via fax so that Dr. Geraldine Unger can review and order testing appropriately. Provided Ed Ordaz with the fax number.

## 2019-09-09 NOTE — TELEPHONE ENCOUNTER
After conferring with Dr. Bertrand Hart received a verbal order, read back for confirmation that the pt may have a liver ultrasound for her pre-op testing.

## 2019-09-09 NOTE — TELEPHONE ENCOUNTER
Called the pt and after verifying HIPAA, advised her of the need for the liver US. An order was placed and the pt was provided with the phone number to call and schedule her ultrasound. The pt voiced thanks and understanding.

## 2019-09-17 ENCOUNTER — HOSPITAL ENCOUNTER (OUTPATIENT)
Dept: ULTRASOUND IMAGING | Age: 64
Discharge: HOME | End: 2019-09-17
Attending: INTERNAL MEDICINE
Payer: MEDICARE

## 2019-09-17 DIAGNOSIS — R74.8 ELEVATED ALKALINE PHOSPHATASE LEVEL: ICD-10-CM

## 2019-09-17 DIAGNOSIS — Z01.818 PRE-OP TESTING: ICD-10-CM

## 2019-09-17 DIAGNOSIS — R17 ELEVATED BILIRUBIN: ICD-10-CM

## 2019-09-17 PROCEDURE — 76705 ECHO EXAM OF ABDOMEN: CPT

## 2019-09-17 NOTE — PROGRESS NOTES
Ascites, probably because of heart failure. Will refer patient to cardiologist.  Nonspecific gallbladder wall thickening. Refer patient to GI.

## 2019-09-18 PROCEDURE — 74011250636 HC RX REV CODE- 250/636

## 2019-10-03 ENCOUNTER — PATIENT OUTREACH (OUTPATIENT)
Dept: INTERNAL MEDICINE CLINIC | Age: 64
End: 2019-10-03

## 2019-11-11 ENCOUNTER — PATIENT OUTREACH (OUTPATIENT)
Dept: CASE MANAGEMENT | Age: 64
End: 2019-11-11

## 2019-11-11 RX ORDER — PANTOPRAZOLE SODIUM 40 MG/1
40 TABLET, DELAYED RELEASE ORAL
COMMUNITY
Start: 2019-11-07 | End: 2019-12-08

## 2019-11-11 RX ORDER — POLYETHYLENE GLYCOL 3350 17 G/17G
17 POWDER, FOR SOLUTION ORAL
COMMUNITY
Start: 2019-11-07 | End: 2019-11-15

## 2019-11-11 RX ORDER — GUAIFENESIN 100 MG/5ML
81 LIQUID (ML) ORAL
COMMUNITY
Start: 2019-11-06 | End: 2019-12-08

## 2019-11-11 RX ORDER — PREDNISONE 10 MG/1
TABLET ORAL
COMMUNITY
Start: 2019-11-09 | End: 2019-12-16

## 2019-11-11 RX ORDER — CIPROFLOXACIN 500 MG/1
500 TABLET ORAL
COMMUNITY
Start: 2019-11-08 | End: 2019-11-15

## 2019-11-11 RX ORDER — SULFAMETHOXAZOLE AND TRIMETHOPRIM 800; 160 MG/1; MG/1
160 TABLET ORAL
COMMUNITY
Start: 2019-11-11 | End: 2019-12-11

## 2019-11-11 RX ORDER — DIGOXIN 125 MCG
0.12 TABLET ORAL DAILY
COMMUNITY
Start: 2019-11-09 | End: 2019-12-09

## 2019-11-11 RX ORDER — SILDENAFIL CITRATE 20 MG/1
40 TABLET ORAL
COMMUNITY
Start: 2019-11-04 | End: 2019-12-08

## 2019-11-11 NOTE — Clinical Note
Good afternoon everyone, I just spoke with Ms Suma Lee for ARABELLA. She is scheduled for hospital follow up on Wednesday @ 10:45 it can be ARABELLA. I reviewed her medications and changes were made. Per [facility] discharge she is to stop amlodipine, metoprolol, tarsemide and potassium chloride. I reminded her to bring in all her paperwork from [facility]. She did complain of rt arm incisional discomfort stated her niece looked at it and it was intact.  Have a good day, Spring

## 2019-11-11 NOTE — PROGRESS NOTES
Outside [facility] Discharge Follow-Up      Date/Time:  2019 1:52 PM    Patient was admitted to [facility] on 10/18/2019 and discharged on 2019 for replacement of mitral valve and CABG. The physician discharge summary was not available at the time of outreach. Patient was contacted within 1 business days of discharge. Method of communication with provider :staff message    LPN Care Coordinator contacted the patient by telephone to perform post hospital discharge follow up. Verified name and  with patient as identifiers. Provided introduction to self, and explanation of the LPN Care Coordinator role. Patient received hospital discharge instructions. LPN Care Coordinator reviewed discharge instructions and red flags with patient who verbalized understanding. Patient given an opportunity to ask questions and does not have any further questions or concerns at this time. The patient agrees to contact the PCP office for questions related to their healthcare. LPN Care Coordinator provided contact information for future reference. Home Health orders at discharge: [address]: n/a  Date of initial visit: [facility] ordered at discharge: none  [address] received: n/a    Medication(s):   Medication review was performed with patient, who verbalizes understanding of administration of home medications. There were no barriers to obtaining medications identified at this time.     Advance Care Planning:   Does patient have an Advance Directive:  not on file     Hakeem Roque follow up appointment(s):   Future Appointments   Date Time Provider Muriel Paulino   2019 10:45 AM Margy Cardoza MD [facility]      Non-[facility] follow up appointment(s): Jose Kaufman NP. 2019 (cardiology) Jose Mendieta ( cardiac surgeon) Nahed:  information provided as a resource

## 2019-11-13 ENCOUNTER — OFFICE VISIT (OUTPATIENT)
Dept: INTERNAL MEDICINE CLINIC | Age: 64
End: 2019-11-13

## 2019-11-13 VITALS
SYSTOLIC BLOOD PRESSURE: 132 MMHG | RESPIRATION RATE: 15 BRPM | BODY MASS INDEX: 21.59 KG/M2 | HEART RATE: 93 BPM | WEIGHT: 96 LBS | OXYGEN SATURATION: 100 % | HEIGHT: 56 IN | TEMPERATURE: 97.9 F | DIASTOLIC BLOOD PRESSURE: 88 MMHG

## 2019-11-13 DIAGNOSIS — I10 ESSENTIAL HYPERTENSION, BENIGN: Primary | Chronic | ICD-10-CM

## 2019-11-13 DIAGNOSIS — Z95.2 S/P MITRAL VALVE REPLACEMENT: ICD-10-CM

## 2019-11-13 DIAGNOSIS — F41.0 ANXIETY ATTACK: ICD-10-CM

## 2019-11-13 DIAGNOSIS — I50.32 CHRONIC DIASTOLIC CONGESTIVE HEART FAILURE (HCC): ICD-10-CM

## 2019-11-13 DIAGNOSIS — I48.91 ATRIAL FIBRILLATION, UNSPECIFIED TYPE (HCC): ICD-10-CM

## 2019-11-13 RX ORDER — ALPRAZOLAM 0.25 MG/1
0.25 TABLET ORAL
Qty: 20 TAB | Refills: 0 | Status: SHIPPED | OUTPATIENT
Start: 2019-11-13

## 2019-11-13 NOTE — PROGRESS NOTES
HISTORY OF PRESENT ILLNESS  Laura Vargas is a 70 y.o. female here for hospital follow-up. She was admitted to [address] [place] [address] and underwent mitral valve repair and CABG. She is doing well right now had a UTI, taking ciprofloxacin for UTI. Feeling better. Multiple medications discontinued in the hospital.  She is not on diuretics right now. Metoprolol and amlodipine were discontinued also. Reported mild shortness of breath with exertion. No chest pain or palpitation. Has diastolic heart failure. Seems compensated. No orthopnea. Has pulmonary hypertension. Taking Revatio. Follow-up with her cardiologist.  Has coronary artery disease, hypertension, on medicine. Doing well. No chest pain palpitation shortness of breath. All medications re reconciled. HPI      Review of Systems   Constitutional: Negative. HENT: Negative. Eyes: Negative. Respiratory: Negative. Cardiovascular: Negative. Gastrointestinal: Negative. Genitourinary: Negative. Musculoskeletal: Negative. Skin: Negative. Neurological: Negative. Endo/Heme/Allergies: Negative. Psychiatric/Behavioral: Negative. Physical Exam   Constitutional: She appears well-developed and well-nourished. No distress. Neck: Normal range of motion. Neck supple. No JVD present. No thyromegaly present. Cardiovascular: Normal rate, regular rhythm, normal heart sounds and intact distal pulses. Pulmonary/Chest: Effort normal and breath sounds normal. No respiratory distress. She has no wheezes. Abdominal: Soft. Bowel sounds are normal. She exhibits no distension. There is no tenderness. Musculoskeletal: She exhibits no edema or tenderness. Lymphadenopathy:     She has no cervical adenopathy. Psychiatric: She has a normal mood and affect. Her behavior is normal.       ASSESSMENT and PLAN  Diagnoses and all orders for this visit:    1.  Essential hypertension, benign    From the [address] [place] [address].  Will monitor blood pressure closely. Be on DASH diet. -     METABOLIC PANEL, COMPREHENSIVE  -     CBC WITH AUTOMATED DIFF    2. Atrial fibrillation, unspecified type ([address]  Rate and rhythm control. On digoxin and Xarelto. Stable blood pressure. Metoprolol and amlodipine were discontinued  -     METABOLIC PANEL, COMPREHENSIVE  -     CBC WITH AUTOMATED DIFF    3. Chronic diastolic congestive heart failure (HCC)     Demadex discontinued. Weight seems stable. Advised her to be on low-salt diet. She is on Revatio for pulmonary hypertension. She will see you via cardiology clinic in few weeks. -     METABOLIC PANEL, COMPREHENSIVE  -     CBC WITH AUTOMATED DIFF  Will fax lab results to Dr. Susan Mcdonald in [facility]   4. Anxiety attack    still remain anxious at night. Not able to sleep at night. Will give,.  -     ALPRAZolam (XANAX) 0.25 mg tablet; Take 1 Tab by mouth nightly as needed for Anxiety. Max Daily Amount: 0.25 mg. #20 no refill. 5.  Status post mitral valve replacement  Done in [facility] by Dr. Susan Mcdonald.  She will follow-up with him. Discussed expected course/resolution/complications of diagnosis in detail with patient. Medication risks/benefits/costs/interactions/alternatives discussed with patient. Pt was given an after visit summary which includes diagnoses, current medications & vitals. Pt expressed understanding with the diagnosis and plan.

## 2019-11-13 NOTE — PATIENT INSTRUCTIONS
Avoiding Triggers With Heart Failure: Care Instructions Your Care Instructions Triggers are anything that make your heart failure flare up. A flare-up is also called \"sudden heart failure\" or \"acute heart failure. \" When you have a flare-up, fluid builds up in your lungs, and you have problems breathing. You might need to go to the hospital. By watching for changes in your condition and avoiding triggers, you can prevent heart failure flare-ups. Follow-up care is a key part of your treatment and safety. Be sure to make and go to all appointments, and call your doctor if you are having problems. It's also a good idea to know your test results and keep a list of the medicines you take. How can you care for yourself at home? Watch for changes in your weight and condition · Weigh yourself without clothing at the same time each day. Record your weight. Call your doctor if you have sudden weight gain, such as more than 2 to 3 pounds in a day or 5 pounds in a week. (Your doctor may suggest a different range of weight gain.) A sudden weight gain may mean that your heart failure is getting worse. · Keep a daily record of your symptoms. Write down any changes in how you feel, such as new shortness of breath, cough, or problems eating. Also record if your ankles are more swollen than usual and if you feel more tired than usual. Note anything that you ate or did that could have triggered these changes. Limit sodium Sodium causes your body to hold on to extra water. This may cause your heart failure symptoms to get worse. People get most of their sodium from processed foods. Fast food and restaurant meals also tend to be very high in sodium. · Your doctor may suggest that you limit sodium to 2,000 milligrams (mg) a day or less. That is less than 1 teaspoon of salt a day, including all the salt you eat in cooking or in packaged foods. · Read food labels on cans and food packages.  They tell you how much sodium you get in one serving. Check the serving size. If you eat more than one serving, you are getting more sodium. · Be aware that sodium can come in forms other than salt, including monosodium glutamate (MSG), sodium citrate, and sodium bicarbonate (baking soda). MSG is often added to Asian food. You can sometimes ask for food without MSG or salt. · Slowly reducing salt will help you adjust to the taste. Take the salt shaker off the table. · Flavor your food with garlic, lemon juice, onion, vinegar, herbs, and spices instead of salt. Do not use soy sauce, steak sauce, onion salt, garlic salt, mustard, or ketchup on your food, unless it is labeled \"low-sodium\" or \"low-salt. \" 
· Make your own salad dressings, sauces, and ketchup without adding salt. · Use fresh or frozen ingredients, instead of canned ones, whenever you can. Choose low-sodium canned goods. · Eat less processed food and food from restaurants, including fast food. Exercise as directed Moderate, regular exercise is very good for your heart. It improves your blood flow and helps control your weight. But too much exercise can stress your heart and cause a heart failure flare-up. · Check with your doctor before you start an exercise program. 
· Walking is an easy way to get exercise. Start out slowly. Gradually increase the length and pace of your walk. Swimming, riding a bike, and using a treadmill are also good forms of exercise. · When you exercise, watch for signs that your heart is working too hard. You are pushing yourself too hard if you cannot talk while you are exercising. If you become short of breath or dizzy or have chest pain, stop, sit down, and rest. 
· Do not exercise when you do not feel well. Take medicines correctly · Take your medicines exactly as prescribed. Call your doctor if you think you are having a problem with your medicine. · Make a list of all the medicines you take.  Include those prescribed to you by other doctors and any over-the-counter medicines, vitamins, or supplements you take. Take this list with you when you go to any doctor. · Take your medicines at the same time every day. It may help you to post a list of all the medicines you take every day and what time of day you take them. · Make taking your medicine as simple as you can. Plan times to take your medicines when you are doing other things, such as eating a meal or getting ready for bed. This will make it easier to remember to take your medicines. · Get organized. Use helpful tools, such as daily or weekly pill containers. When should you call for help? Call 911 if you have symptoms of sudden heart failure such as: 
  · You have severe trouble breathing.  
  · You cough up pink, foamy mucus.  
  · You have a new irregular or rapid heartbeat.  
 Call your doctor now or seek immediate medical care if: 
  · You have new or increased shortness of breath.  
  · You are dizzy or lightheaded, or you feel like you may faint.  
  · You have sudden weight gain, such as more than 2 to 3 pounds in a day or 5 pounds in a week. (Your doctor may suggest a different range of weight gain.)  
  · You have increased swelling in your legs, ankles, or feet.  
  · You are suddenly so tired or weak that you cannot do your usual activities.  
 Watch closely for changes in your health, and be sure to contact your doctor if you develop new symptoms. Where can you learn more? Go to http://payam-laurence.info/. Enter X870 in the search box to learn more about \"Avoiding Triggers With Heart Failure: Care Instructions. \" Current as of: April 9, 2019 Content Version: 12.2 © 4173-6541 [employer]. Care instructions adapted under license by [employer] (which disclaims liability or warranty for this information).  If you have questions about a medical condition or this instruction, always ask your healthcare professional. [place] [address] any warranty or liability for your use of this information.

## 2019-11-13 NOTE — PROGRESS NOTES
Health Maintenance Due   Topic Date Due    Shingrix Vaccine Age 49> (1 of 2) 12/19/2003    COLONOSCOPY  12/27/2016    BREAST CANCER SCRN MAMMOGRAM  11/27/2018    GLAUCOMA SCREENING Q2Y  12/19/2018    Influenza Age 9 to Adult  08/01/2019       Chief Complaint   Patient presents with    Hypertension    Cholesterol Problem    CHF       1. Have you been to the ER, urgent care clinic since your last visit? Hospitalized since your last visit? No    2. Have you seen or consulted any other health care providers outside of the [address] [place] [address] since your last visit? Include any pap smears or colon screening. Yes, heart surgery at [facility]    3) Do you have an Advance Directive on file? no    4) Are you interested in receiving information on Advance Directives? NO      Patient is accompanied by basilia I have received verbal consent from Eliud Del Cid to discuss any/all medical information while they are present in the room.

## 2019-11-14 LAB
ALBUMIN SERPL-MCNC: 4.6 G/DL (ref 3.6–4.8)
ALBUMIN/GLOB SERPL: 1.6 {RATIO} (ref 1.2–2.2)
ALP SERPL-CCNC: 234 IU/L (ref 39–117)
ALT SERPL-CCNC: 117 IU/L (ref 0–32)
AST SERPL-CCNC: 140 IU/L (ref 0–40)
BASOPHILS # BLD AUTO: 0 X10E3/UL (ref 0–0.2)
BASOPHILS NFR BLD AUTO: 0 %
BILIRUB SERPL-MCNC: 2.3 MG/DL (ref 0–1.2)
BUN SERPL-MCNC: 11 MG/DL (ref 8–27)
BUN/CREAT SERPL: 11 (ref 12–28)
CALCIUM SERPL-MCNC: 9.7 MG/DL (ref 8.7–10.3)
CHLORIDE SERPL-SCNC: 94 MMOL/L (ref 96–106)
CO2 SERPL-SCNC: 20 MMOL/L (ref 20–29)
CREAT SERPL-MCNC: 0.99 MG/DL (ref 0.57–1)
EOSINOPHIL # BLD AUTO: 0.1 X10E3/UL (ref 0–0.4)
EOSINOPHIL NFR BLD AUTO: 0 %
ERYTHROCYTE [DISTWIDTH] IN BLOOD BY AUTOMATED COUNT: 18.4 % (ref 12.3–15.4)
GLOBULIN SER CALC-MCNC: 2.9 G/DL (ref 1.5–4.5)
GLUCOSE SERPL-MCNC: 81 MG/DL (ref 65–99)
HCT VFR BLD AUTO: 35.6 % (ref 34–46.6)
HGB BLD-MCNC: 11.6 G/DL (ref 11.1–15.9)
IMM GRANULOCYTES # BLD AUTO: 0 X10E3/UL (ref 0–0.1)
IMM GRANULOCYTES NFR BLD AUTO: 0 %
LYMPHOCYTES # BLD AUTO: 0.8 X10E3/UL (ref 0.7–3.1)
LYMPHOCYTES NFR BLD AUTO: 6 %
MCH RBC QN AUTO: 30.9 PG (ref 26.6–33)
MCHC RBC AUTO-ENTMCNC: 32.6 G/DL (ref 31.5–35.7)
MCV RBC AUTO: 95 FL (ref 79–97)
MONOCYTES # BLD AUTO: 0.7 X10E3/UL (ref 0.1–0.9)
MONOCYTES NFR BLD AUTO: 5 %
NEUTROPHILS # BLD AUTO: 13 X10E3/UL (ref 1.4–7)
NEUTROPHILS NFR BLD AUTO: 89 %
PLATELET # BLD AUTO: 222 X10E3/UL (ref 150–450)
POTASSIUM SERPL-SCNC: 4 MMOL/L (ref 3.5–5.2)
PROT SERPL-MCNC: 7.5 G/DL (ref 6–8.5)
RBC # BLD AUTO: 3.76 X10E6/UL (ref 3.77–5.28)
SODIUM SERPL-SCNC: 132 MMOL/L (ref 134–144)
WBC # BLD AUTO: 14.6 X10E3/UL (ref 3.4–10.8)

## 2019-11-27 NOTE — PROGRESS NOTES
Called the pt's sister, Martha Guidry (on release of info), and after verifying HIPAA, advised her of the pt's lab results and the provider's recommendations. She was provided the name and contact information for Dr. Annie Lou. Soraya voiced thanks and understanding.

## 2019-12-10 ENCOUNTER — PATIENT OUTREACH (OUTPATIENT)
Dept: INTERNAL MEDICINE CLINIC | Age: 64
End: 2019-12-10

## 2019-12-10 NOTE — PROGRESS NOTES
Patient has graduated from the Transitions of Care Coordination  program on 12/10/2019. Patient/family has the ability to self-manage at this time Care management goals have been completed. Patient was not referred to the [facility] team for further management. Patient has Care Transition Nurse's contact information for any further questions, concerns, or needs.   Patients upcoming visits:    Future Appointments   Date Time Provider Muriel Paulino   12/13/2019  8:00 AM Landry Linares MD [facility]   12/26/2019 11:30 AM Margy Cardoza MD [facility] [facility]

## 2019-12-13 ENCOUNTER — OFFICE VISIT (OUTPATIENT)
Dept: HEMATOLOGY | Age: 64
End: 2019-12-13

## 2019-12-13 VITALS
SYSTOLIC BLOOD PRESSURE: 151 MMHG | BODY MASS INDEX: 22.04 KG/M2 | HEART RATE: 105 BPM | TEMPERATURE: 96.5 F | DIASTOLIC BLOOD PRESSURE: 76 MMHG | WEIGHT: 98 LBS | OXYGEN SATURATION: 87 % | HEIGHT: 56 IN

## 2019-12-13 DIAGNOSIS — R74.8 ELEVATED LIVER ENZYMES: Primary | ICD-10-CM

## 2019-12-13 DIAGNOSIS — R94.5 ABNORMAL RESULTS OF LIVER FUNCTION STUDIES: ICD-10-CM

## 2019-12-13 DIAGNOSIS — Z11.59 ENCOUNTER FOR SCREENING FOR OTHER VIRAL DISEASES: ICD-10-CM

## 2019-12-13 DIAGNOSIS — I25.810 CORONARY ARTERY DISEASE INVOLVING CORONARY BYPASS GRAFT OF NATIVE HEART WITHOUT ANGINA PECTORIS: ICD-10-CM

## 2019-12-13 RX ORDER — DIGOXIN 125 MCG
0.12 TABLET ORAL DAILY
COMMUNITY
End: 2020-09-16 | Stop reason: RX

## 2019-12-13 RX ORDER — GUAIFENESIN 100 MG/5ML
81 LIQUID (ML) ORAL DAILY
COMMUNITY
End: 2020-10-29 | Stop reason: ALTCHOICE

## 2019-12-13 RX ORDER — SULFAMETHOXAZOLE AND TRIMETHOPRIM 800; 160 MG/1; MG/1
1 TABLET ORAL 2 TIMES DAILY
COMMUNITY
End: 2020-08-12 | Stop reason: ALTCHOICE

## 2019-12-13 RX ORDER — PANTOPRAZOLE SODIUM 40 MG/1
40 TABLET, DELAYED RELEASE ORAL DAILY
COMMUNITY
End: 2021-05-14 | Stop reason: SDUPTHER

## 2019-12-13 RX ORDER — FUROSEMIDE 20 MG/1
TABLET ORAL DAILY
COMMUNITY
End: 2020-10-29 | Stop reason: ALTCHOICE

## 2019-12-13 RX ORDER — SILDENAFIL CITRATE 20 MG/1
20 TABLET ORAL 3 TIMES DAILY
COMMUNITY
End: 2020-10-29 | Stop reason: ALTCHOICE

## 2019-12-13 NOTE — Clinical Note
12/22/19 Patient: Renate Zhang YOB: 1955 Date of Visit: 12/13/2019 Rea Jones MD 
[address] [place] [id number] VIA In Basket Dear Rea Jones MD, Thank you for referring Ms. Bautista Peraza to [address] for evaluation. My notes for this consultation are attached. If you have questions, please do not hesitate to call me. I look forward to following your patient along with you. Sincerely, Sandra Pickering MD

## 2019-12-13 NOTE — PROGRESS NOTES
[address] [place] [address] [facility], MD Jose Carlos Fisher PA-C Marjory Churches, [facility]     Kellie Hogue, [facility]   SUNDEEP Youngblood-STEPHAN James [facility]       Ines Deputado Paul De [address]as 136    at [address], [address] [place] [address], [facility] 22.    [phone]    FAX: [address] [place] [address], [address], [address]    [phone]    FAX: [phone]       Patient Care Team:  Shanna Underwood MD as PCP - General (Internal Medicine)  Shanna Underwood MD as PCP - [facility]  Margarito Moura MD as Referring Provider (Cardiology)  Deri Merlin, MD (Radiology)  Soledad Godwin MD (Colon and Rectal Surgery)  Estrada Perez MD (Nephrology)      Problem List  Date Reviewed: 7/24/2019          Codes Class Noted    Pneumonia ICD-10-CM: J18.9  ICD-9-CM: 652  5/4/2016        Paroxysmal atrial fibrillation ([facility] ICD-10-CM: I48.0  ICD-9-CM: 427.31  5/4/2016        Alcohol abuse ICD-10-CM: F10.10  ICD-9-CM: 305.00  Unknown    Overview Signed 2/25/2016 10:43 PM by Peter CROSS     2/25/16 phone note             Elevated brain natriuretic peptide (BNP) level ICD-10-CM: R79.89  ICD-9-CM: 790.99  5/5/2015        Atrial myxoma ICD-10-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 Julio Bentley.              Cardiomyopathy ([facility] ICD-10-CM: I42.9  ICD-9-CM: 425.4  10/18/2013        Hyponatremia ICD-10-CM: E87.1  ICD-9-CM: 276.1  53/91/0217        Diastolic heart failure (HCC) ICD-10-CM: I50.30  ICD-9-CM: 428.30  10/17/2013        Back pain, thoracic ICD-10-CM: M54.6  ICD-9-CM: 724.1  9/25/2013        Elevated liver enzymes ICD-10-CM: R74.8  ICD-9-CM: 790.5  9/1/2013    Overview Signed 10/23/2013  6:07 PM by Sadie BENAVIDES,      AST, ALT, Alk P             CAD (coronary artery disease), native coronary artery ICD-10-CM: I25.10  ICD-9-CM: 414.01  6/22/2011    Overview Signed 6/22/2011 11:19 AM by Eb Myers MD     5/11 single vessel bypass to rca at time of mitral valve repair             Mitral regurgitation ICD-10-CM: I34.0  ICD-9-CM: 424.0  3/21/2011    Overview Addendum 6/22/2011 11:19 AM by Eb Myers MD     Severe by RAEKSH 4- leaflets both prolapse and do not coapt. 5/11 mitral valve repair with single svg to rca. MVP (mitral valve prolapse) ICD-10-CM: I34.1  ICD-9-CM: 424.0  6/4/2010    Overview Addendum 1/13/2011  8:11 AM by Eb Myers MD     Date of most recent echocardiogram was 12/23/08. Echocardiogram demonstrated normal LV wall motion and ejection fraction, left atrial enlargement, moderate mitral regurgitation and severe mitral regurgitation. The anterior leaflet was myxomatous and there was some mild prolapse             Pure hypercholesterolemia ICD-10-CM: E78.00  ICD-9-CM: 272.0  10/19/2009        Essential hypertension, benign (Chronic) ICD-10-CM: I10  ICD-9-CM: 401.1  10/19/2009        Hematuria, unspecified ICD-10-CM: R31.9  ICD-9-CM: 599.70  10/19/2009        Diffuse cystic mastopathy ICD-10-CM: N60.19  ICD-9-CM: 610.1  10/19/2009        Osteopenia ICD-10-CM: M85.80  ICD-9-CM: 733.90  10/19/2009              The clinicians listed above have asked me to see Ted Stringer in consultation regarding elevated liver enzymes and its management. All medical records sent by the referring physicians were reviewed  including imaging studies and pathology. The patient is a 70 y.o. Black female who was found to have elevated liver transaminases and alkaline phosphatase in 2016.       Serologic evaluation for markers of chronic liver disease was negative for HCV,     Ultrasound of the liver was performed in 9/2019. The results of the imaging demonstrated a normal appearing liver and some ascites. The patient underwent CABG at [facility] 10/2018. An assessment of liver fibrosis with biopsy or elastography has not been performed. The patient has no symptoms which can be attributed to the liver disorder. The patient is not currently experiencing the following symptoms of liver disease:  fatigue, pain in the right side over the liver,     The patient completes all daily activities without any functional limitations. ASSESSMENT AND PLAN:  Elevated liver enzymes  Intermittent elevation in liver transaminases and alkaline phosphatase of unclear etiology at this time. Have performed laboratory testing to monitor liver function and degree of liver injury. This included BMP, hepatic panel, CBC with platelet count, INR. Liver transaminases are now normal.  ALP is elevated. Liver function is normal.  Total bilirubin was elevated but is now down to normal.  The platelet count is normal.      Serologic testing for causes of chronic liver disease were ordered. All were negative     The most likely causes for the liver chemistry abnormalities were discussed with the patient and include passive hepatic congestion. The need to perform an assessment of liver fibrosis was discussed with the patient. The Fibroscan can assess liver fibrosis and determine if a patient has advanced fibrosis or cirrhosis without the need for liver biopsy. This will be performed at the next office visit. If the Fibroscan suggests advanced fibrosis then a liver biopsy should be considered. The Fibroscan can be repeated annually or as often as clinically indicated to assess for fibrosis progression and/or regression. Passive hepatic congestion  The patient has passive hepatic congestion that is likely due to RV dysfunction, and TR. It is not clear if the patient also has cirrhosis.   Will obtain ECHO to assess RV function and for TR. Ascites   Ascites this is likely due to passive hepatic congestion. Will continue the current dose of diuretics with lasix 20 mg every day  The patient was counseled regarding the need to maintain sodium restriction and the types of foods containing high amounts of sodium to be avoided. Screening for Hepatocellular Carcinoma  HCC screening is not necessary if the patient has no evidence of cirrhosis. Treatment of other medical problems in patients with chronic liver disease  There are no contraindications for the patient to take most medications that are necessary for treatment of other medical issues. Counseling for alcohol in patients with chronic liver disease  The patient was counseled regarding alcohol consumption and the effect of alcohol on chronic liver disease. The patient does not consume any significant amount of alcohol. Vaccinations   Vaccination for viral hepatitis A is not needed. The patient has serologic evidence of prior exposure or vaccination with immunity. Routine vaccinations against other bacterial and viral agents can be performed as indicated. Annual flu vaccination should be administered if indicated. ALLERGIES  Allergies   Allergen Reactions    Amoxicillin Diarrhea     1 day after Doxycycline    Doxycycline Diarrhea     100mg single dose \"too strong\" and caused diarrhea       MEDICATIONS  Current Outpatient Medications   Medication Sig    aspirin 81 mg chewable tablet Take 81 mg by mouth daily. Take 1 tab daily    furosemide (LASIX) 20 mg tablet Take  by mouth daily. 1 tab daily    digoxin (LANOXIN) 0.125 mg tablet Take 0.125 mg by mouth daily. 1 tab daily    pantoprazole (PROTONIX) 40 mg tablet Take 40 mg by mouth daily. 1 tab daily    sildenafil, pulm. hypertension, (REVATIO) 20 mg tablet Take 20 mg by mouth three (3) times daily.  2tabs by mouth 3 times a day    trimethoprim-sulfamethoxazole (BACTRIM DS, SEPTRA DS) 160-800 mg per tablet Take 1 Tab by mouth two (2) times a day. 1 tab daily on  Monday ,  and Friday    predniSONE (DELTASONE) 10 mg tablet Take  by mouth. 4 tab daily for 7 days then 2 tabs a day for 30 days    calcium-vitamin D (OYSTER SHELL) 500 mg(1,250mg) -200 unit per tablet Take 1 Tab by mouth two (2) times daily (with meals).  rivaroxaban (XARELTO) 15 mg tab tablet Take  by mouth daily.  atorvastatin (LIPITOR) 40 mg tablet Take 1 Tab by mouth daily.  ALPRAZolam (XANAX) 0.25 mg tablet Take 1 Tab by mouth nightly as needed for Anxiety. Max Daily Amount: 0.25 mg.    CITRUS CALCIUM tablet TAKE ONE TABLET BY MOUTH DAILY WITH BREAKFAST    lansoprazole (PREVACID) 30 mg capsule Take 1 Cap by mouth Daily (before breakfast). Indications: HEARTBURN     No current facility-administered medications for this visit. SYSTEM REVIEW NOT RELATED TO LIVER DISEASE OR REVIEWED ABOVE:  Constitution systems: Negative for fever, chills, weight gain, weight loss. Eyes: Negative for visual changes. ENT: Negative for sore throat, painful swallowing. Respiratory: Negative for cough, hemoptysis, SOB. Cardiology: Negative for chest pain, palpitations. GI:  Negative for constipation or diarrhea. : Negative for urinary frequency, dysuria, hematuria, nocturia. Skin: Negative for rash. Hematology: Negative for easy bruising, blood clots. Musculo-skelatal: Negative for back pain, muscle pain, weakness. Neurologic: Negative for headaches, dizziness, vertigo, memory problems not related to HE. Psychology: Negative for anxiety, depression. FAMILY HISTORY:  The father  of cancer. The mother Has/had the following chronic disease(s): CABG. There is no family history of liver disease. SOCIAL HISTORY:  The patient is . The patient has 1 child and 3 grandchildren. The patient stopped using tobacco products in 2019. The patient has never consumed significant amounts of alcohol. The patient used to work for an insurance company. PHYSICAL EXAMINATION:  Visit Vitals  /76 (BP 1 Location: Left arm, BP Patient Position: Sitting)   Pulse (!) 105   Temp 96.5 °F (35.8 °C) (Temporal)   Ht 4' 8\" (1.422 m)   Wt 98 lb (44.5 kg)   SpO2 (!) 87%   BMI 21.97 kg/m²     General: No acute distress. Eyes: Sclera anicteric. ENT: No oral lesions. Thyroid normal.  Nodes: No adenopathy. Skin: No spider angiomata. No jaundice. No palmar erythema. Respiratory: Lungs clear to auscultation. Cardiovascular: positive for JVD. Abdomen: Soft non-tender. Liver size normal to percussion/palpation. Spleen not palpable. No obvious ascites. Extremities: No edema. No muscle wasting. No gross arthritic changes. Neurologic: Alert and oriented. Cranial nerves grossly intact. No asterixis. LABORATORY STUDIES:  Liver [place] of [address] Units 12/13/2019 11/13/2019   WBC 3.4 - 10.8 x10E3/uL 11.3 (H) 14.6 (H)   ANC 1.4 - 7.0 x10E3/uL 8.4 (H) 13.0 (H)   HGB 11.1 - 15.9 g/dL 10.5 (L) 11.6    - 450 x10E3/uL 317 222   INR 0.8 - 1.2 1.3 (H)    AST 0 - 40 IU/L 27 140 (H)   ALT 0 - 32 IU/L 18 117 (H)   Alk Phos 39 - 117 IU/L 145 (H) 234 (H)   Bili, Total 0.0 - 1.2 mg/dL 1.1 2.3 (H)   Bili, Direct 0.00 - 0.40 mg/dL 0.47 (H)    Albumin 3.6 - 4.8 g/dL 4.4 4.6   BUN 8 - 27 mg/dL 15 11   Creat 0.57 - 1.00 mg/dL 1.00 0.99   Na 134 - 144 mmol/L 133 (L) 132 (L)   K 3.5 - 5.2 mmol/L 3.5 4.0   Cl 96 - 106 mmol/L 90 (L) 94 (L)   CO2 20 - 29 mmol/L 25 20   Glucose 65 - 99 mg/dL 76 81   Magnesium 1.6 - 2.4 mg/dL       SEROLOGIES:  10/2019.   Anti-HCV negative    Serologies Latest Ref Rng & Units 12/13/2019   Hep A Ab, Total Negative Positive (A)   Hep B Surface Ag Negative Negative   Hep B Core Ab, Total Negative Negative   Hep B Surface AB QL  Non Reactive   Hep C Ab 0.0 - 0.9 s/co ratio Negative   Ferritin 15 - 150 ng/mL 117   Iron % Saturation 15 - 55 % 7 (LL)   ALYSSA, IFA  Negative C-ANCA Neg:<1:20 titer <1:20   P-ANCA Neg:<1:20 titer <1:20   ANCA Neg:<1:20 titer <1:20   ASMCA 0 - 19 Units 17   M2 Ab 0.0 - 20.0 Units <20.0   Alpha-1 antitrypsin level 101 - 187 mg/dL 211 (H)     LIVER HISTOLOGY:  Not available or performed    ENDOSCOPIC PROCEDURES:  Not available or performed    RADIOLOGY:  9/2019. Ultrasound of liver. Normal appearing liver. No liver mass lesions. Small amount of ascites. OTHER TESTING:  Not available or performed    FOLLOW-UP:  All of the issues listed above in the Assessment and Plan were discussed with the patient. All questions were answered. The patient expressed a clear understanding of the above. [address] [place] [address] in 4 weeks to review all data and determine the treatment plan.       Laura Lucas MD  [address] [place] [address] 22.  [phone]  [address] [place] [address]

## 2019-12-13 NOTE — PROGRESS NOTES
Cristina Talbert is a 70 y.o. female  Chief Complaint   Patient presents with    New Patient         Visit Vitals  /76 (BP 1 Location: Left arm, BP Patient Position: Sitting)   Pulse (!) 105   Temp 96.5 °F (35.8 °C) (Temporal)   Ht 4' 8\" (1.422 m)   Wt 98 lb (44.5 kg)   SpO2 (!) 87%   BMI 21.97 kg/m²     3 most recent PHQ Screens 11/13/2019   Little interest or pleasure in doing things Not at all   Feeling down, depressed, irritable, or hopeless Not at all   Total Score PHQ 2 0     Learning Assessment 10/3/2017   PRIMARY LEARNER Patient   HIGHEST LEVEL OF EDUCATION - PRIMARY LEARNER  [id number] [address] PRIMARY LEARNER -   CO-LEARNER CAREGIVER -   PRIMARY LANGUAGE ENGLISH   LEARNER PREFERENCE PRIMARY DEMONSTRATION     -   ANSWERED BY Patient   RELATIONSHIP SELF     Abuse Screening Questionnaire 11/13/2019   Do you ever feel afraid of your partner? N   Are you in a relationship with someone who physically or mentally threatens you? N   Is it safe for you to go home?  Clyde Medina

## 2019-12-14 LAB
A1AT SERPL-MCNC: 211 MG/DL (ref 101–187)
ALBUMIN SERPL-MCNC: 4.4 G/DL (ref 3.6–4.8)
ALP SERPL-CCNC: 145 IU/L (ref 39–117)
ALT SERPL-CCNC: 18 IU/L (ref 0–32)
AST SERPL-CCNC: 27 IU/L (ref 0–40)
BASOPHILS # BLD AUTO: 0.1 X10E3/UL (ref 0–0.2)
BASOPHILS NFR BLD AUTO: 0 %
BILIRUB DIRECT SERPL-MCNC: 0.47 MG/DL (ref 0–0.4)
BILIRUB SERPL-MCNC: 1.1 MG/DL (ref 0–1.2)
BUN SERPL-MCNC: 15 MG/DL (ref 8–27)
BUN/CREAT SERPL: 15 (ref 12–28)
CALCIUM SERPL-MCNC: 9.9 MG/DL (ref 8.7–10.3)
CHLORIDE SERPL-SCNC: 90 MMOL/L (ref 96–106)
CO2 SERPL-SCNC: 25 MMOL/L (ref 20–29)
CREAT SERPL-MCNC: 1 MG/DL (ref 0.57–1)
EOSINOPHIL # BLD AUTO: 0 X10E3/UL (ref 0–0.4)
EOSINOPHIL NFR BLD AUTO: 0 %
ERYTHROCYTE [DISTWIDTH] IN BLOOD BY AUTOMATED COUNT: 16.6 % (ref 12.3–15.4)
FERRITIN SERPL-MCNC: 117 NG/ML (ref 15–150)
GLUCOSE SERPL-MCNC: 76 MG/DL (ref 65–99)
HAV AB SER QL IA: POSITIVE
HBV CORE AB SERPL QL IA: NEGATIVE
HBV SURFACE AB SER QL: NON REACTIVE
HBV SURFACE AG SERPL QL IA: NEGATIVE
HCT VFR BLD AUTO: 32.8 % (ref 34–46.6)
HGB BLD-MCNC: 10.5 G/DL (ref 11.1–15.9)
IMM GRANULOCYTES # BLD AUTO: 0.1 X10E3/UL (ref 0–0.1)
IMM GRANULOCYTES NFR BLD AUTO: 1 %
INR PPP: 1.3 (ref 0.8–1.2)
IRON SATN MFR SERPL: 7 % (ref 15–55)
IRON SERPL-MCNC: 24 UG/DL (ref 27–139)
LYMPHOCYTES # BLD AUTO: 1.8 X10E3/UL (ref 0.7–3.1)
LYMPHOCYTES NFR BLD AUTO: 16 %
MCH RBC QN AUTO: 28.7 PG (ref 26.6–33)
MCHC RBC AUTO-ENTMCNC: 32 G/DL (ref 31.5–35.7)
MCV RBC AUTO: 90 FL (ref 79–97)
MITOCHONDRIA M2 IGG SER-ACNC: <20 UNITS (ref 0–20)
MONOCYTES # BLD AUTO: 0.9 X10E3/UL (ref 0.1–0.9)
MONOCYTES NFR BLD AUTO: 8 %
NEUTROPHILS # BLD AUTO: 8.4 X10E3/UL (ref 1.4–7)
NEUTROPHILS NFR BLD AUTO: 75 %
PLATELET # BLD AUTO: 317 X10E3/UL (ref 150–450)
POTASSIUM SERPL-SCNC: 3.5 MMOL/L (ref 3.5–5.2)
PROT SERPL-MCNC: 7 G/DL (ref 6–8.5)
PROTHROMBIN TIME: 13.1 SEC (ref 9.1–12)
RBC # BLD AUTO: 3.66 X10E6/UL (ref 3.77–5.28)
SODIUM SERPL-SCNC: 133 MMOL/L (ref 134–144)
TIBC SERPL-MCNC: 365 UG/DL (ref 250–450)
UIBC SERPL-MCNC: 341 UG/DL (ref 118–369)
WBC # BLD AUTO: 11.3 X10E3/UL (ref 3.4–10.8)

## 2019-12-15 LAB — ACTIN IGG SERPL-ACNC: 17 UNITS (ref 0–19)

## 2019-12-16 LAB
ANA TITR SER IF: NEGATIVE {TITER}
C-ANCA TITR SER IF: NORMAL TITER
P-ANCA ATYPICAL TITR SER IF: NORMAL TITER
P-ANCA TITR SER IF: NORMAL TITER

## 2019-12-23 ENCOUNTER — TELEPHONE (OUTPATIENT)
Dept: HEMATOLOGY | Age: 64
End: 2019-12-23

## 2019-12-23 NOTE — TELEPHONE ENCOUNTER
Patient called stating she is going to cancel her ECHO. Patient stated she confirmed with the cardiologist that the last ECHO she had was at [facility] at the end of October, and another is not needed at this time. Will send message to provider for feedback. Patient had no further questions.

## 2020-01-14 ENCOUNTER — TELEPHONE (OUTPATIENT)
Dept: INTERNAL MEDICINE CLINIC | Age: 65
End: 2020-01-14

## 2020-01-14 NOTE — TELEPHONE ENCOUNTER
Call placed to patient and made aware that if these medications are needed post her cardiac valve repair surgery then she needs to call her cardiologist and request these medications from the provider

## 2020-07-07 ENCOUNTER — TELEPHONE (OUTPATIENT)
Dept: INTERNAL MEDICINE CLINIC | Age: 65
End: 2020-07-07

## 2020-07-07 NOTE — TELEPHONE ENCOUNTER
Call placed to [facility] and made aware of LOV 11/2019, no show to 12/2019 appointment and no upcoming appointment that provider would be unable to sign HH orders and to reach out to patients cardiologist for orders, [facility] voiced understanding

## 2020-08-12 ENCOUNTER — VIRTUAL VISIT (OUTPATIENT)
Dept: INTERNAL MEDICINE CLINIC | Age: 65
End: 2020-08-12
Payer: MEDICARE

## 2020-08-12 DIAGNOSIS — Z71.89 ACP (ADVANCE CARE PLANNING): ICD-10-CM

## 2020-08-12 DIAGNOSIS — I10 ESSENTIAL HYPERTENSION, BENIGN: Chronic | ICD-10-CM

## 2020-08-12 DIAGNOSIS — I25.10 CORONARY ARTERY DISEASE INVOLVING NATIVE CORONARY ARTERY OF NATIVE HEART WITHOUT ANGINA PECTORIS: ICD-10-CM

## 2020-08-12 DIAGNOSIS — R26.9 GAIT ABNORMALITY: ICD-10-CM

## 2020-08-12 DIAGNOSIS — D50.0 IRON DEFICIENCY ANEMIA DUE TO CHRONIC BLOOD LOSS: Primary | ICD-10-CM

## 2020-08-12 DIAGNOSIS — Z00.00 MEDICARE ANNUAL WELLNESS VISIT, SUBSEQUENT: ICD-10-CM

## 2020-08-12 DIAGNOSIS — E78.00 PURE HYPERCHOLESTEROLEMIA: ICD-10-CM

## 2020-08-12 DIAGNOSIS — I42.9 CARDIOMYOPATHY, UNSPECIFIED TYPE (HCC): ICD-10-CM

## 2020-08-12 DIAGNOSIS — R19.5 OCCULT BLOOD POSITIVE STOOL: ICD-10-CM

## 2020-08-12 PROCEDURE — G0439 PPPS, SUBSEQ VISIT: HCPCS | Performed by: INTERNAL MEDICINE

## 2020-08-12 PROCEDURE — 99214 OFFICE O/P EST MOD 30 MIN: CPT | Performed by: INTERNAL MEDICINE

## 2020-08-12 PROCEDURE — G9899 SCRN MAM PERF RSLTS DOC: HCPCS | Performed by: INTERNAL MEDICINE

## 2020-08-12 PROCEDURE — G8756 NO BP MEASURE DOC: HCPCS | Performed by: INTERNAL MEDICINE

## 2020-08-12 PROCEDURE — G8399 PT W/DXA RESULTS DOCUMENT: HCPCS | Performed by: INTERNAL MEDICINE

## 2020-08-12 PROCEDURE — G0444 DEPRESSION SCREEN ANNUAL: HCPCS | Performed by: INTERNAL MEDICINE

## 2020-08-12 PROCEDURE — 1090F PRES/ABSN URINE INCON ASSESS: CPT | Performed by: INTERNAL MEDICINE

## 2020-08-12 PROCEDURE — 1101F PT FALLS ASSESS-DOCD LE1/YR: CPT | Performed by: INTERNAL MEDICINE

## 2020-08-12 PROCEDURE — G8510 SCR DEP NEG, NO PLAN REQD: HCPCS | Performed by: INTERNAL MEDICINE

## 2020-08-12 PROCEDURE — G8427 DOCREV CUR MEDS BY ELIG CLIN: HCPCS | Performed by: INTERNAL MEDICINE

## 2020-08-12 PROCEDURE — 99497 ADVNCD CARE PLAN 30 MIN: CPT | Performed by: INTERNAL MEDICINE

## 2020-08-12 PROCEDURE — G8536 NO DOC ELDER MAL SCRN: HCPCS | Performed by: INTERNAL MEDICINE

## 2020-08-12 PROCEDURE — 3017F COLORECTAL CA SCREEN DOC REV: CPT | Performed by: INTERNAL MEDICINE

## 2020-08-12 PROCEDURE — G8420 CALC BMI NORM PARAMETERS: HCPCS | Performed by: INTERNAL MEDICINE

## 2020-08-12 RX ORDER — LANOLIN ALCOHOL/MO/W.PET/CERES
3 CREAM (GRAM) TOPICAL
COMMUNITY
End: 2020-10-29 | Stop reason: ALTCHOICE

## 2020-08-12 NOTE — ACP (ADVANCE CARE PLANNING)
ACP discussed with pt in detail , including choosing a healthcare agent to communicate patient's healthcare decisions if patient lost the ability to make decisions, such as after a sudden illness or accident. Understanding of the healthcare agent role was assessed and information provided. Discussed values, goals, and preferences if recovery is not expected, even with continued medical treatment in the event of: Imminent death/serious illness. Recommended completion of Advance Directive form after review of ACP materials and conversation with prospective healthcare agent. Recommended communicating the plan and making copies for the healthcare agent, personal physician, and others as appropriate (e.g., health system). Recommended review of completed ACP document annually or upon change in health status. QUINN,sister,henry Donaldson. Information book and form given.   Face to face time spent was16 minutes Patient Instructions by Devora Edwards RMA at 06/14/17 10:05 AM     Author:  Devora Edwards RMA Service:  (none) Author Type:  Certified Medical Assistant     Filed:  06/14/17 10:05 AM Encounter Date:  6/14/2017 Status:  Signed     :  Devora Edwards RMA (Certified Medical Assistant)            .      Department of Dermatology        Guidelines for Care of Procedure Sites         1. Gently clean the area once a day using bar soap.    2. Gently pat the site dry, and apply sterile Vaseline packet provided by the Dermatology Department.    3. Cover the area with a new band-aid or other bandage daily for 5 days, or as needed.    4. Your biopsy will take about a week to be resulted.  We send you a letter in the mail if it comes back \"normal.\" we prefer to call you and discuss any unusual findings.  If you have not received your result within two weeks from your biopsy date, please call us at [phone].  Tell the person answering the phone that you are following up on a biopsy and need results.      After 5:00 pm please call the [facility] at [phone] if you have an urgent need to speak with a Dermatologist.     If you have questions please call Dept: [phone].      Additional Educational Resources:  For additional resources regarding your symptoms, diagnosis, or further health information, please visit the Health Resources section on [email or web address] or the Online Health Resources section in [employer].              Revision History        User Key Date/Time User Provider Type Action    > [N/A] 06/14/17 10:05 AM Devora Edwards RMA Certified Medical Assistant Sign

## 2020-08-12 NOTE — PROGRESS NOTES
Health Maintenance Due   Topic Date Due    Shingrix Vaccine Age 49> (1 of 2) 12/19/2003    Colonoscopy  12/27/2016    GLAUCOMA SCREENING Q2Y  12/19/2018    Pneumococcal 65+ years (2 of 2 - PPSV23) 05/29/2020    Influenza Age 5 to Adult  08/01/2020    Medicare Yearly Exam  07/24/2020       No chief complaint on file. 1. Have you been to the ER, urgent care clinic since your last visit? Hospitalized since your last visit? No    2. Have you seen or consulted any other health care providers outside of the [address] [place] [address] since your last visit? Include any pap smears or colon screening. Yes, last week saw Dr. Linda Esqueda    3) Do you have an Advance Directive on file? yes    4) Are you interested in receiving information on Advance Directives? NO      Patient is accompanied by niece I have received verbal consent [facility] to discuss any/all medical information while they are present in the room.

## 2020-08-12 NOTE — PATIENT INSTRUCTIONS
Medicare Wellness Visit, Female The best way to live healthy is to have a lifestyle where you eat a well-balanced diet, exercise regularly, limit alcohol use, and quit all forms of tobacco/nicotine, if applicable. Regular preventive services are another way to keep healthy. Preventive services (vaccines, screening tests, monitoring & exams) can help personalize your care plan, which helps you manage your own care. Screening tests can find health problems at the earliest stages, when they are easiest to treat. [facility] follows the current, evidence-based guidelines published by the [facility] Lenin Garcia ([facility]) when recommending preventive services for our patients. Because we follow these guidelines, sometimes recommendations change over time as research supports it. (For example, mammograms used to be recommended annually. Even though Medicare will still pay for an annual mammogram, the newer guidelines recommend a mammogram every two years for women of average risk). Of course, you and your doctor may decide to screen more often for some diseases, based on your risk and your co-morbidities (chronic disease you are already diagnosed with). Preventive services for you include: - Medicare offers their members a free annual wellness visit, which is time for you and your primary care provider to discuss and plan for your preventive service needs. Take advantage of this benefit every year! 
-All adults over the age of 72 should receive the recommended pneumonia vaccines. Current USPSTF guidelines recommend a series of two vaccines for the best pneumonia protection.  
-All adults should have a flu vaccine yearly and a tetanus vaccine every 10 years.  
-All adults age 48 and older should receive the shingles vaccines (series of two vaccines). -All adults age 38-68 who are overweight should have a diabetes screening test once every three years. -All adults born between [birth year] and 1965 should be screened once for Hepatitis C. 
-Other screening tests and preventive services for persons with diabetes include: an eye exam to screen for diabetic retinopathy, a kidney function test, a foot exam, and stricter control over your cholesterol.  
-Cardiovascular screening for adults with routine risk involves an electrocardiogram (ECG) at intervals determined by your doctor.  
-Colorectal cancer screenings should be done for adults age 54-65 with no increased risk factors for colorectal cancer. There are a number of acceptable methods of screening for this type of cancer. Each test has its own benefits and drawbacks. Discuss with your doctor what is most appropriate for you during your annual wellness visit. The different tests include: colonoscopy (considered the best screening method), a fecal occult blood test, a fecal DNA test, and sigmoidoscopy. 
 
-A bone mass density test is recommended when a woman turns 65 to screen for osteoporosis. This test is only recommended one time, as a screening. Some providers will use this same test as a disease monitoring tool if you already have osteoporosis. -Breast cancer screenings are recommended every other year for women of normal risk, age 54-69. 
-Cervical cancer screenings for women over age 72 are only recommended with certain risk factors. Here is a list of your current Health Maintenance items (your personalized list of preventive services) with a due date: 
Health Maintenance Due Topic Date Due  Shingles Vaccine (1 of 2) 12/19/2003  Colonoscopy  12/27/2016  Glaucoma Screening   12/19/2018  Pneumococcal Vaccine (2 of 2 - PPSV23) 05/29/2020  Flu Vaccine  08/01/2020

## 2020-08-12 NOTE — PROGRESS NOTES
Elder Irene is a 75 y.o. female who was seen by synchronous (real-time) audio-video technology on 8/12/2020 for Hypertension; Cholesterol Problem; Malaise; and Annual Wellness Visit        Assessment & Plan:   Diagnoses and all orders for this visit:    1. Gait abnormality  She is able to ambulate but need help with home health care for gait and balance. Also need to obtain blood work every 2 weeks. -     [address]    2. Iron deficiency anemia due to chronic blood loss    Recently she was admitted to [facility] where hemoglobin was in 4 g per DL range. She has received PRBC. Her stool was positive for blood but no active bleeding found. GI was on board, she has capsule study and all endoscopy done, did not show any active bleeding. She was placed on Protonix. Stool color has changed. No further black tarry stool. She need to have hemoglobin check every 2 weeks sure she is not dropping hemoglobin. She also need to see GI specialist and oncologist.  Has received IV iron in the hospitals. -     HGB & HCT  -     [facility], might need IV iron infusion. .  -     REFERRAL TO HOME HEALTH    3. Essential hypertension, benign  Stable blood pressure.    -     REFERRAL TO HOME HEALTH    4. Coronary artery disease involving native coronary artery of native heart without angina pectoris    Seeing cardiologist.  Has cardiomyopathy. On digoxin, statin and Xarelto. -     [address]    5. Pure hypercholesterolemia  -     REFERRAL TO HOME HEALTH    6. Cardiomyopathy, unspecified type ([facility] [address]  -     [facility]  Taking Revatio for pulmonary hypertension. 7. Occult blood positive stool    Seeing GI specialist Dr. Vicki Noble. Need to make an appointment to see him again. Will start,  -     HGB & HCT  -     REFERRAL TO GASTROENTEROLOGY  -     REFERRAL TO HEMATOLOGY ONCOLOGY  -     REFERRAL TO HOME HEALTH    8.  Medicare annual wellness visit, subsequent    9. ACP (advance care planning)        I spent at least 25 minutes on this visit with this established patient. Subjective:     Ms. Elicia [place] is staying home. She has recent admission to [facility] for severe anemia. Her hemoglobin was in 4 mg per DL range. She has received packed RBC infusion. She was seen by GI specialist and him oncologist.  Also received IV iron infusion. GI specialist found to have occult blood positive in the stool. But no active bleeding found. Capsule study was also negative. She was placed on Protonix. Her stool color change to normal.  Had a black tarry stool. She was advised to have monitored her monitor hemoglobin closely. Has coronary artery disease and pulmonary hypertension. Seeing cardiologist Dr. Jerzy Presley her echo [facility].  Seems stable. She is active but has some gait and balance issue. Need to see a therapist.  She has progressive illness. Family is thinking if she needs to be on hospice. Her niece is talking to me today, patient sister who is his niece mom is the POA, she will talk to her son. She is also here for Medicare wellness visit has no living will. Prior to Admission medications    Medication Sig Start Date End Date Taking? Authorizing Provider   melatonin 3 mg tablet Take 3 mg by mouth. Yes Provider, Historical   aspirin 81 mg chewable tablet Take 81 mg by mouth daily. Take 1 tab daily   Yes Provider, Historical   furosemide (LASIX) 20 mg tablet Take  by mouth daily. 1 tab daily   Yes Provider, Historical   digoxin (LANOXIN) 0.125 mg tablet Take 0.125 mg by mouth daily. 1 tab daily   Yes Provider, Historical   pantoprazole (PROTONIX) 40 mg tablet Take 40 mg by mouth daily. 1 tab daily   Yes Provider, Historical   sildenafil, pulm. hypertension, (REVATIO) 20 mg tablet Take 20 mg by mouth three (3) times daily.  2tabs by mouth 3 times a day   Yes Provider, Historical   ALPRAZolam (XANAX) 0.25 mg tablet Take 1 Tab by mouth nightly as needed for Anxiety. Max Daily Amount: 0.25 mg. 11/13/19  Yes Demarco Suarez MD   calcium-vitamin D (OYSTER SHELL) 500 mg(1,250mg) -200 unit per tablet Take 1 Tab by mouth two (2) times daily (with meals). 7/24/19  Yes Demarco Suarez MD   rivaroxaban Shirlyn Number) 15 mg tab tablet Take  by mouth daily. Yes Provider, Historical   CITRUS CALCIUM tablet TAKE ONE TABLET BY MOUTH DAILY WITH BREAKFAST 5/7/18  Yes Demarco Suarez MD   atorvastatin (LIPITOR) 40 mg tablet Take 1 Tab by mouth daily. 9/12/17  Yes Alea Mir MD   lansoprazole (PREVACID) 30 mg capsule Take 1 Cap by mouth Daily (before breakfast). Indications: HEARTBURN 11/4/16  Yes Alea Mir MD   trimethoprim-sulfamethoxazole (BACTRIM DS, SEPTRA DS) 160-800 mg per tablet Take 1 Tab by mouth two (2) times a day. 1 tab daily on  Monday , weds and Friday 8/12/20  Provider, Historical     Past Medical History:   Diagnosis Date    Alcohol abuse     2/25/16 phone note   Lucillie Zach 10/18/13    per CT. Dr. Mark Gasca.  Atrial myxoma 06/06/14    Dr. Armida Carlisle.  CAD (coronary artery disease)     Diastolic heart failure ([facility] [address] 10/15/13    Dr. Anuel Powell.  Diverticulosis 12/27/11    Dr. Maninder Patel.  DJD (degenerative joint disease) of knee     Dr. Mckenna Cruz Elevated cholesterol     Elevated liver enzymes 09/2013    AST, ALT, Alk P    Heartburn     Hepatic steatosis 10/18/13    Hypertension     Low sodium 06/06/14    ad[facility]    Migraine     MVP (mitral valve prolapse)     w/MR  Dr. Jimmie Jimenez. Dr. Hosea Reed. Dr. Armida Carlisle.  Osteopenia 03/04/08    Ovarian cyst, bilateral     PAF (paroxysmal atrial fibrillation) ([facility] [address] 03/21/11    New. Dr. Kayla Victoria. Dr. Hosea Reed. Dr. Elizalde Ill Severe mitral regurgitation by prior echocardiogram 03/21/11    Dr. Izabela Harrison. Dr. Elizalde Ill Uterine fibroid        ROS significant for fatigue. Has some gait and balance problem.     Objective: Patient-Reported Vitals 8/12/2020   Patient-Reported Height 4f8          Constitutional: [x] Appears well-developed and well-nourished [x] No apparent distress      [] Abnormal -     Mental status: [x] Alert and awake  [x] Oriented to person/place/time [x] Able to follow commands    [] Abnormal -       HENT: [x] Normocephalic, atraumatic  [] Abnormal -   [x] Mouth/Throat: Mucous membranes are moist    External Ears [x] Normal  [] Abnormal -    Neck: [x] No visualized mass [] Abnormal -     Pulmonary/Chest: [x] Respiratory effort normal   [x] No visualized signs of difficulty breathing or respiratory distress        [] Abnormal -      Musculoskeletal:   [x] Normal gait with no signs of ataxia         [x] Normal range of motion of neck        [] Abnormal -     Neurological:        [x] No Facial Asymmetry (Cranial nerve 7 motor function) (limited exam due to video visit)          [x] No gaze palsy        [] Abnormal -          Skin:        [x] No significant exanthematous lesions or discoloration noted on facial skin         [] Abnormal -            Psychiatric:       [x] Normal Affect [] Abnormal -        [x] No Hallucinations    Other pertinent observable physical exam findings:-        We discussed the expected course, resolution and complications of the diagnosis(es) in detail. Medication risks, benefits, costs, interactions, and alternatives were discussed as indicated. I advised her to contact the office if her condition worsens, changes or fails to improve as anticipated. She expressed understanding with the diagnosis(es) and plan. Cristina Talbert, who was evaluated through a patient-initiated, synchronous (real-time) audio-video encounter, and/or her healthcare decision maker, is aware that it is a billable service, with coverage as determined by her insurance carrier. She provided verbal consent to proceed: Yes, and patient identification was verified.  It was conducted pursuant to the emergency declaration under the [facility], [address] [place] [address] authority and the [employer] and [employer] Act. A caregiver was present when appropriate. Ability to conduct physical exam was limited. I was in the office. The patient was at home.       Consuella Angelucci, MD

## 2020-08-12 NOTE — PROGRESS NOTES
This is the Subsequent Medicare Annual Wellness Exam, performed 12 months or more after the Initial AWV or the last Subsequent AWV    I have reviewed the patient's medical history in detail and updated the computerized patient record. History     Patient Active Problem List   Diagnosis Code    Pure hypercholesterolemia E78.00    Essential hypertension, benign I10    Hematuria, unspecified R31.9    Diffuse cystic mastopathy N60.19    Osteopenia M85.80    MVP (mitral valve prolapse) I34.1    Mitral regurgitation I34.0    CAD (coronary artery disease), native coronary artery I25.10    Back pain, thoracic M54.6    Hyponatremia N90.1    Diastolic heart failure (HCC) I50.30    Cardiomyopathy (HCC) I42.9    Elevated liver enzymes R74.8    Atrial myxoma D15.1    Elevated brain natriuretic peptide (BNP) level R79.89    Alcohol abuse F10.10    Pneumonia J18.9    Paroxysmal atrial fibrillation (HCC) I48.0     Past Medical History:   Diagnosis Date    Alcohol abuse     2/25/16 phone note    Anasarca 10/18/13    per CT. Dr. Molly Mota.  Atrial myxoma 06/06/14    Dr. Christal Cordova.  CAD (coronary artery disease)     Diastolic heart failure ([facility] [address] 10/15/13    Dr. Angie Heredia.  Diverticulosis 12/27/11    Dr. Jolanta Burleson.  DJD (degenerative joint disease) of knee     Dr. Nolvia Sher Elevated cholesterol     Elevated liver enzymes 09/2013    AST, ALT, Alk P    Heartburn     Hepatic steatosis 10/18/13    Hypertension     Low sodium 06/06/14    admitted to [facility]    Migraine     MVP (mitral valve prolapse)     w/MR  Dr. Neli Finley. Dr. Aline Hodgkins. Dr. Christal Cordova.  Osteopenia 03/04/08    Ovarian cyst, bilateral     PAF (paroxysmal atrial fibrillation) ([facility] [address] 03/21/11    New. Dr. Sarah Varghese. Dr. Aline Hodgkins. Dr. Melrose Bloch Severe mitral regurgitation by prior echocardiogram 03/21/11    Dr. Amara Browne.   Dr. Melrose Bloch Uterine fibroid       Past Surgical History:   Procedure Laterality Date  CARDIAC SURG PROCEDURE UNLIST  5/23/2011    CABG x 1 vessel, MV REPAIR. Dr. Curry Dean.  COLONOSCOPY  2006, 12/27/11    Dr. Yesenia Lopez. due q 5-10 yrs    HX BUNIONECTOMY  2000, 1990's    Rt 2000, Lt 1990s    HX CYST INCISION AND DRAINAGE      HX HEART CATHETERIZATION  04/12/11    Dr. Dandy Small    HX HYSTEROSCOPY WITH ENDOMETRIAL ABLATION  11/2004    HX KNEE ARTHROSCOPY Left     HX MOHS PROCEDURES  1998 L, 2001 R    HX MYOMECTOMY  11/2004    HX OVARIAN CYST REMOVAL Right 2004    HX WOLFGANG AND BSO  2005    HX TUBAL LIGATION       Current Outpatient Medications   Medication Sig Dispense Refill    melatonin 3 mg tablet Take 3 mg by mouth.  aspirin 81 mg chewable tablet Take 81 mg by mouth daily. Take 1 tab daily      furosemide (LASIX) 20 mg tablet Take  by mouth daily. 1 tab daily      digoxin (LANOXIN) 0.125 mg tablet Take 0.125 mg by mouth daily. 1 tab daily      pantoprazole (PROTONIX) 40 mg tablet Take 40 mg by mouth daily. 1 tab daily      sildenafil, pulm. hypertension, (REVATIO) 20 mg tablet Take 20 mg by mouth three (3) times daily. 2tabs by mouth 3 times a day      ALPRAZolam (XANAX) 0.25 mg tablet Take 1 Tab by mouth nightly as needed for Anxiety. Max Daily Amount: 0.25 mg. 20 Tab 0    calcium-vitamin D (OYSTER SHELL) 500 mg(1,250mg) -200 unit per tablet Take 1 Tab by mouth two (2) times daily (with meals). 60 Tab 11    rivaroxaban (XARELTO) 15 mg tab tablet Take  by mouth daily.  CITRUS CALCIUM tablet TAKE ONE TABLET BY MOUTH DAILY WITH BREAKFAST 30 Tab 11    atorvastatin (LIPITOR) 40 mg tablet Take 1 Tab by mouth daily. 30 Tab 5    lansoprazole (PREVACID) 30 mg capsule Take 1 Cap by mouth Daily (before breakfast).  Indications: HEARTBURN 30 Cap 5     Allergies   Allergen Reactions    Amoxicillin Diarrhea     1 day after Doxycycline    Doxycycline Diarrhea     100mg single dose \"too strong\" and caused diarrhea       Family History   Problem Relation Age of Onset  Heart Surgery Mother         CABG    Hypertension Mother     High Cholesterol Mother     Cancer Father         bone    Diabetes Sister     High Cholesterol Brother     Cancer Maternal Grandmother      Social History     Tobacco Use    Smoking status: Former Smoker     Packs/day: 0.25     Years: 23.00     Pack years: 5.75     Types: Cigarettes     Last attempt to quit: 2016     Years since quittin.6    Smokeless tobacco: Never Used    Tobacco comment: very rarely cigarettes--no cigarettes X 2 weeks   Substance Use Topics    Alcohol use: No     Alcohol/week: 0.0 standard drinks     Comment: as of 16       Depression Risk Factor Screening:     3 most recent PHQ Screens 2020   Little interest or pleasure in doing things Not at all   Feeling down, depressed, irritable, or hopeless Not at all   Total Score PHQ 2 0       Alcohol Risk Factor Screening:   Do you average 1 drink per night or more than 7 drinks a week:  No    On any one occasion in the past three months have you have had more than 3 drinks containing alcohol:  No      Functional Ability and Level of Safety:   Hearing: Hearing is good. Activities of Daily Living: The home contains: rollator  Patient does total self care     Ambulation: with mild difficulty     Fall Risk:  Fall Risk Assessment, last 12 mths 2020   Able to walk? Yes   Fall in past 12 months?  No     Abuse Screen:  Patient is not abused       Cognitive Screening   Has your family/caregiver stated any concerns about your memory: no    Cognitive Screening: Normal - MMSE (Mini Mental Status Exam)    Patient Care Team   Patient Care Team:  Na Ramirez MD as PCP - General (Internal Medicine)  Lavon Drake MD as Referring Provider (Cardiology)  Kobi Quiros MD (Radiology)  Zafar Meade MD (Colon and Rectal Surgery)  Branden Jimenez MD (Nephrology)    Assessment/Plan   Education and counseling provided:  Are appropriate based on today's review and evaluation  End-of-Life planning (with patient's consent)  Screening Mammography  Bone mass measurement (DEXA)  Screening for glaucoma    Diagnoses and all orders for this visit:    1. Iron deficiency anemia due to chronic blood loss  -     REFERRAL TO HOME HEALTH  -     HGB & HCT  -     REFERRAL TO HEMATOLOGY ONCOLOGY    2. Essential hypertension, benign    3. Coronary artery disease involving native coronary artery of native heart without angina pectoris    4. Pure hypercholesterolemia    5. Cardiomyopathy, unspecified type ([facility] [address]    6. Occult blood positive stool  -     HGB & HCT  -     REFERRAL TO GASTROENTEROLOGY  -     REFERRAL TO HEMATOLOGY ONCOLOGY        Health Maintenance Due   Topic Date Due    Shingrix Vaccine Age 49> (1 of 2) 12/19/2003    Colonoscopy  12/27/2016    GLAUCOMA SCREENING Q2Y  12/19/2018    Pneumococcal 65+ years (2 of 2 - PPSV23) 05/29/2020    Influenza Age 5 to Adult  08/01/2020       Giovani Lewis, who was evaluated through a synchronous (real-time) audio-video encounter, and/or her healthcare decision maker, is aware that it is a billable service, with coverage as determined by her insurance carrier. She provided verbal consent to proceed: Yes, and patient identification was verified. It was conducted pursuant to the emergency declaration under the [facility], [address] [place] [address] authority and the [employer] and [employer] Act. A caregiver was present when appropriate. Ability to conduct physical exam was limited. I was in the office. The patient was at home.     Taylor Randle MD

## 2020-09-16 ENCOUNTER — VIRTUAL VISIT (OUTPATIENT)
Dept: INTERNAL MEDICINE CLINIC | Age: 65
End: 2020-09-16
Payer: MEDICARE

## 2020-09-16 DIAGNOSIS — D59.10 AUTOIMMUNE HEMOLYTIC ANEMIA (HCC): ICD-10-CM

## 2020-09-16 DIAGNOSIS — I49.9 VENTRICULAR ARRHYTHMIA: ICD-10-CM

## 2020-09-16 DIAGNOSIS — I25.10 CORONARY ARTERY DISEASE INVOLVING NATIVE CORONARY ARTERY OF NATIVE HEART WITHOUT ANGINA PECTORIS: Primary | ICD-10-CM

## 2020-09-16 DIAGNOSIS — E87.6 HYPOKALEMIA: ICD-10-CM

## 2020-09-16 DIAGNOSIS — I42.9 CARDIOMYOPATHY, UNSPECIFIED TYPE (HCC): ICD-10-CM

## 2020-09-16 PROCEDURE — 1101F PT FALLS ASSESS-DOCD LE1/YR: CPT | Performed by: INTERNAL MEDICINE

## 2020-09-16 PROCEDURE — G8510 SCR DEP NEG, NO PLAN REQD: HCPCS | Performed by: INTERNAL MEDICINE

## 2020-09-16 PROCEDURE — G8399 PT W/DXA RESULTS DOCUMENT: HCPCS | Performed by: INTERNAL MEDICINE

## 2020-09-16 PROCEDURE — 1111F DSCHRG MED/CURRENT MED MERGE: CPT | Performed by: INTERNAL MEDICINE

## 2020-09-16 PROCEDURE — G8536 NO DOC ELDER MAL SCRN: HCPCS | Performed by: INTERNAL MEDICINE

## 2020-09-16 PROCEDURE — G8756 NO BP MEASURE DOC: HCPCS | Performed by: INTERNAL MEDICINE

## 2020-09-16 PROCEDURE — 1090F PRES/ABSN URINE INCON ASSESS: CPT | Performed by: INTERNAL MEDICINE

## 2020-09-16 PROCEDURE — 3017F COLORECTAL CA SCREEN DOC REV: CPT | Performed by: INTERNAL MEDICINE

## 2020-09-16 PROCEDURE — G8427 DOCREV CUR MEDS BY ELIG CLIN: HCPCS | Performed by: INTERNAL MEDICINE

## 2020-09-16 PROCEDURE — G8420 CALC BMI NORM PARAMETERS: HCPCS | Performed by: INTERNAL MEDICINE

## 2020-09-16 PROCEDURE — 99214 OFFICE O/P EST MOD 30 MIN: CPT | Performed by: INTERNAL MEDICINE

## 2020-09-16 PROCEDURE — G9899 SCRN MAM PERF RSLTS DOC: HCPCS | Performed by: INTERNAL MEDICINE

## 2020-09-16 RX ORDER — AMIODARONE HYDROCHLORIDE 200 MG/1
TABLET ORAL
COMMUNITY
Start: 2020-08-27 | End: 2020-09-16 | Stop reason: SDUPTHER

## 2020-09-16 RX ORDER — AMIODARONE HYDROCHLORIDE 200 MG/1
TABLET ORAL
COMMUNITY
Start: 2020-07-06 | End: 2020-10-29 | Stop reason: ALTCHOICE

## 2020-09-16 RX ORDER — DEXAMETHASONE 1 MG/1
1 TABLET ORAL EVERY 12 HOURS
COMMUNITY

## 2020-09-16 NOTE — PROGRESS NOTES
Cristina Talbert is a 75 y.o. female who was seen by synchronous (real-time) audio-video technology on 9/16/2020 for Hospital Follow Up (heart problems); Hypertension; Cardiomyopathy; and CHF        Assessment & Plan:   Diagnoses and all orders for this visit:    1. Coronary artery disease involving native coronary artery of native heart without angina pectoris    Stable. On Lipitor. Not taking any blood thinners right now. Doing well. -     NH DISCHARGE MEDS RECONCILED W/ CURRENT OUTPATIENT MED LIST    2. Cardiomyopathy, unspecified type ([address]    Ejection fraction 30 to 35% right now with  dilated ventricle. Has frequent arrhythmia. She is going to have a defibrillator placed in 2 weeks at [facility] by Dr. Marysol Anaya. She is off of Xarelto. -     NH DISCHARGE MEDS RECONCILED W/ CURRENT OUTPATIENT MED LIST    3. Ventricular arrhythmia  On amiodarone 200 mg once a day. Off of digoxin. Not on blood thinner right now. Seeing Dr. Isabelle Song. 4. Hypokalemia  Probably triggered arrhythmia. Potassium replenished. 5. Autoimmune hemolytic anemia (HCC)    On Decadron. Doing well. I spent at least 25 minutes on this visit with this established patient. Subjective:     Saba Rodriguez is here for hospital follow-up. She had multiple ER visits and recently she was admitted to [facility]'s when her LifeVest kicked in. Had 3 episodes of ventricular arrhythmia, she was found to have hypokalemia. Potassium level replenished. She was admitted to hospitals. Was found to have autoimmune hemolytic anemia. Placed on Decadron. COVID was negative. Echocardiogram done, showed ejection fraction 30 to 35% with dilated ventricle with global hypokinesia. She is a good candidate for defibrillator. Plan to place defibrillator in 2 weeks. Doing well right now. Medications are reviewed by me. Her medical record was obtained from [facility] website.   Prior to Admission medications    Medication Sig Start Date End Date Taking? Authorizing Provider   amiodarone (CORDARONE) 200 mg tablet Take  by mouth. 7/6/20  Yes Provider, Historical   dexAMETHasone (DECADRON) 1 mg tablet Take 1 mg by mouth every twelve (12) hours. Yes Provider, Historical   pantoprazole (PROTONIX) 40 mg tablet Take 40 mg by mouth daily. 1 tab daily   Yes Provider, Historical   calcium-vitamin D (OYSTER SHELL) 500 mg(1,250mg) -200 unit per tablet Take 1 Tab by mouth two (2) times daily (with meals). 7/24/19  Yes Annie Doyle MD   atorvastatin (LIPITOR) 40 mg tablet Take 1 Tab by mouth daily. 9/12/17  Yes Curtis Nguyễn MD   amiodarone (CORDARONE) 200 mg tablet  8/27/20 9/16/20  Provider, Historical   melatonin 3 mg tablet Take 3 mg by mouth. Provider, Historical   aspirin 81 mg chewable tablet Take 81 mg by mouth daily. Take 1 tab daily    Provider, Historical   furosemide (LASIX) 20 mg tablet Take  by mouth daily. 1 tab daily    Provider, Historical   sildenafil, pulm. hypertension, (REVATIO) 20 mg tablet Take 20 mg by mouth three (3) times daily. 2tabs by mouth 3 times a day    Provider, Historical   digoxin (LANOXIN) 0.125 mg tablet Take 0.125 mg by mouth daily. 1 tab daily  9/16/20  Provider, Historical   ALPRAZolam (XANAX) 0.25 mg tablet Take 1 Tab by mouth nightly as needed for Anxiety. Max Daily Amount: 0.25 mg. 11/13/19   Annie Doyle MD   rivaroxaban [facility]) 15 mg tab tablet Take  by mouth daily. Provider, Historical   CITRUS CALCIUM tablet TAKE ONE TABLET BY MOUTH DAILY WITH BREAKFAST 5/7/18   Annie Doyle MD   lansoprazole (PREVACID) 30 mg capsule Take 1 Cap by mouth Daily (before breakfast). Indications: HEARTBURN 11/4/16   Curtis Nguyễn MD     Past Medical History:   Diagnosis Date    Alcohol abuse     2/25/16 phone note    Anasarca 10/18/13    per CT. Dr. Lexi Zavala.  Atrial myxoma 06/06/14    Dr. Clara Feng.  CAD (coronary artery disease)     Diastolic heart failure [address] 10/15/13    Dr. Andre Sanches.  Diverticulosis 12/27/11    Dr. Luiz Vernon.  DJD (degenerative joint disease) of knee     Dr. Mark Sahni Elevated cholesterol     Elevated liver enzymes 09/2013    AST, ALT, Alk P    Heartburn     Hepatic steatosis 10/18/13    Hypertension     Low sodium 06/06/14    admitted to [facility]    Migraine     MVP (mitral valve prolapse)     w/MR  Dr. Isac Martinez. Dr. Severo Hitch. Dr. Augusto Chris.  Osteopenia 03/04/08    Ovarian cyst, bilateral     PAF (paroxysmal atrial fibrillation) ([address] 03/21/11    New. Dr. Simón Milligan. Dr. Severo Hitch. Dr. Anila Song Severe mitral regurgitation by prior echocardiogram 03/21/11    Dr. María Nicholson.   Dr. Anila Song Uterine fibroid        ROS    Objective:     Patient-Reported Vitals 8/12/2020   Patient-Reported Height 4f8          Constitutional: [x] Appears well-developed and well-nourished [x] No apparent distress      [] Abnormal -     Mental status: [x] Alert and awake  [x] Oriented to person/place/time [x] Able to follow commands    [] Abnormal -       HENT: [x] Normocephalic, atraumatic  [] Abnormal -   [x] Mouth/Throat: Mucous membranes are moist    External Ears [x] Normal  [] Abnormal -    Neck: [x] No visualized mass [] Abnormal -     Pulmonary/Chest: [x] Respiratory effort normal   [x] No visualized signs of difficulty breathing or respiratory distress        [] Abnormal -      Musculoskeletal:   [x] Normal gait with no signs of ataxia         [x] Normal range of motion of neck        [] Abnormal -     Neurological:        [x] No Facial Asymmetry (Cranial nerve 7 motor function) (limited exam due to video visit)          [x] No gaze palsy        [] Abnormal -          Skin:        [x] No significant exanthematous lesions or discoloration noted on facial skin         [] Abnormal -            Psychiatric:       [x] Normal Affect [] Abnormal -        [x] No Hallucinations    Other pertinent observable physical exam findings:-        We discussed the expected course, resolution and complications of the diagnosis(es) in detail. Medication risks, benefits, costs, interactions, and alternatives were discussed as indicated. I advised her to contact the office if her condition worsens, changes or fails to improve as anticipated. She expressed understanding with the diagnosis(es) and plan. Flori Graham, who was evaluated through a patient-initiated, synchronous (real-time) audio-video encounter, and/or her healthcare decision maker, is aware that it is a billable service, with coverage as determined by her insurance carrier. She provided verbal consent to proceed: Yes, and patient identification was verified. It was conducted pursuant to the emergency declaration under the [facility], [address] [place] [address] authority and the [employer] General Act. A caregiver was present when appropriate. Ability to conduct physical exam was limited. I was in the office. The patient was at home.       Will Ly MD

## 2020-09-16 NOTE — PROGRESS NOTES
Health Maintenance Due   Topic Date Due    Shingrix Vaccine Age 49> (1 of 2) 12/19/2003    Colonoscopy  12/27/2016    GLAUCOMA SCREENING Q2Y  12/19/2018    Pneumococcal 65+ years (2 of 2 - PPSV23) 05/29/2020    Flu Vaccine (1) 09/01/2020       Chief Complaint   Patient presents with   [facility] Follow Up     heart problems    Hypertension    Cardiomyopathy    CHF       1. Have you been to the ER, urgent care clinic since your last visit? Hospitalized since your last visit? Yes, heart problems    2. Have you seen or consulted any other health care providers outside of the [address] [place] [address] since your last visit? Include any pap smears or colon screening. No    3) Do you have an Advance Directive on file? Yes    4) Are you interested in receiving information on Advance Directives? NO      Patient is accompanied by self I have received verbal consent from Anamaria Muro to discuss any/all medical information while they are present in the room.

## 2020-09-18 ENCOUNTER — PATIENT OUTREACH (OUTPATIENT)
Dept: CASE MANAGEMENT | Age: 65
End: 2020-09-18

## 2020-09-18 NOTE — PROGRESS NOTES
Ambulatory Care Management Note    Date/Time:  9/18/2020 1:21 PM    This patient was received as a referral from [address] [place] [address] outreached to patient today to offer care management services. Introduction to self and role of care manager provided. Patient accepted care management services at this time. Follow up call not scheduled at this time- ACM attempted to schedule and patient guarded but states she will have her sister contact ACM to discuss her plan of care further. Patient has Ambulatory Care Manager's contact number for for any questions or concerns. ACM spoke very briefly with the patient. She was quite guarded and reluctant to answer this writers questions. Recent admissions -  6/23-6/26/20 [address]- flown to [facility] as transfer  6/26/20-7/11/20 [facility]   7/21-7/24/20 [address]   8/13-8/27/20 [address]  8/31-9/9/20 [address]    Top Concerns-   Hypoglycemia-   Reports she is feeling fairly well and has not had any episodes of sweating, dizziness, feeling faint or any other concerns. Patient reported she had not had any education on hypoglycemia symptoms despite having episodes of severe hypoglycemia during admissions detected at the time of 2 arrests. Blood sugars during those episodes were in the range of 10-30. She reports she has a family member who is an RN and suggested she get a BS meter to check her BS's and ACM agreed this would be important especially if she felt symptomatic. She reports just needing to get a battery for the meter. Reviewed with her sx's of hypoglycemia. She may also benefit from further education on this as well as having glucagon on hand in case of hypoglycemia. While admitted to both [facility] and [facility] systems was referred to endocrinology as O/P for follow up but she reports she has not seen anyone with endocrine as and outpatient and would like to verify with Dr Zari Anand before considering. Provided information for Dr Vesta Conway per previous d/c instructions.      Cardiomyopathy/ CHF- EF on last echo had improved to 30-35% from 10% on echo in June 2020. Patient continues wearing life vest. Patient reports she is going to be admitted to [address] [place] [address] on Wednesday 9/23/20 for ICD placement (per notes by Dr Bella Tillman). She had PICC line removed prior to hospital discharge on 9/9/20 and Dobutamine infusion stopped. Patient reports plans to follow up with DR Rosales Landry after upcoming procedure. No c/o chest pain or SOB today. Reports she is getting around well / uses her walker at times and is caring for her mother. [facility] attempted to review med rec with the patient but patient declined stating it has already been reviewed with Dr. Margy Roberts. [facility] explained that [facility] offers the [facility] program (home palliative visits) where a provider could visit her home and help with chronic disease support/ sx management . Patient states will d/w sister and let ACM know at a later time. ACP- patient reports has done an advance directive and it is being notarized. States her sister has a copy . Requested she bring to next PCP appt to be filed. Barriers  - Noted that at [facility] there was confusion around code status, patient elected DNR while speaking with the hospitalist and then arrested, family requested CPR and it was performed. On subsequent conversation attempts around ACP patient did not proceed with creating ACP or discussion. She states today she has an Advanced care plan but there is some question around whether she is speaking of a medical will vs general POA. AC will plan to clarify with patients sister.    - Patient is a poor historian of recent medical events and plan of care/ reluctant to review plan of care/ medications etc. Will verify with patients sister whether she is assisting with medication management.      - Patient is guarded in discussions around plan of care and/ or goal setting and defers to her sister- states Dr Rosales Landry always speaks with her sister- did give verbal permission for [facility] to speak with her sister about her plan of care and will ask her sister to contact this writer for further discussion.     - Fragmented care between health systems. Over the last 4 months patient hospitalized repeatedly at [facility] and [facility] however PCP is within [facility]. Management by 3 healthsystems. Consider having patient try to streamline to one system where possible. - Patient lives alone with 80yr old mother and provides her care. Reports today no HH coming and ACM will follow up on this as it appeared [facility] was ordered at last d/c but hard to be sure given difficulty finding all [facility]. Pt reports she feels she is doing well with walker without PT/OT and at times doesn't use the walker. CM offered admission to [facility] in the past but she couldn't go as she was on Dobutamine infusion. Plan -  ACM will await call from patients sister for follow up and will try to reach out to her if no return call in a week or so. ACM will forward this note to PCP for coordination of care.

## 2020-09-29 ENCOUNTER — PATIENT OUTREACH (OUTPATIENT)
Dept: CASE MANAGEMENT | Age: 65
End: 2020-09-29

## 2020-09-29 NOTE — PROGRESS NOTES
BRITTANY attempted to reach patients sister Soraya for CM followup. Soraya requested that this writer call back later this afternoon after 3pm. Will return call later this afternoon to complete assessment and develop a plan of care with patients . 3:15 pm  BRITTANY again attempted to reach patients sister Soraya for CM . She apologized that again she was busy and unable to take the call. She requested a return call from [facility] tomorrow noting she will be available all day. BRITTANY will return call tomorrow for further assessment/ CM.

## 2020-09-30 ENCOUNTER — PATIENT OUTREACH (OUTPATIENT)
Dept: CASE MANAGEMENT | Age: 65
End: 2020-09-30

## 2020-09-30 NOTE — PROGRESS NOTES
Per [facility]'s discussion with patients sister Kathryn Rodriguez yesterday to call her back today for further follow up / CM on the patient, [facility] contacted patients sister today. Unable to reach her and left  for return call with contact information provided. If no return call by the start of next week , [facility] will attempt to reach again early next week.

## 2020-10-05 DIAGNOSIS — M85.89 OSTEOPENIA OF MULTIPLE SITES: ICD-10-CM

## 2020-10-05 RX ORDER — LANOLIN ALCOHOL/MO/W.PET/CERES
CREAM (GRAM) TOPICAL
Qty: 60 TAB | Refills: 10 | Status: SHIPPED | OUTPATIENT
Start: 2020-10-05

## 2020-10-06 ENCOUNTER — PATIENT OUTREACH (OUTPATIENT)
Dept: CASE MANAGEMENT | Age: 65
End: 2020-10-06

## 2020-10-06 NOTE — PROGRESS NOTES
ACM contacted Yarely Sarkar RN CM at [facility] at home for CM to [place] coordination of care for the patient. ACM provided to Yarely Sarkar history obtained from recent hospitalization chart reviews and goals of care/ gaps in care noted. Discussed that this ACM had a guarded interaction initially with the patient and she was somewhat of a poor historian and deferred to her sister. This ACM has not had success in reaching her sister since on multiple attempts. Most noted concerns for plan of care noted were her need for endocrinology follow re hypoglycemia during several admissions with recs for o/p endocrine follow up, code status and ACP conversation , as well as fragmentation of her care between multiple health systems. Yarely Sarkar noted all CM's concerns and will assign a direct CM to the patient and provide them this ACM's contact info for coordination. Both parties agreed that this ACM will again attempt to reach the patient for further follow up and care planning and the [facility] CM will attempt to contact patient for follow up also. ACM will attempt another outreach to the patients sister tomorrow for further CM assistance.

## 2020-10-07 ENCOUNTER — PATIENT OUTREACH (OUTPATIENT)
Dept: CASE MANAGEMENT | Age: 65
End: 2020-10-07

## 2020-10-07 NOTE — PROGRESS NOTES
DEMETRIO contacted patients sister Helena Boast for CM followup. She requests a call back in 1-2 hours . ACM will attempt to reach her this afternoon. 3 pm   BRITTANY contacted patients daughter for CM discussion. She apologized and asked to call [facility] back this week when convenient provided [facility]'s number for return call.  She states she will call back between today and Friday for further conversation

## 2020-10-14 ENCOUNTER — PATIENT OUTREACH (OUTPATIENT)
Dept: CASE MANAGEMENT | Age: 65
End: 2020-10-14

## 2020-10-14 RX ORDER — ZOLPIDEM TARTRATE 5 MG/1
5 TABLET ORAL
COMMUNITY
End: 2021-05-14 | Stop reason: ALTCHOICE

## 2020-10-14 NOTE — PROGRESS NOTES
Ambulatory Care Management Note    Date/Time:  10/14/2020 12:04 PM    This Ambulatory Care Manager (ACM) reviewed and updated the following screenings during this call; general assessment, self management assessment, ACP assessment and note, medication reconciliation. Patient's challenges to self management identified:   level of motivation and multi health system providers      Medication Management:  good adherence and good understanding- patient confident that she is only taking a few medications now and that a lot of them were stopped by her cardiologist and [address] , reports she stopped Xanax, Lipitor, Prevacid, Amiodarone, ASA, Xarelto, and Revatio. ACM is unsure if these are all correct and will contact Dr Gabbie Live cardiology office to verify these changes. Advance Care Planning:   Does patient have an Advance Directive:  Patient reports completed ACP with her sister and her sister has a copy . [employer], Referrals, and Durable Medical Equipment: none      Health Maintenance Due   Topic Date Due    Shingrix Vaccine Age 50> (1 of 2) 12/19/2003    Colonoscopy  12/27/2016    GLAUCOMA SCREENING Q2Y  12/19/2018    Pneumococcal 65+ years (2 of 2 - PPSV23) 05/29/2020    Flu Vaccine (1) 09/01/2020     Health Maintenance reviewed - not reviewed on this call will plan to review on next call . Patient was asked to consider health care goals that they would like to focus on with this ACM. ACM will follow up with patient to discuss goals and establish care plan in the next 7-14 days. PCP/Specialist follow up: No future appointments. Patient reports since ICD placed by Dr Suma Coleman she is feeling \"awesome\". She feels she has more energy. She is completing ADL's independently without difficulty. She lives with her mother who is 80 and cooks for her however her mother completes her ADL's independently and she doesn't have to change or lift or bathe her.  She has been going over to her sisters house for showers as she has walk in shower which is easier. She uses the walker when she goes out but doesn't always need it at home. She feels steady and doesn't feel she needs PT and has not had any falls. Reports her pillbox is managed between herself, her niece and her sister. Patient reports numerous meds from list she is no longer taking. [facility] has a call out to Dr Shasha Vyas (cardiology) office to verify meds she should be on . Patient reports has an appt with Dr Shasha Vyas next Thursday and Dr Ozzie Rodriguez for ICD placement follow up on 11/5/20. Discussed with her need for endocrinology follow up due to history of recurrent hypoglycemia during multiple admissions and endocrine recommended outpatient follow up for further workup. Provided info for Dr Germán Valdez again- patient states will d/w Dr Shasha Vyas and if he agrees she should see them she will schedule. [facility] will provide coordination with Dr Shasha Vyas office re referral to endocrine when they return this writers call. Patient states she is busy trying to make lunch and requests that this writer call back in 1-2 weeks. She agrees to consider goals for herself in the interim to discuss on our next call. 4:15 pm  [facility] spoke with Dr Lucila Murrieta . Advised that patient had reportedly stopped  Xanax, Lipitor, Prevacid, Amiodarone, ASA, Xarelto, and Revatio. Also that her d/c paperwork said for her to take Bumex PRN however Lasix PRN is listed on [facility] med rec. Asked that he please advise whether all of these changes were correct and what current meds she should be on . Also discussed concern about hypoglycemia during multiple admissions and patients desire for him to advise on if she should follow up with endocrinology. She will leave a message for him to advise and will return call to [facility] tomorrow to clarify.

## 2020-10-14 NOTE — ACP (ADVANCE CARE PLANNING)
Patient reports she and her sister completed an ACP and she believes her sister Ximena Rouse has copies of it.

## 2020-10-16 ENCOUNTER — PATIENT OUTREACH (OUTPATIENT)
Dept: CASE MANAGEMENT | Age: 65
End: 2020-10-16

## 2020-10-20 ENCOUNTER — PATIENT OUTREACH (OUTPATIENT)
Dept: CASE MANAGEMENT | Age: 65
End: 2020-10-20

## 2020-10-21 NOTE — PROGRESS NOTES
DEMETRIOM attempted another contact with Starla Higashi Dr Claude Cushing (cardiology) nurse. Left a message for return call to clarify patients medications. ACM spoke with Starla Higashi Dr Claude Cushing (cardiology nurse ). Verified he wanted her to stop Amiodarone, ASA, Lasix, Xarelto and Revatio. She states that he verified she should be taking Lipitor, Protonix, Bumex PRN, Calcium plus vitamin D, Dexamethasone and Ambien. ACM verified with patient to continue Lipitor per Dr Claude Cushing as she thought she should stop Lipitor. Also, that Dr Claude Cushing agreed she needs to see an outpatient endocrinologist. [facility] to her for Dr Rosita Gao. [facility] will also forward this note to PCP Dr Caridad Guillen for     Removal of the following from medication reconciliation-     -amiodarone  -ASA  -Lasix  -Xarelto   -Revatio     Please ADD-   Bumex PRN edema - 1 mg     Provided information for patient for the 3rd time for Dr Rosita Gao to call and schedule for endocrinology follow up.
Clear

## 2020-10-29 ENCOUNTER — VIRTUAL VISIT (OUTPATIENT)
Dept: INTERNAL MEDICINE CLINIC | Age: 65
End: 2020-10-29
Payer: MEDICARE

## 2020-10-29 ENCOUNTER — PATIENT OUTREACH (OUTPATIENT)
Dept: CASE MANAGEMENT | Age: 65
End: 2020-10-29

## 2020-10-29 DIAGNOSIS — I25.10 CORONARY ARTERY DISEASE INVOLVING NATIVE CORONARY ARTERY OF NATIVE HEART WITHOUT ANGINA PECTORIS: ICD-10-CM

## 2020-10-29 DIAGNOSIS — I49.9 VENTRICULAR ARRHYTHMIA: ICD-10-CM

## 2020-10-29 DIAGNOSIS — L85.3 DRY SKIN: Primary | ICD-10-CM

## 2020-10-29 DIAGNOSIS — D59.10 AUTOIMMUNE HEMOLYTIC ANEMIA (HCC): ICD-10-CM

## 2020-10-29 PROCEDURE — G8510 SCR DEP NEG, NO PLAN REQD: HCPCS | Performed by: INTERNAL MEDICINE

## 2020-10-29 PROCEDURE — G8536 NO DOC ELDER MAL SCRN: HCPCS | Performed by: INTERNAL MEDICINE

## 2020-10-29 PROCEDURE — G8399 PT W/DXA RESULTS DOCUMENT: HCPCS | Performed by: INTERNAL MEDICINE

## 2020-10-29 PROCEDURE — 3017F COLORECTAL CA SCREEN DOC REV: CPT | Performed by: INTERNAL MEDICINE

## 2020-10-29 PROCEDURE — G8427 DOCREV CUR MEDS BY ELIG CLIN: HCPCS | Performed by: INTERNAL MEDICINE

## 2020-10-29 PROCEDURE — G8420 CALC BMI NORM PARAMETERS: HCPCS | Performed by: INTERNAL MEDICINE

## 2020-10-29 PROCEDURE — 1090F PRES/ABSN URINE INCON ASSESS: CPT | Performed by: INTERNAL MEDICINE

## 2020-10-29 PROCEDURE — G8756 NO BP MEASURE DOC: HCPCS | Performed by: INTERNAL MEDICINE

## 2020-10-29 PROCEDURE — 1101F PT FALLS ASSESS-DOCD LE1/YR: CPT | Performed by: INTERNAL MEDICINE

## 2020-10-29 PROCEDURE — G9899 SCRN MAM PERF RSLTS DOC: HCPCS | Performed by: INTERNAL MEDICINE

## 2020-10-29 PROCEDURE — 99214 OFFICE O/P EST MOD 30 MIN: CPT | Performed by: INTERNAL MEDICINE

## 2020-10-29 RX ORDER — AMMONIUM LACTATE 12 G/100G
LOTION TOPICAL
Qty: 1 BOTTLE | Refills: 2 | Status: SHIPPED | OUTPATIENT
Start: 2020-10-29

## 2020-10-29 RX ORDER — TRAZODONE HYDROCHLORIDE 50 MG/1
TABLET ORAL
COMMUNITY
Start: 2020-10-26 | End: 2021-05-14 | Stop reason: SDUPTHER

## 2020-10-29 RX ORDER — BUMETANIDE 1 MG/1
TABLET ORAL
COMMUNITY
Start: 2020-10-16

## 2020-10-29 NOTE — PROGRESS NOTES
Sarah Arias is a 75 y.o. female who was seen by synchronous (real-time) audio-video technology on 10/29/2020 for Dry Skin (legs, back, excessive dry skin) and Irregular Heart Beat        Assessment & Plan:   Diagnoses and all orders for this visit:    1. Dry skin    She is on water pill, might have dry skin because of dehydration. Advised her to drink more fluid. Will give,  -     ammonium lactate (LAC-HYDRIN) 12 % lotion; rub in to affected area well    2. Coronary artery disease involving native coronary artery of native heart without angina pectoris    Stable. Lipitor not taking  aspirin.  -     METABOLIC PANEL, COMPREHENSIVE  -     LIPID PANEL    3. Ventricular arrhythmia    At atrial fibrillation. Recently pacemaker placed [facility].  Doing well. Off of aspirin.  -     METABOLIC PANEL, COMPREHENSIVE    4. Autoimmune hemolytic anemia    On Decadron every third day. Seeing specialist  Michael Queen . Will be in Decadron every  5th day. Will check,  -     CBC WITH AUTOMATED DIFF        I spent at least 25 minutes on this visit with this established patient. Subjective:   Ms. Linda Elaine is here for follow-up. Report a lot of dry skin all over her body. He thinks she is drinking more fluid. No rash noticed. Has a paroxysmal atrial fibrillation. Recently she had pacemaker placed. She is off of aspirin. Feeling good. No palpitation. Has ischemic cardiomyopathy. Has CHF, on Bumex. No shortness of breath orthopnea. Had autoimmune hemolytic anemia. On Decadron tapering dosage. Seeing specialist.  No recent labs done. Flu shot up-to-date. Prior to Admission medications    Medication Sig Start Date End Date Taking?  Authorizing Provider   bumetanide (BUMEX) 1 mg tablet  10/16/20  Yes Provider, Historical   traZODone (DESYREL) 50 mg tablet  10/26/20  Yes Provider, Historical   ammonium lactate (LAC-HYDRIN) 12 % lotion rub in to affected area well 10/29/20  Yes Mateo Sánchez MD zolpidem (AMBIEN) 5 mg tablet 5 mg. Yes Provider, Historical   Oyster Shell Calcium-Vit D3 500 mg(1,250mg) -200 unit per tablet TAKE ONE TABLET BY MOUTH TWICE A DAY WITH MEALS 10/5/20  Yes Perlita Duong MD   dexAMETHasone (DECADRON) 1 mg tablet Take 1 mg by mouth every twelve (12) hours. Yes Provider, Historical   pantoprazole (PROTONIX) 40 mg tablet Take 40 mg by mouth daily. 1 tab daily   Yes Provider, Historical   ALPRAZolam (XANAX) 0.25 mg tablet Take 1 Tab by mouth nightly as needed for Anxiety. Max Daily Amount: 0.25 mg. 11/13/19  Yes Perlita Duong MD   CITRUS CALCIUM tablet TAKE ONE TABLET BY MOUTH DAILY WITH BREAKFAST  Patient taking differently: 1 Tab. 5/7/18  Yes Perlita Duong MD   atorvastatin (LIPITOR) 40 mg tablet Take 1 Tab by mouth daily. 9/12/17  Yes Sheree García MD   amiodarone (CORDARONE) 200 mg tablet Take  by mouth. 7/6/20 10/29/20  Provider, Historical   melatonin 3 mg tablet Take 3 mg by mouth. 10/29/20  Provider, Historical   aspirin 81 mg chewable tablet Take 81 mg by mouth daily. Take 1 tab daily  10/29/20  Provider, Historical   furosemide (LASIX) 20 mg tablet Take  by mouth daily. 1 tab daily as needed for swelling  10/29/20  Provider, Historical   sildenafil, pulm. hypertension, (REVATIO) 20 mg tablet Take 20 mg by mouth three (3) times daily. 2tabs by mouth 3 times a day  10/29/20  Provider, Historical   rivaroxaban (XARELTO) 15 mg tab tablet Take  by mouth daily. 10/29/20  Provider, Historical   lansoprazole (PREVACID) 30 mg capsule Take 1 Cap by mouth Daily (before breakfast). Indications: HEARTBURN 11/4/16 10/29/20  Sheree García MD     Past Medical History:   Diagnosis Date    Alcohol abuse     2/25/16 phone note    Anasarca 10/18/13    per CT. Dr. Chasidy Turner.  Atrial myxoma 06/06/14    Dr. Vik Mead.  CAD (coronary artery disease)     Diastolic heart failure ([facility] [address] [address] 10/15/13    Dr. Desmond Brooks.  Diverticulosis 12/27/11    Dr. Ramin Cintron.     DJD (degenerative joint disease) of knee     Dr. Sj Lockwood Elevated cholesterol     Elevated liver enzymes 09/2013    AST, ALT, Alk P    Heartburn     Hepatic steatosis 10/18/13    Hypertension     Low sodium 06/06/14    admitted to [facility]    Migraine     MVP (mitral valve prolapse)     w/MR  Dr. Deborah Alatorre. Dr. Shmuel Gomez. Dr. Marie Florence.  Osteopenia 03/04/08    Ovarian cyst, bilateral     PAF (paroxysmal atrial fibrillation) ([facility] [address] 03/21/11    New. Dr. Kirsty Weems. Dr. Shmuel Gomez. Dr. Lillian Cooper Severe mitral regurgitation by prior echocardiogram 03/21/11    Dr. Oumou Cedeno. Dr. Lillian Cooper Uterine fibroid        ROS significant for dry skin. Objective:     Patient-Reported Vitals 10/29/2020   Patient-Reported Height -   Patient-Reported Pulse 95   Patient-Reported Systolic  908   Patient-Reported Diastolic 88            Constitutional: [x] Appears well-developed and well-nourished [x] No apparent distress      [] Abnormal -     Mental status: [x] Alert and awake  [x] Oriented to person/place/time [x] Able to follow commands    [] Abnormal -        HENT: [x] Normocephalic, atraumatic  [] Abnormal -   [x] Mouth/Throat: Mucous membranes are moist    External Ears [x] Normal  [] Abnormal -    Neck: [x] No visualized mass [] Abnormal -     Pulmonary/Chest: [x] Respiratory effort normal   [x] No visualized signs of difficulty breathing or respiratory distress        [] Abnormal -      Musculoskeletal:   [x] Normal gait with no signs of ataxia         [x] Normal range of motion of neck        [] Abnormal -     Neurological:        [x] No Facial Asymmetry (Cranial nerve 7 motor function) (limited exam due to video visit)          [x] No gaze palsy        [] Abnormal -          Skin:  Dry scaly skin. No rash noticed.                  Psychiatric:       [x] Normal Affect [] Abnormal -        [x] No Hallucinations    Other pertinent observable physical exam findings:-        We discussed the expected course, resolution and complications of the diagnosis(es) in detail. Medication risks, benefits, costs, interactions, and alternatives were discussed as indicated. I advised her to contact the office if her condition worsens, changes or fails to improve as anticipated. She expressed understanding with the diagnosis(es) and plan. Tomasa Rich, who was evaluated through a patient-initiated, synchronous (real-time) audio-video encounter, and/or her healthcare decision maker, is aware that it is a billable service, with coverage as determined by her insurance carrier. She provided verbal consent to proceed: Yes, and patient identification was verified. It was conducted pursuant to the emergency declaration under the [address] [place] [address], [address] [place] [address] authority and the [employer] Act. A caregiver was present when appropriate. Ability to conduct physical exam was limited. I was in the office. The patient was at home.       Keya Diallo MD

## 2020-10-29 NOTE — PROGRESS NOTES
Health Maintenance Due   Topic Date Due    Shingrix Vaccine Age 49> (1 of 2) 12/19/2003    Colorectal Cancer Screening Combo  12/27/2016    GLAUCOMA SCREENING Q2Y  12/19/2018    Pneumococcal 65+ years (2 of 2 - PPSV23) 05/29/2020    Flu Vaccine (1) 09/01/2020       Chief Complaint   Patient presents with    Dry Skin     legs, back, excessive dry skin       1. Have you been to the ER, urgent care clinic since your last visit? Hospitalized since your last visit? No    2. Have you seen or consulted any other health care providers outside of the [address] [place] [address] since your last visit? Include any pap smears or colon screening. No    3) Do you have an Advance Directive on file? no    4) Are you interested in receiving information on Advance Directives? NO      Patient is accompanied by self I have received verbal consent from Sadie Ko to discuss any/all medical information while they are present in the room.

## 2020-11-02 ENCOUNTER — TELEPHONE (OUTPATIENT)
Dept: INTERNAL MEDICINE CLINIC | Age: 65
End: 2020-11-02

## 2020-11-02 NOTE — TELEPHONE ENCOUNTER
Patient called stating that she feels that her right breast is heavier than her left breast. She says that she has been laying on her right side more since getting a pacemaker. Please call her back at [phone]

## 2020-11-04 NOTE — TELEPHONE ENCOUNTER
Call placed to pt- she is going to see surgeon tomorrow for f/u and will have them assess.  She deinies  any bleeding, drainage from the area

## 2020-11-12 ENCOUNTER — PATIENT OUTREACH (OUTPATIENT)
Dept: CASE MANAGEMENT | Age: 65
End: 2020-11-12

## 2020-11-12 NOTE — PROGRESS NOTES
Goals Addressed                 This Visit's Progress     patient will see endocrinology as recommended (pt-stated)        11/12/20- Patient reports she is weaning off of Decadron per Dr Linda Mead she is currently on 1 tab every 5 days and then will go to 1 tab every 6 days and then stop . After this process she will call and schedule to go see Dr Luisana Fisher per Dr Linda Mead. ACM will follow up in 2 weeks or so to see if she has weaned off yet and schedule with Dr Luisana Fisher. LN    10/29/20- Patient reports she has info as provided for Dr Luisana Fisher. Per dr Linda Mead, her decadron is being reduced. He would like her to see Dr Luisana Fisher in 6 weeks for further workup. Pt plans to schedule this. ACM will verify with her in the next 3 weeks or so that she has scheduled this visit. LN       Returns to baseline activity level. 11/12/20- patient reports in a holding pattern for increasing activity at the moment. She recently went to a doctors office and had to walk a very long distance to get into the building and into his office. Along the way she got very very tired and her HR began to race and she had to go to [facility] to have a procedure to see if she had any \"blockages near her pacemaker causing her HR to go up\". Due to this her cardiologist sent her to Dr Kat Peterson and anticoagulation recommended but patient declined as it has caused a lot of nosebleeds in the past when she took it. Per the patient Dr Linda Mead is recommending a new medication for her to her sister today but she is unsure what it is. In reviewing [facility] records all that is noted is a scheduled cardiac cath that is documented was not carried out. ACM will follow up with Dr Alexander Walker for details of plan of care. LN       10/29/20- Patient voices a desire to return to baseline level of strength. She has had several significant recent cardiac events and would like to begin exercise/ strengthening to return to her baseline level of strength.  She will d/w  Amrik Hollingsworth if cardiac rehab would be a good option or if she can begin to exercise and at what capacity. ACM will follow up in 2 weeks . LN          1:32 pm -  ACM left message with Cuca Hollingsworth nurse to verify any medication changes and her plan of care as she has a bit of difficulty providing a full and accurate history of procedure/ outcome/ med changes / recommendations etc.   ACM will await return call for update. Will plan to follow up with patient in 2 weeks.

## 2020-11-13 ENCOUNTER — PATIENT OUTREACH (OUTPATIENT)
Dept: CASE MANAGEMENT | Age: 65
End: 2020-11-13

## 2020-11-13 NOTE — PROGRESS NOTES
DEMETRIOM received return call from Dr Mario Adams. She states that the patient has had recurrent A flutter even with pacer/ICD. She is due to have a RAKESH- they attempted one but probe was too small as her esophagus is small. She needs to have it done with a pediatric probe and this is being scheduled. After it is complete they will consider adding Amiodarone on. ACM will follow.

## 2020-11-17 LAB — SARS-COV-2, NAA: NEGATIVE

## 2020-12-08 ENCOUNTER — PATIENT OUTREACH (OUTPATIENT)
Dept: CASE MANAGEMENT | Age: 65
End: 2020-12-08

## 2020-12-08 NOTE — PROGRESS NOTES
ACM attempted to reach patient for CM Follow up. Reviewed [facility] EMR, noted that patient suffered a clot in [place] and had an arrest requiring 11 mins of CPR and suffered some anoxic injury. Yesterday family decided to proceed with extubation and patient entered hospice care. ACM will send note to PCP for care coordination and will close CM episode.

## 2021-01-28 ENCOUNTER — OFFICE VISIT (OUTPATIENT)
Dept: URGENT CARE | Age: 66
End: 2021-01-28
Payer: MEDICARE

## 2021-01-28 VITALS
RESPIRATION RATE: 17 BRPM | OXYGEN SATURATION: 99 % | HEART RATE: 126 BPM | SYSTOLIC BLOOD PRESSURE: 133 MMHG | DIASTOLIC BLOOD PRESSURE: 84 MMHG | TEMPERATURE: 98.5 F

## 2021-01-28 DIAGNOSIS — R00.0 TACHYCARDIA: ICD-10-CM

## 2021-01-28 DIAGNOSIS — Z20.822 ENCOUNTER FOR LABORATORY TESTING FOR COVID-19 VIRUS: Primary | ICD-10-CM

## 2021-01-28 PROCEDURE — 3017F COLORECTAL CA SCREEN DOC REV: CPT | Performed by: EMERGENCY MEDICINE

## 2021-01-28 PROCEDURE — G8752 SYS BP LESS 140: HCPCS | Performed by: EMERGENCY MEDICINE

## 2021-01-28 PROCEDURE — G8399 PT W/DXA RESULTS DOCUMENT: HCPCS | Performed by: EMERGENCY MEDICINE

## 2021-01-28 PROCEDURE — 1101F PT FALLS ASSESS-DOCD LE1/YR: CPT | Performed by: EMERGENCY MEDICINE

## 2021-01-28 PROCEDURE — G8536 NO DOC ELDER MAL SCRN: HCPCS | Performed by: EMERGENCY MEDICINE

## 2021-01-28 PROCEDURE — G8432 DEP SCR NOT DOC, RNG: HCPCS | Performed by: EMERGENCY MEDICINE

## 2021-01-28 PROCEDURE — 1090F PRES/ABSN URINE INCON ASSESS: CPT | Performed by: EMERGENCY MEDICINE

## 2021-01-28 PROCEDURE — 99203 OFFICE O/P NEW LOW 30 MIN: CPT | Performed by: EMERGENCY MEDICINE

## 2021-01-28 PROCEDURE — G9899 SCRN MAM PERF RSLTS DOC: HCPCS | Performed by: EMERGENCY MEDICINE

## 2021-01-28 PROCEDURE — G8754 DIAS BP LESS 90: HCPCS | Performed by: EMERGENCY MEDICINE

## 2021-01-28 PROCEDURE — G8421 BMI NOT CALCULATED: HCPCS | Performed by: EMERGENCY MEDICINE

## 2021-01-28 PROCEDURE — G8427 DOCREV CUR MEDS BY ELIG CLIN: HCPCS | Performed by: EMERGENCY MEDICINE

## 2021-01-28 NOTE — PROGRESS NOTES
Pt here with known COVID exposure 6 days ago but no Sx. They are here for screening test. This patient was seen in Flu Clinic at [address] [place] [address] Urgent Care while outdoors at their vehicle due to COVID-19 pandemic with PPE and focused examination in order to decrease community viral transmission    The history is provided by the patient. Past Medical History:   Diagnosis Date    Alcohol abuse     2/25/16 phone note    Anasarca 10/18/13    per CT. Dr. José Rosario.  Atrial myxoma 06/06/14    Dr. Eugene Zhong.  CAD (coronary artery disease)     Diastolic heart failure ([facility] [address] 10/15/13    Dr. Divina Torres.  Diverticulosis 12/27/11    Dr. Flavio Rodriguez.  DJD (degenerative joint disease) of knee     Dr. Moises Carrasco Elevated cholesterol     Elevated liver enzymes 09/2013    AST, ALT, Alk P    Heartburn     Hepatic steatosis 10/18/13    Hypertension     Low sodium 06/06/14    [facility]    Migraine     MVP (mitral valve prolapse)     w/MR  Dr. Kunal Chu. Dr. Luis Cid. Dr. Eugene Zhong.  Osteopenia 03/04/08    Ovarian cyst, bilateral     PAF (paroxysmal atrial fibrillation) ([facility] [address] 03/21/11    New. Dr. David Verdin. Dr. Luis Cid. Dr. Marcelo Dozier Severe mitral regurgitation by prior echocardiogram 03/21/11    Dr. Troy Hernandez. Dr. Marcelo Dozier Uterine fibroid         Past Surgical History:   Procedure Laterality Date    COLONOSCOPY  2006, 12/27/11    Dr. Flavio Rodriguez. due q 5-10 yrs    HX BUNIONECTOMY  2000, 1990's    Rt 2000, Lt 1990s    HX CYST INCISION AND DRAINAGE      HX HEART CATHETERIZATION  04/12/11    Dr. Luis Cid    HX HYSTEROSCOPY WITH ENDOMETRIAL ABLATION  11/2004    HX KNEE ARTHROSCOPY Left     HX [facility] L, 2001 R    HX MYOMECTOMY  11/2004    HX OVARIAN CYST REMOVAL Right 2004    HX WOLFGANG AND BSO  2005    HX TUBAL LIGATION      GA CARDIAC SURG PROCEDURE UNLIST  5/23/2011    CABG x 1 vessel, MV REPAIR. Dr. Eugene Zhong.          Family History Problem Relation Age of Onset    Heart Surgery Mother         CABG    Hypertension Mother     High Cholesterol Mother     Cancer Father         bone    Diabetes Sister     High Cholesterol Brother     Cancer Maternal Grandmother         Social History     Socioeconomic History    Marital status: SINGLE     Spouse name: Not on file    Number of children: Not on file    Years of education: Not on file    Highest education level: Not on file   Occupational History    Not on file   Social Needs    Financial resource strain: Not on file    Food insecurity     Worry: Not on file     Inability: Not on file    Transportation needs     Medical: Not on file     Non-medical: Not on file   Tobacco Use    Smoking status: Former Smoker     Packs/day: 0.25     Years: 23.00     Pack years: 5.75     Types: Cigarettes     Quit date: 2016     Years since quittin.0    Smokeless tobacco: Never Used    Tobacco comment: very rarely cigarettes--no cigarettes X 2 weeks   Substance and Sexual Activity    Alcohol use: No     Alcohol/week: 0.0 standard drinks     Comment: as of 14    Drug use: No    Sexual activity: Never     Birth control/protection: Surgical, Abstinence     Comment: has one child.    Lifestyle    Physical activity     Days per week: Not on file     Minutes per session: Not on file    Stress: Not on file   Relationships    Social connections     Talks on phone: Not on file     Gets together: Not on file     Attends Buddhism service: Not on file     Active member of club or organization: Not on file     Attends meetings of clubs or organizations: Not on file     Relationship status: Not on file    Intimate partner violence     Fear of current or ex partner: Not on file     Emotionally abused: Not on file     Physically abused: Not on file     Forced sexual activity: Not on file   Other Topics Concern    Not on file   Social History Narrative    Not on file                ALLERGIES: Amoxicillin and Doxycycline    Review of Systems   Constitutional: Negative for chills, diaphoresis, fatigue and fever. HENT: Negative for congestion, rhinorrhea, sinus pressure, sneezing and sore throat. Respiratory: Negative for cough, chest tightness, shortness of breath and wheezing. Cardiovascular: Negative for chest pain and leg swelling. Gastrointestinal: Negative for abdominal pain, diarrhea, nausea and vomiting. Musculoskeletal: Negative for arthralgias and myalgias. Neurological: Negative for headaches. Vitals:    01/28/21 1557 01/28/21 1627   BP:  133/84   Pulse: (!) 102 (!) 126   Resp: 17    Temp: 98.5 °F (36.9 °C)    SpO2: 99%        Physical Exam  Vitals signs and nursing note reviewed. Constitutional:       General: She is not in acute distress. Appearance: Normal appearance. She is normal weight. She is not ill-appearing or toxic-appearing. Comments: Well appearing and cooperative. NAD   HENT:      Nose: No rhinorrhea. Mouth/Throat:      Mouth: Mucous membranes are moist.      Pharynx: Oropharynx is clear. No oropharyngeal exudate or posterior oropharyngeal erythema. Cardiovascular:      Rate and Rhythm: Regular rhythm. Tachycardia present. Heart sounds: Normal heart sounds. Comments: HR on recheck is 120  Pulmonary:      Effort: Pulmonary effort is normal. No respiratory distress. Breath sounds: Normal breath sounds. No stridor. No wheezing, rhonchi or rales. Comments: Conversational without cough or dyspnea    Abdominal:      General: Bowel sounds are normal.   Neurological:      Mental Status: She is alert and oriented to person, place, and time. Psychiatric:         Mood and Affect: Mood normal.         Behavior: Behavior normal.         MDM    ICD-10-CM ICD-9-CM    1. Encounter for laboratory testing for COVID-19 virus  Z20.822 V01.79 NOVEL CORONAVIRUS (COVID-19)   2.  Tachycardia  R00.0 785.0 NOVEL CORONAVIRUS (COVID-19)     No orders of the defined types were placed in this encounter. The patient's condition and possible alternative diagnoses were discussed with the patient and they verbalized understanding. The patient is to follow up with their primary care doctor for continued care. If signs and symptoms persist or become worse or new symptoms develop, the pt is to go immediately to the emergency department. Any new medications that may have been written for should be taken as directed but should always be discussed with the primary care physician and pharmacist. This was communicated to the patient. Pt instructed to quarantine until COVID testing results are back and then duration of quarantine will depend on result, current recommendations and symptoms. The patient is to get immediate re-evaluation for any new or worsening symptoms. They are to quarantine from other household members. It was recommended they stay hydrated and practice deep breathing exercises. Due to pt having an elevated HR (HR is usually ), I recommend she go to the ED from here for further evaluation but as she is on Hospice. She said she will call Dr Renate Saldaña (her heart Dr) when she gets home and discuss this with him. She continues to deny any sx.     Procedures

## 2021-01-30 LAB — SARS-COV-2, NAA: NOT DETECTED

## 2021-03-17 LAB — SARS-COV-2, NAA: NEGATIVE

## 2021-04-15 ENCOUNTER — TELEPHONE (OUTPATIENT)
Dept: INTERNAL MEDICINE CLINIC | Age: 66
End: 2021-04-15

## 2021-04-15 NOTE — TELEPHONE ENCOUNTER
Patient called stating that her feet are swelling. She wants to schedule an in office visit with Juanis Vines but there are none available soon. She wants a call back at [phone]

## 2021-04-16 NOTE — TELEPHONE ENCOUNTER
Call placed to patient and attempted to schedule patient with Cedirc Oliver FNP for today for evaluation, patient declined stating she wanted to see Dr Melanie Muse face to face in office, offered next available office visit on 5/27/2021 patient declined stating she would call office back at later time for an appointment, again writer offered VV with Cedric Oliver or in office with [facility] CAM FNP and patient declined

## 2021-05-14 ENCOUNTER — VIRTUAL VISIT (OUTPATIENT)
Dept: INTERNAL MEDICINE CLINIC | Age: 66
End: 2021-05-14
Payer: MEDICARE

## 2021-05-14 DIAGNOSIS — I42.9 CARDIOMYOPATHY, UNSPECIFIED TYPE (HCC): Primary | ICD-10-CM

## 2021-05-14 DIAGNOSIS — K21.9 GASTROESOPHAGEAL REFLUX DISEASE WITHOUT ESOPHAGITIS: ICD-10-CM

## 2021-05-14 DIAGNOSIS — I25.10 CORONARY ARTERY DISEASE INVOLVING NATIVE CORONARY ARTERY OF NATIVE HEART WITHOUT ANGINA PECTORIS: ICD-10-CM

## 2021-05-14 DIAGNOSIS — G47.00 INSOMNIA, UNSPECIFIED TYPE: ICD-10-CM

## 2021-05-14 DIAGNOSIS — F51.01 PRIMARY INSOMNIA: ICD-10-CM

## 2021-05-14 DIAGNOSIS — I10 ESSENTIAL HYPERTENSION, BENIGN: ICD-10-CM

## 2021-05-14 DIAGNOSIS — D59.10 AUTOIMMUNE HEMOLYTIC ANEMIA (HCC): ICD-10-CM

## 2021-05-14 DIAGNOSIS — Z09 HOSPITAL DISCHARGE FOLLOW-UP: ICD-10-CM

## 2021-05-14 DIAGNOSIS — E78.00 PURE HYPERCHOLESTEROLEMIA: ICD-10-CM

## 2021-05-14 PROCEDURE — G9899 SCRN MAM PERF RSLTS DOC: HCPCS | Performed by: INTERNAL MEDICINE

## 2021-05-14 PROCEDURE — 1111F DSCHRG MED/CURRENT MED MERGE: CPT | Performed by: INTERNAL MEDICINE

## 2021-05-14 PROCEDURE — 99214 OFFICE O/P EST MOD 30 MIN: CPT | Performed by: INTERNAL MEDICINE

## 2021-05-14 PROCEDURE — G8536 NO DOC ELDER MAL SCRN: HCPCS | Performed by: INTERNAL MEDICINE

## 2021-05-14 PROCEDURE — 3017F COLORECTAL CA SCREEN DOC REV: CPT | Performed by: INTERNAL MEDICINE

## 2021-05-14 PROCEDURE — G8510 SCR DEP NEG, NO PLAN REQD: HCPCS | Performed by: INTERNAL MEDICINE

## 2021-05-14 PROCEDURE — 1101F PT FALLS ASSESS-DOCD LE1/YR: CPT | Performed by: INTERNAL MEDICINE

## 2021-05-14 PROCEDURE — G8399 PT W/DXA RESULTS DOCUMENT: HCPCS | Performed by: INTERNAL MEDICINE

## 2021-05-14 PROCEDURE — 1090F PRES/ABSN URINE INCON ASSESS: CPT | Performed by: INTERNAL MEDICINE

## 2021-05-14 PROCEDURE — G8421 BMI NOT CALCULATED: HCPCS | Performed by: INTERNAL MEDICINE

## 2021-05-14 PROCEDURE — G8427 DOCREV CUR MEDS BY ELIG CLIN: HCPCS | Performed by: INTERNAL MEDICINE

## 2021-05-14 PROCEDURE — G8756 NO BP MEASURE DOC: HCPCS | Performed by: INTERNAL MEDICINE

## 2021-05-14 RX ORDER — DIGOXIN 125 MCG
0.12 TABLET ORAL
COMMUNITY
Start: 2021-03-19

## 2021-05-14 RX ORDER — AMIODARONE HYDROCHLORIDE 200 MG/1
200 TABLET ORAL
COMMUNITY
Start: 2021-05-11

## 2021-05-14 RX ORDER — PANTOPRAZOLE SODIUM 40 MG/1
40 TABLET, DELAYED RELEASE ORAL DAILY
Qty: 90 TAB | Refills: 1 | Status: SHIPPED | OUTPATIENT
Start: 2021-05-14 | End: 2021-11-08 | Stop reason: SDUPTHER

## 2021-05-14 RX ORDER — TRAZODONE HYDROCHLORIDE 50 MG/1
50 TABLET ORAL
Qty: 90 TAB | Refills: 1 | Status: SHIPPED | OUTPATIENT
Start: 2021-05-14 | End: 2021-11-08 | Stop reason: SDUPTHER

## 2021-05-14 NOTE — PROGRESS NOTES
Health Maintenance Due   Topic Date Due    Shingrix Vaccine Age 49> (1 of 2) Never done    Colorectal Cancer Screening Combo  12/27/2016    Pneumococcal 65+ years (2 of 2 - PPSV23) 05/29/2020       Chief Complaint   Patient presents with   [facility] Follow Up     [facility] for CHF    Cholesterol Problem    Irregular Heart Beat       1. Have you been to the ER, urgent care clinic since your last visit? Hospitalized since your last visit? Yes When: 5/11/2021 Where: [facility] Reason for visit: CHF    2. Have you seen or consulted any other health care providers outside of the [address] [place] [address] since your last visit? Include any pap smears or colon screening. No    3) Do you have an Advance Directive on file? no    4) Are you interested in receiving information on Advance Directives? NO      Patient is accompanied by daughter I have received verbal consent from Lucinda Santacruz to discuss any/all medical information while they are present in the room.

## 2021-05-14 NOTE — PROGRESS NOTES
Erinn Erickson is a 77 y.o. female who was seen by synchronous (real-time) audio-video technology on 5/14/2021 for Hospital Follow Up [facility] for CHF), Cholesterol Problem, and Irregular Heart Beat        Assessment & Plan:   Diagnoses and all orders for this visit:    1. Cardiomyopathy, unspecified type ([address]    Decompensated heart failure. [facility]  Her cardiologist Corinthia Goodell now put her on milrinone continuous IV drip for 1 week. She will follow up with him in a week and decide if she will continue the drip or not. She is taking Bumex as needed for leg swelling. Seems comfortable, no cough right now no shortness of breath. Advised her to get medical record from her other doctors hospital.  Need to watch salt intake. Follow-up in few months. 2. Essential hypertension, benign  Stable blood pressure. Not on any medicine right now. 3. Coronary artery disease involving native coronary artery of native heart without angina pectoris  Doing well. On Lipitor. Not on any blood thinner/aspirin because of autoimmune hemolytic anemia. 4. Autoimmune hemolytic anemia (HCC)  Taking Decadron. Monitored by hematologist.  5. Pure hypercholesterolemia  Stable. On statin. 6. Primary insomnia    7. Hospital discharge follow-up  -     LA DISCHARGE MEDS RECONCILED W/ CURRENT OUTPATIENT MED LIST    8. Insomnia  We will refill,  -     traZODone (DESYREL) 50 mg tablet; Take 1 Tab by mouth nightly. -     pantoprazole (PROTONIX) 40 mg tablet; Take 1 Tab by mouth daily. 1 tab daily  9. GERD  On Protonix. Will give refill. I spent at least 30 minutes on this visit with this established patient. Subjective:   Ms. Arley Mckay is here for follow-up. [facility] with cough and shortness of breath. Found to have decompensated heart failure. She was seen by her cardiologist Dr. Juan Carlos Tirado.   She is placed on IV milrinone continuous drip and she will follow up with cardiologist in 1 week. Still having some feet edema and leg edema. Taking Bumex as needed. Trying to watch her salt. She is able to walk without short of breath right now. She is feeling much better. No cough right now. Has autoimmune hemolytic anemia, taking Decadron tapering dosage. Managed by her cardiologist/M oncologist.  Has elevated lipids, on statin. Doing well. Has insomnia, taking trazodone. Needed refill. Has a gastric reflux, taking Protonix regularly. Would like to get a refill. No labs found since patient [facility].  I have advised patient's to obtain new lab work from hospital.  Eliz Antonella is up-to-date. Prior to Admission medications    Medication Sig Start Date End Date Taking? Authorizing Provider   amiodarone (CORDARONE) 200 mg tablet 200 mg. 5/11/21  Yes Provider, Historical   digoxin (LANOXIN) 0.125 mg tablet 0.125 mg. 3/19/21  Yes Provider, Historical   bumetanide (BUMEX) 1 mg tablet  10/16/20  Yes Provider, Historical   traZODone (DESYREL) 50 mg tablet  10/26/20  Yes Provider, Historical   ammonium lactate (LAC-HYDRIN) 12 % lotion rub in to affected area well 10/29/20  Yes MD Chikis Alejandraia Wooton Calcium-Vit D3 500 mg(1,250mg) -200 unit per tablet TAKE ONE TABLET BY MOUTH TWICE A DAY WITH MEALS 10/5/20  Yes Alhaji Starks MD   dexAMETHasone (DECADRON) 1 mg tablet Take 1 mg by mouth every twelve (12) hours. Yes Provider, Historical   pantoprazole (PROTONIX) 40 mg tablet Take 40 mg by mouth daily. 1 tab daily   Yes Provider, Historical   ALPRAZolam (XANAX) 0.25 mg tablet Take 1 Tab by mouth nightly as needed for Anxiety.  Max Daily Amount: 0.25 mg. 11/13/19  Yes Alhaji Starks MD   CITRUS CALCIUM tablet TAKE ONE TABLET BY MOUTH DAILY WITH BREAKFAST  Patient taking differently: 1 Tab. 5/7/18  Yes Alhaji Starks MD   zolpidem (AMBIEN) 5 mg tablet 5 mg.  5/14/21  Provider, Historical   atorvastatin (LIPITOR) 40 mg tablet Take 1 Tab by mouth daily. 9/12/17   Kelly Corbin MD     Past Medical History:   Diagnosis Date    Alcohol abuse     2/25/16 phone note    Anasarca 10/18/13    per CT. Dr. Levy Tavarez.  Atrial myxoma 06/06/14    Dr. Megan Morillo.  CAD (coronary artery disease)     Diastolic heart failure [address] [address] 10/15/13    Dr. Ash Yao.  Diverticulosis 12/27/11    Dr. Alix Zayas.  DJD (degenerative joint disease) of knee     Dr. Brad Laird Elevated cholesterol     Elevated liver enzymes 09/2013    AST, ALT, Alk P    Heartburn     Hepatic steatosis 10/18/13    Hypertension     Low sodium 06/06/14    admitted to [facility]    Migraine     MVP (mitral valve prolapse)     w/MR  Dr. Yuniel Emery. Dr. Shantanu Lamb. Dr. Megan Morillo.  Osteopenia 03/04/08    Ovarian cyst, bilateral     PAF (paroxysmal atrial fibrillation) ([facility] [address] 03/21/11    New. Dr. Taina Cardoso. Dr. Shantanu Lamb. Dr. Annie England Severe mitral regurgitation by prior echocardiogram 03/21/11    Dr. Daren Jerome. Dr. Annie England Uterine fibroid        ROS significant for leg swelling.     Objective:     Patient-Reported Vitals 5/14/2021   Patient-Reported Height -   Patient-Reported Pulse 83   Patient-Reported Temperature 96.3   Patient-Reported SpO2 95   Patient-Reported Systolic  020   Patient-Reported Diastolic 80          Constitutional: [x] Appears well-developed and well-nourished [x] No apparent distress      [] Abnormal -     Mental status: [x] Alert and awake  [x] Oriented to person/place/time [x] Able to follow commands    [] Abnormal -       HENT: [x] Normocephalic, atraumatic  [] Abnormal -   [x] Mouth/Throat: Mucous membranes are moist    External Ears [x] Normal  [] Abnormal -    Neck: [x] No visualized mass [] Abnormal -     Pulmonary/Chest: [x] Respiratory effort normal   [x] No visualized signs of difficulty breathing or respiratory distress        [] Abnormal -      Musculoskeletal:   [x] Normal gait with no signs of ataxia         [x] Normal range of motion of neck        [] Abnormal -   Bilateral lower extremities: 1+ leg edema present extending from shin to feet. Neurological:        [x] No Facial Asymmetry (Cranial nerve 7 motor function) (limited exam due to video visit)          [x] No gaze palsy        [] Abnormal -                   Psychiatric:       [x] Normal Affect [] Abnormal -        [x] No Hallucinations    Other pertinent observable physical exam findings:-        We discussed the expected course, resolution and complications of the diagnosis(es) in detail. Medication risks, benefits, costs, interactions, and alternatives were discussed as indicated. I advised her to contact the office if her condition worsens, changes or fails to improve as anticipated. She expressed understanding with the diagnosis(es) and plan. Kishore Go, was evaluated through a synchronous (real-time) audio-video encounter. The patient (or guardian if applicable) is aware that this is a billable service. Verbal consent to proceed has been obtained within the past 12 months. The visit was conducted pursuant to the emergency declaration under the [address] [place] [address] authority and the [employer] and [employer] General Act. Patient identification was verified, and a caregiver was present when appropriate. The patient was located in a state where the provider was credentialed to provide care.       Adams Goldmann, MD

## 2021-08-05 ENCOUNTER — VIRTUAL VISIT (OUTPATIENT)
Dept: INTERNAL MEDICINE CLINIC | Age: 66
End: 2021-08-05
Payer: MEDICARE

## 2021-08-05 DIAGNOSIS — D59.10 AUTOIMMUNE HEMOLYTIC ANEMIA (HCC): ICD-10-CM

## 2021-08-05 DIAGNOSIS — Z71.89 ACP (ADVANCE CARE PLANNING): ICD-10-CM

## 2021-08-05 DIAGNOSIS — Z78.0 POST-MENOPAUSE: ICD-10-CM

## 2021-08-05 DIAGNOSIS — I50.32 CHRONIC DIASTOLIC HEART FAILURE (HCC): ICD-10-CM

## 2021-08-05 DIAGNOSIS — Z00.00 MEDICARE ANNUAL WELLNESS VISIT, SUBSEQUENT: ICD-10-CM

## 2021-08-05 DIAGNOSIS — E78.00 PURE HYPERCHOLESTEROLEMIA: ICD-10-CM

## 2021-08-05 DIAGNOSIS — I10 ESSENTIAL HYPERTENSION, BENIGN: Primary | ICD-10-CM

## 2021-08-05 DIAGNOSIS — I42.9 CARDIOMYOPATHY, UNSPECIFIED TYPE (HCC): ICD-10-CM

## 2021-08-05 PROCEDURE — 3017F COLORECTAL CA SCREEN DOC REV: CPT | Performed by: INTERNAL MEDICINE

## 2021-08-05 PROCEDURE — 1090F PRES/ABSN URINE INCON ASSESS: CPT | Performed by: INTERNAL MEDICINE

## 2021-08-05 PROCEDURE — 1101F PT FALLS ASSESS-DOCD LE1/YR: CPT | Performed by: INTERNAL MEDICINE

## 2021-08-05 PROCEDURE — G9899 SCRN MAM PERF RSLTS DOC: HCPCS | Performed by: INTERNAL MEDICINE

## 2021-08-05 PROCEDURE — G8510 SCR DEP NEG, NO PLAN REQD: HCPCS | Performed by: INTERNAL MEDICINE

## 2021-08-05 PROCEDURE — G8536 NO DOC ELDER MAL SCRN: HCPCS | Performed by: INTERNAL MEDICINE

## 2021-08-05 PROCEDURE — 99497 ADVNCD CARE PLAN 30 MIN: CPT | Performed by: INTERNAL MEDICINE

## 2021-08-05 PROCEDURE — G8427 DOCREV CUR MEDS BY ELIG CLIN: HCPCS | Performed by: INTERNAL MEDICINE

## 2021-08-05 PROCEDURE — G8399 PT W/DXA RESULTS DOCUMENT: HCPCS | Performed by: INTERNAL MEDICINE

## 2021-08-05 PROCEDURE — G8421 BMI NOT CALCULATED: HCPCS | Performed by: INTERNAL MEDICINE

## 2021-08-05 PROCEDURE — 99214 OFFICE O/P EST MOD 30 MIN: CPT | Performed by: INTERNAL MEDICINE

## 2021-08-05 PROCEDURE — G8756 NO BP MEASURE DOC: HCPCS | Performed by: INTERNAL MEDICINE

## 2021-08-05 PROCEDURE — G0439 PPPS, SUBSEQ VISIT: HCPCS | Performed by: INTERNAL MEDICINE

## 2021-08-05 NOTE — PROGRESS NOTES
Health Maintenance Due   Topic Date Due    Shingrix Vaccine Age 49> (1 of 2) Never done    Colorectal Cancer Screening Combo  12/27/2016    Pneumococcal 65+ years (2 of 2 - PPSV23) 05/29/2020    Lipid Screen  06/27/2021    Medicare Yearly Exam  08/13/2021       Chief Complaint   Patient presents with    Annual Wellness Visit    Hypertension    Insomnia       1. Have you been to the ER, urgent care clinic since your last visit? Hospitalized since your last visit? Yes, [facility], 6/30/21, Drainage of fluid    2. Have you seen or consulted any other health care providers outside of the [address] [place] [address] since your last visit? Include any pap smears or colon screening. No    3) Do you have an Advance Directive on file? no    4) Are you interested in receiving information on Advance Directives? NO      Patient is accompanied by self I have received verbal consent from Verner Dago to discuss any/all medical information while they are present in the room.

## 2021-08-05 NOTE — PATIENT INSTRUCTIONS
Medicare Wellness Visit, Female     The best way to live healthy is to have a lifestyle where you eat a well-balanced diet, exercise regularly, limit alcohol use, and quit all forms of tobacco/nicotine, if applicable. Regular preventive services are another way to keep healthy. Preventive services (vaccines, screening tests, monitoring & exams) can help personalize your care plan, which helps you manage your own care. Screening tests can find health problems at the earliest stages, when they are easiest to treat. [facility] follows the current, evidence-based guidelines published by the [facility] Lenin Garcia ([facility]) when recommending preventive services for our patients. Because we follow these guidelines, sometimes recommendations change over time as research supports it. (For example, mammograms used to be recommended annually. Even though Medicare will still pay for an annual mammogram, the newer guidelines recommend a mammogram every two years for women of average risk). Of course, you and your doctor may decide to screen more often for some diseases, based on your risk and your co-morbidities (chronic disease you are already diagnosed with). Preventive services for you include:  - Medicare offers their members a free annual wellness visit, which is time for you and your primary care provider to discuss and plan for your preventive service needs. Take advantage of this benefit every year!  -All adults over the age of 72 should receive the recommended pneumonia vaccines. Current USPSTF guidelines recommend a series of two vaccines for the best pneumonia protection.   -All adults should have a flu vaccine yearly and a tetanus vaccine every 10 years.   -All adults age 48 and older should receive the shingles vaccines (series of two vaccines).       -All adults age 38-68 who are overweight should have a diabetes screening test once every three years.   -All adults born between [birth year] and 1965 should be screened once for Hepatitis C.  -Other screening tests and preventive services for persons with diabetes include: an eye exam to screen for diabetic retinopathy, a kidney function test, a foot exam, and stricter control over your cholesterol.   -Cardiovascular screening for adults with routine risk involves an electrocardiogram (ECG) at intervals determined by your doctor.   -Colorectal cancer screenings should be done for adults age 54-65 with no increased risk factors for colorectal cancer. There are a number of acceptable methods of screening for this type of cancer. Each test has its own benefits and drawbacks. Discuss with your doctor what is most appropriate for you during your annual wellness visit. The different tests include: colonoscopy (considered the best screening method), a fecal occult blood test, a fecal DNA test, and sigmoidoscopy.    -A bone mass density test is recommended when a woman turns 65 to screen for osteoporosis. This test is only recommended one time, as a screening. Some providers will use this same test as a disease monitoring tool if you already have osteoporosis. -Breast cancer screenings are recommended every other year for women of normal risk, age 54-69.  -Cervical cancer screenings for women over age 72 are only recommended with certain risk factors.      Here is a list of your current Health Maintenance items (your personalized list of preventive services) with a due date:  Health Maintenance Due   Topic Date Due    Shingles Vaccine (1 of 2) Never done    Colorectal Screening  12/27/2016    Pneumococcal Vaccine (2 of 2 - PPSV23) 05/29/2020    Cholesterol Test   06/27/2021

## 2021-08-05 NOTE — ACP (ADVANCE CARE PLANNING)

## 2021-08-05 NOTE — PROGRESS NOTES
Atilio Miller is a 77 y.o. female who was seen by synchronous (real-time) audio-video technology on 8/5/2021 for Annual Wellness Visit, Hypertension, and Insomnia        Assessment & Plan:   Diagnoses and all orders for this visit:    1. Essential hypertension, benign    Stable blood pressure. 2. Cardiomyopathy, unspecified type [facility])    [place] cardi[place]. [place] [facility].  On IV milrinone drips changing bags every other day. Also taking digoxin and amiodarone. She has lost approximately 20 pound weight in past several months. Feeling much better. Will check,  -     CBC WITH AUTOMATED DIFF  -     METABOLIC PANEL, COMPREHENSIVE    3. Autoimmune hemolytic anemia (HCC)    We will repeat,  -     CBC WITH AUTOMATED DIFF    4. Pure hypercholesterolemia    On Lipitor. Will check,  -     METABOLIC PANEL, COMPREHENSIVE  -     LIPID PANEL    5. Chronic diastolic heart failure (HCC)    Compensated. Will check,  -     TSH 3RD GENERATION    6. Medicare annual wellness visit, subsequent    7. ACP (advance care planning)    8. Post-menopause    We will check,  -     DEXA BONE DENSITY STUDY AXIAL; Future        I spent at least 30 minutes on this visit with this established patient. Subjective:   Ms. Neftali Chapman is here for follow-up. She is staying home. Still having IV milrinone for heart failure. Seeing cardiologist Dr. Vlad Carrera. Feeling much better. She has lost significant amount of weight. No shortness of breath orthopnea. On Bumex. Also taking digoxin and amiodarone. Has elevated lipids, on statin. Need lab work. Need thyroid testing since she is on amiodarone. Add autoimmune hemolytic anemia. Hemoglobin was normal.  Need to repeat it. No anxiety or depression. Has insomnia, taking trazodone. Need bone density. Mammogram is up-to-date. Eye checkup up-to-date. Here for Medicare wellness visit. Has no living will.     Prior to Admission medications    Medication Sig Start Date End Date Taking? Authorizing Provider   POTASSIUM CHLORIDE 20 mg by Does Not Apply route daily. Yes Provider, Historical   milrinone lactate (MILRINONE IV) 36 mg by IntraVENous route. 36 mg in D5   Yes Provider, Historical   amiodarone (CORDARONE) 200 mg tablet 200 mg. 5/11/21  Yes Provider, Historical   digoxin (LANOXIN) 0.125 mg tablet 0.125 mg. 3/19/21  Yes Provider, Historical   traZODone (DESYREL) 50 mg tablet Take 1 Tab by mouth nightly. 5/14/21  Yes Lashell Khan MD   pantoprazole (PROTONIX) 40 mg tablet Take 1 Tab by mouth daily. 1 tab daily 5/14/21  Yes Lashell Khan MD   bumetanide [facility]) 1 mg tablet  10/16/20  Yes Provider, Historical   ammonium lactate (LAC-HYDRIN) 12 % lotion rub in to affected area well 10/29/20  Yes Lashell Khan MD   [facility] Calcium-Vit D3 500 mg(1,250mg) -200 unit per tablet TAKE ONE TABLET BY MOUTH TWICE A DAY WITH MEALS 10/5/20  Yes Lashell Khan MD   dexAMETHasone (DECADRON) 1 mg tablet Take 1 mg by mouth every twelve (12) hours. Yes Provider, Historical   ALPRAZolam (XANAX) 0.25 mg tablet Take 1 Tab by mouth nightly as needed for Anxiety. Max Daily Amount: 0.25 mg. 11/13/19  Yes Lashell Khan MD   CITRUS CALCIUM tablet TAKE ONE TABLET BY MOUTH DAILY WITH BREAKFAST  Patient taking differently: 1 Tab. 5/7/18  Yes Lashell Khan MD   atorvastatin (LIPITOR) 40 mg tablet Take 1 Tab by mouth daily. 9/12/17  Yes Evan Solitario MD     Past Medical History:   Diagnosis Date    Alcohol abuse     2/25/16 phone note    Anasarca 10/18/13    per CT. Dr. Rakesh Odom.  Atrial myxoma 06/06/14    Dr. Kristy Andrews.  CAD (coronary artery disease)     Diastolic heart failure ([facility] [address] 10/15/13    Dr. Augie Ordaz.  Diverticulosis 12/27/11    Dr. Richard Steele.     DJD (degenerative joint disease) of knee     Dr. Ky Chu Elevated cholesterol     Elevated liver enzymes 09/2013    AST, ALT, Alk P    Heartburn     Hepatic steatosis 10/18/13    Hypertension     Low sodium [facility]/06/14 [facility]    Migraine     MVP (mitral valve prolapse)     w/MR  Dr. Jhon Lance. Dr. Vincenzo Christian. Dr. Marce Nicholson.  Osteopenia 03/04/08    Ovarian cyst, bilateral     PAF (paroxysmal atrial fibrillation) ([facility] [address] 03/21/11    New. Dr. Julia Steward. Dr. Vincenzo Christian. Dr. Freya Scott Severe mitral regurgitation by prior echocardiogram 03/21/11    Dr. Ji Pate. Dr. Freya Scott Uterine fibroid        ROS negative    Objective:     Patient-Reported Vitals 8/5/2021   Patient-Reported Weight 101 lbs   Patient-Reported Height -   Patient-Reported Pulse 87   Patient-Reported Temperature -   Patient-Reported SpO2 95   Patient-Reported Systolic  606   Patient-Reported Diastolic 60          Constitutional: [x] Appears well-developed and well-nourished [x] No apparent distress      [] Abnormal -     Mental status: [x] Alert and awake  [x] Oriented to person/place/time [x] Able to follow commands    [] Abnormal -       HENT: [x] Normocephalic, atraumatic  [] Abnormal -   [x] Mouth/Throat: Mucous membranes are moist    External Ears [x] Normal  [] Abnormal -    Neck: [x] No visualized mass [] Abnormal -     Pulmonary/Chest: [x] Respiratory effort normal   [x] No visualized signs of difficulty breathing or respiratory distress        [] Abnormal -      Musculoskeletal:   [x] Normal gait with no signs of ataxia         [x] Normal range of motion of neck        [] Abnormal -     Neurological:        [x] No Facial Asymmetry (Cranial nerve 7 motor function) (limited exam due to video visit)          [x] No gaze palsy        [] Abnormal -          Skin:        [x] No significant exanthematous lesions or discoloration noted on facial skin         [] Abnormal -            Psychiatric:       [x] Normal Affect [] Abnormal -        [x] No Hallucinations    Other pertinent observable physical exam findings:-        We discussed the expected course, resolution and complications of the diagnosis(es) in detail.   Medication risks, benefits, costs, interactions, and alternatives were discussed as indicated. I advised her to contact the office if her condition worsens, changes or fails to improve as anticipated. She expressed understanding with the diagnosis(es) and plan. Nikolai Lemus, was evaluated through a synchronous (real-time) audio-video encounter. The patient (or guardian if applicable) is aware that this is a billable service. Verbal consent to proceed has been obtained within the past 12 months. The visit was conducted pursuant to the emergency declaration under the [address] [place] [address], [address] [place] [address] authority and the [employer] General Act. Patient identification was verified, and a caregiver was present when appropriate. The patient was located in a state where the provider was credentialed to provide care.       Chance Vanessa MD

## 2021-08-05 NOTE — PROGRESS NOTES
This is the Subsequent Medicare Annual Wellness Exam, performed 12 months or more after the Initial AWV or the last Subsequent AWV    I have reviewed the patient's medical history in detail and updated the computerized patient record. Assessment/Plan   Education and counseling provided:  Are appropriate based on today's review and evaluation  End-of-Life planning (with patient's consent)  Colorectal cancer screening tests  Bone mass measurement (DEXA)  Screening for glaucoma         Depression Risk Factor Screening     3 most recent PHQ Screens 8/5/2021   Little interest or pleasure in doing things Not at all   Feeling down, depressed, irritable, or hopeless Not at all   Total Score PHQ 2 0       Alcohol Risk Screen    Do you average more than 1 drink per night or more than 7 drinks a week:  No    On any one occasion in the past three months have you have had more than 3 drinks containing alcohol:  No        Functional Ability and Level of Safety    Hearing: Hearing is good. Activities of Daily Living: The home contains: no safety equipment. Patient does total self care      Ambulation: with no difficulty     Fall Risk:  Fall Risk Assessment, last 12 mths 8/5/2021   Able to walk? Yes   Fall in past 12 months? 0   Do you feel unsteady?  0   Are you worried about falling 0      Abuse Screen:  Patient is not abused       Cognitive Screening    Has your family/caregiver stated any concerns about your memory: no     Cognitive Screening: Normal - MMSE (Mini Mental Status Exam)    Health Maintenance Due     Health Maintenance Due   Topic Date Due    Shingrix Vaccine Age 49> (1 of 2) Never done    Colorectal Cancer Screening Combo  12/27/2016    Pneumococcal 65+ years (2 of 2 - PPSV23) 05/29/2020    Lipid Screen  06/27/2021       Patient Care Team   Patient Care Team:  Izabela Kelley MD as PCP - General (Internal Medicine)  Izabela Kelley MD as PCP - [facility] Empaneled Provider  Clemencia Bernheim, MD as Referring Provider (Cardiology)  Yessy Burden MD (Radiology)  Gayle Wadsworth MD (Colon and Rectal Surgery)  Lisa Pineda MD (Nephrology)    History     Patient Active Problem List   Diagnosis Code    Pure hypercholesterolemia E78.00    Essential hypertension, benign I10    Hematuria, unspecified R31.9    Diffuse cystic mastopathy N60.19    Osteopenia M85.80    MVP (mitral valve prolapse) I34.1    Mitral regurgitation I34.0    CAD (coronary artery disease), native coronary artery I25.10    Back pain, thoracic M54.6    Hyponatremia I44.1    Diastolic heart failure (HCC) I50.30    Cardiomyopathy ([address] I42.9    Elevated liver enzymes R74.8    Atrial myxoma D15.1    Elevated brain natriuretic peptide (BNP) level R79.89    Alcohol abuse F10.10    Pneumonia J18.9    Paroxysmal atrial fibrillation (HCC) I48.0     Past Medical History:   Diagnosis Date    Alcohol abuse     2/25/16 phone note    [address] 10/18/13    per CT. Dr. Cecily Nageotte.  Atrial myxoma 06/06/14    Dr. Jennifer Thomas.  CAD (coronary artery disease)     Diastolic heart failure ([address] 10/15/13    Dr. Isreal López.  Diverticulosis 12/27/11    Dr. Martha Simms.  DJD (degenerative joint disease) of knee     Dr. Jeison Neumann Elevated cholesterol     Elevated liver enzymes 09/2013    AST, ALT, Alk P    Heartburn     Hepatic steatosis 10/18/13    Hypertension     Low sodium 06/06/14    [facility]    Migraine     MVP (mitral valve prolapse)     w/MR  Dr. Rachelle Uriostegui. Dr. Elder Maradiaga. Dr. Jennifer Thomas.  Osteopenia 03/04/08    Ovarian cyst, bilateral     PAF (paroxysmal atrial fibrillation) ([address] 03/21/11    New. Dr. Randall Manning. Dr. Elder Maradiaga. Dr. Maribeth Chavis Severe mitral regurgitation by prior echocardiogram 03/21/11    Dr. Opal Gonzalez. Dr. Maribeth Chavis Uterine fibroid       Past Surgical History:   Procedure Laterality Date    COLONOSCOPY  2006, 12/27/11    Dr. Martha Simms.   due q 5-10 yrs    HX BUNIONECTOMY  2000, 1990's    Rt 2000, Lt 1990s    HX CYST INCISION AND DRAINAGE      HX HEART CATHETERIZATION  04/12/11    Dr. Marie Pepper    HX HYSTEROSCOPY WITH ENDOMETRIAL ABLATION  11/2004    HX KNEE ARTHROSCOPY Left     HX [facility] R    HX MYOMECTOMY  11/2004    HX OVARIAN CYST REMOVAL Right 2004    HX WOLFGANG AND BSO  2005    HX TUBAL LIGATION      MA CARDIAC SURG PROCEDURE UNLIST  5/23/2011    CABG x 1 vessel, MV REPAIR. Dr. Lili Fang. Current Outpatient Medications   Medication Sig Dispense Refill    POTASSIUM CHLORIDE 20 mg by Does Not Apply route daily.  amiodarone (CORDARONE) 200 mg tablet 200 mg.  digoxin (LANOXIN) 0.125 mg tablet 0.125 mg.      traZODone (DESYREL) 50 mg tablet Take 1 Tab by mouth nightly. 90 Tab 1    pantoprazole (PROTONIX) 40 mg tablet Take 1 Tab by mouth daily. 1 tab daily 90 Tab 1    bumetanide (BUMEX) 1 mg tablet       ammonium lactate (LAC-HYDRIN) 12 % lotion rub in to affected area well 1 Bottle 2    Oyster Shell Calcium-Vit D3 500 mg(1,250mg) -200 unit per tablet TAKE ONE TABLET BY MOUTH TWICE A DAY WITH MEALS 60 Tab 10    dexAMETHasone (DECADRON) 1 mg tablet Take 1 mg by mouth every twelve (12) hours.  ALPRAZolam (XANAX) 0.25 mg tablet Take 1 Tab by mouth nightly as needed for Anxiety. Max Daily Amount: 0.25 mg. 20 Tab 0    CITRUS CALCIUM tablet TAKE ONE TABLET BY MOUTH DAILY WITH BREAKFAST (Patient taking differently: 1 Tab.) 30 Tab 11    atorvastatin (LIPITOR) 40 mg tablet Take 1 Tab by mouth daily.  30 Tab 5     Allergies   Allergen Reactions    Amoxicillin Diarrhea     1 day after Doxycycline    Doxycycline Diarrhea     100mg single dose \"too strong\" and caused diarrhea       Family History   Problem Relation Age of Onset    Heart Surgery Mother         CABG    Hypertension Mother     High Cholesterol Mother     Cancer Father         bone    Diabetes Sister     High Cholesterol Brother     Cancer Maternal Grandmother      Social History     Tobacco Use    Smoking status: Former Smoker     Packs/day: 0.25     Years: 23.00     Pack years: 5.75     Types: Cigarettes     Quit date: 2016     Years since quittin.5    Smokeless tobacco: Never Used    Tobacco comment: very rarely cigarettes--no cigarettes X 2 weeks   Substance Use Topics    Alcohol use: No     Alcohol/week: 0.0 standard drinks     Comment: as of 16         Jimenez Lim MD

## 2021-09-01 LAB
ALBUMIN SERPL-MCNC: 4.2 G/DL (ref 3.8–4.8)
ALBUMIN/GLOB SERPL: 1.6 {RATIO} (ref 1.2–2.2)
ALP SERPL-CCNC: 106 IU/L (ref 48–121)
ALT SERPL-CCNC: 24 IU/L (ref 0–32)
AST SERPL-CCNC: 39 IU/L (ref 0–40)
BASOPHILS # BLD AUTO: 0.1 X10E3/UL (ref 0–0.2)
BASOPHILS NFR BLD AUTO: 1 %
BILIRUB SERPL-MCNC: 0.7 MG/DL (ref 0–1.2)
BUN SERPL-MCNC: 21 MG/DL (ref 8–27)
BUN/CREAT SERPL: 18 (ref 12–28)
CALCIUM SERPL-MCNC: 9.5 MG/DL (ref 8.7–10.3)
CHLORIDE SERPL-SCNC: 97 MMOL/L (ref 96–106)
CHOLEST SERPL-MCNC: 393 MG/DL (ref 100–199)
CO2 SERPL-SCNC: 25 MMOL/L (ref 20–29)
CREAT SERPL-MCNC: 1.2 MG/DL (ref 0.57–1)
EOSINOPHIL # BLD AUTO: 0.1 X10E3/UL (ref 0–0.4)
EOSINOPHIL NFR BLD AUTO: 1 %
ERYTHROCYTE [DISTWIDTH] IN BLOOD BY AUTOMATED COUNT: 19.5 % (ref 11.7–15.4)
GLOBULIN SER CALC-MCNC: 2.7 G/DL (ref 1.5–4.5)
GLUCOSE SERPL-MCNC: 79 MG/DL (ref 65–99)
HCT VFR BLD AUTO: 44 % (ref 34–46.6)
HDLC SERPL-MCNC: 132 MG/DL
HGB BLD-MCNC: 14.4 G/DL (ref 11.1–15.9)
IMM GRANULOCYTES # BLD AUTO: 0.1 X10E3/UL (ref 0–0.1)
IMM GRANULOCYTES NFR BLD AUTO: 1 %
IMP & REVIEW OF LAB RESULTS: NORMAL
INTERPRETATION: NORMAL
LDLC SERPL CALC-MCNC: 250 MG/DL (ref 0–99)
LYMPHOCYTES # BLD AUTO: 1.5 X10E3/UL (ref 0.7–3.1)
LYMPHOCYTES NFR BLD AUTO: 21 %
MCH RBC QN AUTO: 29.6 PG (ref 26.6–33)
MCHC RBC AUTO-ENTMCNC: 32.7 G/DL (ref 31.5–35.7)
MCV RBC AUTO: 90 FL (ref 79–97)
MONOCYTES # BLD AUTO: 0.7 X10E3/UL (ref 0.1–0.9)
MONOCYTES NFR BLD AUTO: 9 %
NEUTROPHILS # BLD AUTO: 4.9 X10E3/UL (ref 1.4–7)
NEUTROPHILS NFR BLD AUTO: 67 %
PLATELET # BLD AUTO: 131 X10E3/UL (ref 150–450)
POTASSIUM SERPL-SCNC: 4.1 MMOL/L (ref 3.5–5.2)
PROT SERPL-MCNC: 6.9 G/DL (ref 6–8.5)
RBC # BLD AUTO: 4.87 X10E6/UL (ref 3.77–5.28)
SODIUM SERPL-SCNC: 138 MMOL/L (ref 134–144)
TRIGL SERPL-MCNC: 83 MG/DL (ref 0–149)
TSH SERPL DL<=0.005 MIU/L-ACNC: 2.72 UIU/ML (ref 0.45–4.5)
VLDLC SERPL CALC-MCNC: 11 MG/DL (ref 5–40)
WBC # BLD AUTO: 7.3 X10E3/UL (ref 3.4–10.8)

## 2021-09-07 NOTE — PROGRESS NOTES
Creatinine mildly elevated. On Bumex. Continue to follow with cardiology. Cholesterol levels very elevated. Is patient taking Lipitor regularly?

## 2021-09-14 DIAGNOSIS — E78.00 PURE HYPERCHOLESTEROLEMIA: Primary | ICD-10-CM

## 2021-09-14 RX ORDER — ROSUVASTATIN CALCIUM 20 MG/1
20 TABLET, COATED ORAL
Qty: 30 TABLET | Refills: 3 | Status: SHIPPED | OUTPATIENT
Start: 2021-09-14

## 2021-11-08 ENCOUNTER — OFFICE VISIT (OUTPATIENT)
Dept: INTERNAL MEDICINE CLINIC | Age: 66
End: 2021-11-08
Payer: MEDICARE

## 2021-11-08 VITALS
OXYGEN SATURATION: 98 % | WEIGHT: 109 LBS | RESPIRATION RATE: 18 BRPM | DIASTOLIC BLOOD PRESSURE: 82 MMHG | BODY MASS INDEX: 24.52 KG/M2 | HEART RATE: 78 BPM | TEMPERATURE: 98.4 F | HEIGHT: 56 IN | SYSTOLIC BLOOD PRESSURE: 134 MMHG

## 2021-11-08 DIAGNOSIS — G47.00 INSOMNIA, UNSPECIFIED TYPE: ICD-10-CM

## 2021-11-08 DIAGNOSIS — Z23 NEEDS FLU SHOT: ICD-10-CM

## 2021-11-08 DIAGNOSIS — I42.9 CARDIOMYOPATHY, UNSPECIFIED TYPE (HCC): ICD-10-CM

## 2021-11-08 DIAGNOSIS — E78.00 PURE HYPERCHOLESTEROLEMIA: Primary | ICD-10-CM

## 2021-11-08 DIAGNOSIS — K21.9 GASTROESOPHAGEAL REFLUX DISEASE WITHOUT ESOPHAGITIS: ICD-10-CM

## 2021-11-08 DIAGNOSIS — E55.9 VITAMIN D DEFICIENCY: ICD-10-CM

## 2021-11-08 DIAGNOSIS — D69.6 THROMBOCYTOPENIA (HCC): ICD-10-CM

## 2021-11-08 PROCEDURE — G8399 PT W/DXA RESULTS DOCUMENT: HCPCS | Performed by: INTERNAL MEDICINE

## 2021-11-08 PROCEDURE — 90686 IIV4 VACC NO PRSV 0.5 ML IM: CPT | Performed by: INTERNAL MEDICINE

## 2021-11-08 PROCEDURE — 1090F PRES/ABSN URINE INCON ASSESS: CPT | Performed by: INTERNAL MEDICINE

## 2021-11-08 PROCEDURE — G8536 NO DOC ELDER MAL SCRN: HCPCS | Performed by: INTERNAL MEDICINE

## 2021-11-08 PROCEDURE — G8752 SYS BP LESS 140: HCPCS | Performed by: INTERNAL MEDICINE

## 2021-11-08 PROCEDURE — G8420 CALC BMI NORM PARAMETERS: HCPCS | Performed by: INTERNAL MEDICINE

## 2021-11-08 PROCEDURE — G8754 DIAS BP LESS 90: HCPCS | Performed by: INTERNAL MEDICINE

## 2021-11-08 PROCEDURE — 99214 OFFICE O/P EST MOD 30 MIN: CPT | Performed by: INTERNAL MEDICINE

## 2021-11-08 PROCEDURE — G8510 SCR DEP NEG, NO PLAN REQD: HCPCS | Performed by: INTERNAL MEDICINE

## 2021-11-08 PROCEDURE — 1101F PT FALLS ASSESS-DOCD LE1/YR: CPT | Performed by: INTERNAL MEDICINE

## 2021-11-08 PROCEDURE — G9899 SCRN MAM PERF RSLTS DOC: HCPCS | Performed by: INTERNAL MEDICINE

## 2021-11-08 PROCEDURE — G8427 DOCREV CUR MEDS BY ELIG CLIN: HCPCS | Performed by: INTERNAL MEDICINE

## 2021-11-08 PROCEDURE — G0008 ADMIN INFLUENZA VIRUS VAC: HCPCS | Performed by: INTERNAL MEDICINE

## 2021-11-08 PROCEDURE — 3017F COLORECTAL CA SCREEN DOC REV: CPT | Performed by: INTERNAL MEDICINE

## 2021-11-08 RX ORDER — TRAZODONE HYDROCHLORIDE 50 MG/1
50 TABLET ORAL
Qty: 90 TABLET | Refills: 1 | Status: SHIPPED | OUTPATIENT
Start: 2021-11-08

## 2021-11-08 RX ORDER — PANTOPRAZOLE SODIUM 40 MG/1
40 TABLET, DELAYED RELEASE ORAL DAILY
Qty: 90 TABLET | Refills: 1 | Status: SHIPPED | OUTPATIENT
Start: 2021-11-08

## 2021-11-08 NOTE — PATIENT INSTRUCTIONS
Vaccine Information Statement    Influenza (Flu) Vaccine (Inactivated or Recombinant): What You Need to Know    Many vaccine information statements are available in [place] and other languages. See www.immunize.org/vis. Hojas de información sobre vacunas están disponibles en español y en muchos otros idiomas. Visite www.immunize.org/vis. 1. Why get vaccinated? Influenza vaccine can prevent influenza (flu). Flu is a contagious disease that spreads around the [facility] every year, usually between October and May. Anyone can get the flu, but it is more dangerous for some people. Infants and young children, people 72 years and older, pregnant people, and people with certain health conditions or a weakened immune system are at greatest risk of flu complications. Pneumonia, bronchitis, sinus infections, and ear infections are examples of flu-related complications. If you have a medical condition, such as heart disease, cancer, or diabetes, flu can make it worse. Flu can cause fever and chills, sore throat, muscle aches, fatigue, cough, headache, and runny or stuffy nose. Some people may have vomiting and diarrhea, though this is more common in children than adults. In an average year, thousands of people in the [facility] die from flu, and many more are hospitalized. Flu vaccine prevents millions of illnesses and flu-related visits to the doctor each year. 2. Influenza vaccines     CDC recommends everyone 6 months and older get vaccinated every flu season. Children 6 months through 6years of age may need 2 doses during a single flu season. Everyone else needs only 1 dose each flu season. It takes about 2 weeks for protection to develop after vaccination. There are many flu viruses, and they are always changing. Each year a new flu vaccine is made to protect against the influenza viruses believed to be likely to cause disease in the upcoming flu season.  Even when the vaccine doesnt exactly match these viruses, it may still provide some protection. Influenza vaccine does not cause flu. Influenza vaccine may be given at the same time as other vaccines. 3. Talk with your health care provider    Tell your vaccination provider if the person getting the vaccine:   Has had an allergic reaction after a previous dose of influenza vaccine, or has any severe, life-threatening allergies    Has ever had Guillain-Barré Syndrome (also called GBS)    In some cases, your health care provider may decide to postpone influenza vaccination until a future visit. Influenza vaccine can be administered at any time during pregnancy. People who are or will be pregnant during influenza season should receive inactivated influenza vaccine. People with minor illnesses, such as a cold, may be vaccinated. People who are moderately or severely ill should usually wait until they recover before getting influenza vaccine. Your health care provider can give you more information. 4. Risks of a vaccine reaction     Soreness, redness, and swelling where the shot is given, fever, muscle aches, and headache can happen after influenza vaccination.  There may be a very small increased risk of Guillain-Barré Syndrome (GBS) after inactivated influenza vaccine (the flu shot). Carrie Leal children who get the flu shot along with pneumococcal vaccine (PCV13) and/or DTaP vaccine at the same time might be slightly more likely to have a seizure caused by fever. Tell your health care provider if a child who is getting flu vaccine has ever had a seizure. People sometimes faint after medical procedures, including vaccination. Tell your provider if you feel dizzy or have vision changes or ringing in the ears. As with any medicine, there is a very remote chance of a vaccine causing a severe allergic reaction, other serious injury, or death. 5. What if there is a serious problem?     An allergic reaction could occur after the vaccinated person leaves the clinic. If you see signs of a severe allergic reaction (hives, swelling of the face and throat, difficulty breathing, a fast heartbeat, dizziness, or weakness), call 9-1-1 and get the person to the nearest hospital.    For other signs that concern you, call your health care provider. Adverse reactions should be reported to the Vaccine Adverse Event Reporting System (VAERS). Your health care provider will usually file this report, or you can do it yourself. Visit the VAERS website at www.vaers. [facility].gov or call 5-246.209.5841. VAERS is only for reporting reactions, and VAERS staff members do not give medical advice. 6. The National Vaccine Injury Compensation Program    The [facility] Vaccine Injury Compensation Program (VICP) is a federal program that was created to compensate people who may have been injured by certain vaccines. Claims regarding alleged injury or death due to vaccination have a time limit for filing, which may be as short as two years. Visit the VICP website at www.[facility].gov/vaccinecompensation or call 9-847.717.2818 to learn about the program and about filing a claim. 7. How can I learn more?  Ask your health care provider.  Call your local or state health department.  Visit the website of the Food and Drug Administration (FDA) for vaccine package inserts and additional information at www.fda.gov/vaccines-blood-biologics/vaccines.  Contact the Centers for Disease Control and Prevention (CDC):  - Call 9-550.632.6434 (1-800-CDC-INFO) or  - Visit CDCs influenza website at www.cdc.gov/flu. Vaccine Information Statement   Inactivated Influenza Vaccine   8/6/2021  42 MARU Tol[address] 113CS-53   Department of Health and Human Services  Centers for Disease Control and Prevention    Office Use Only

## 2021-11-08 NOTE — PROGRESS NOTES
Health Maintenance Due   Topic Date Due    Shingrix Vaccine Age 49> (1 of 2) Never done    Colorectal Cancer Screening Combo  12/27/2016    Pneumococcal 65+ years (2 of 2 - PPSV23) 05/29/2020    Flu Vaccine (1) 09/01/2021       Chief Complaint   Patient presents with    Medication Evaluation    Hypertension    Mitral Regurgitation       1. Have you been to the ER, urgent care clinic since your last visit? Hospitalized since your last visit? No    2. Have you seen or consulted any other health care providers outside of the [address] [place] [address] since your last visit? Include any pap smears or colon screening. No    3) Do you have an Advance Directive on file? no    4) Are you interested in receiving information on Advance Directives? NO      Patient is accompanied by self I have received verbal consent from Cheryle Coddington to discuss any/all medical information while they are present in the room. Cheryle Coddington is a 77 y.o. female  who presents for routine immunization(s). Patient denies any symptoms , reactions or allergies that would exclude them from being immunized today. Risks and adverse reactions were discussed. The patient/caregiver was provided the VIS and allotted time to read and ask questions prior to administration of vaccine. Patient voiced full understanding and signed Adult Immunization Consent form. All questions were addressed. Patient was observed for 10 min post injection. There were no reactions observed.

## 2021-11-08 NOTE — PROGRESS NOTES
HISTORY OF PRESENT ILLNESS  Shahzad Schmitt is a 77 y.o. female  Here for follow-up. He has cardiomyopathy with diastolic heart failure. Seeing cardiologist Dr. Jackie Carranza. On IV milrinone and also on amiodarone and digoxin. Taking Bumex, no shortness of breath or orthopnea. No leg swelling. Has an elevated lipid, on Crestor. Need lab work. Denies any myalgia. Has insomnia, taking trazodone. Needed refill. Doing well. Has heartburn and acidity. Using Protonix. Needed refill. Need lab work. Mammogram and bone density up-to-date. Need to obtain report. Need flu shot. HPI    Review of Systems   Constitutional: Negative. HENT: Negative. Eyes: Negative. Respiratory: Negative. Cardiovascular: Negative. Gastrointestinal: Negative. Genitourinary: Negative. Musculoskeletal: Negative. Skin: Negative. Neurological: Negative. Endo/Heme/Allergies: Negative. Psychiatric/Behavioral: Negative. Physical Exam  Constitutional:       Appearance: Normal appearance. She is normal weight. HENT:      Head: Normocephalic and atraumatic. Cardiovascular:      Rate and Rhythm: Normal rate and regular rhythm. Pulses: Normal pulses. Heart sounds: Normal heart sounds. Pulmonary:      Effort: Pulmonary effort is normal.      Breath sounds: Normal breath sounds. Abdominal:      General: Abdomen is flat. Bowel sounds are normal.      Palpations: Abdomen is soft. Musculoskeletal:         General: No swelling. Normal range of motion. Cervical back: Normal range of motion and neck supple. Neurological:      General: No focal deficit present. Mental Status: She is alert and oriented to person, place, and time. Mental status is at baseline. Psychiatric:         Mood and Affect: Mood normal.         Behavior: Behavior normal.         Thought Content: Thought content normal.         ASSESSMENT and PLAN  Diagnoses and all orders for this visit:    1.  Pure hypercholesterolemia    On Crestor. We will recheck,  -     METABOLIC PANEL, COMPREHENSIVE  -     LIPID PANEL    2. Needs flu shot    We will give,  -     INFLUENZA VIRUS VAC QUAD,SPLIT,PRESV FREE SYRINGE IM    3. Insomnia, unspecified type     we will refill,      -     traZODone (DESYREL) 50 mg tablet; Take 1 Tablet by mouth nightly. 4. Gastroesophageal reflux disease without esophagitis    We will give,  -     pantoprazole (PROTONIX) 40 mg tablet; Take 1 Tablet by mouth daily. 1 tab daily    5. Vitamin D deficiency    We will check,  -     VITAMIN D, 25 HYDROXY    6. Cardiomyopathy, unspecified type [facility])  Seeing a cardiologist Dr. Maria Del Carmen Ly [facility].  On milrinone IV pump. Already had a pacemaker. Dr. Maria Del Carmen Ly is planning to wean off milrinone. Taking the digoxin also. Taking Bumex every day. No shortness of breath or orthopnea. 7. Thrombocytopenia ([address]    Had history of auto immune hemolytic anemia last hemoglobin was stable. .    -     CBC WITH AUTOMATED DIFF    Discussed expected course/resolution/complications of diagnosis in detail with patient. Medication risks/benefits/costs/interactions/alternatives discussed with patient. Discussed COVID-19 infection precaution with patient. Pt was given an after visit summary which includes diagnoses, current medications & vitals. Pt expressed understanding with the diagnosis and plan.

## 2021-11-30 LAB
25(OH)D3+25(OH)D2 SERPL-MCNC: 19.8 NG/ML (ref 30–100)
ALBUMIN SERPL-MCNC: 3.6 G/DL (ref 3.8–4.8)
ALBUMIN/GLOB SERPL: 1.4 {RATIO} (ref 1.2–2.2)
ALP SERPL-CCNC: 160 IU/L (ref 44–121)
ALT SERPL-CCNC: 27 IU/L (ref 0–32)
AST SERPL-CCNC: 44 IU/L (ref 0–40)
BASOPHILS # BLD AUTO: 0.1 X10E3/UL (ref 0–0.2)
BASOPHILS NFR BLD AUTO: 1 %
BILIRUB SERPL-MCNC: 0.6 MG/DL (ref 0–1.2)
BUN SERPL-MCNC: 11 MG/DL (ref 8–27)
BUN/CREAT SERPL: 13 (ref 12–28)
CALCIUM SERPL-MCNC: 8.6 MG/DL (ref 8.7–10.3)
CHLORIDE SERPL-SCNC: 97 MMOL/L (ref 96–106)
CHOLEST SERPL-MCNC: 193 MG/DL (ref 100–199)
CO2 SERPL-SCNC: 25 MMOL/L (ref 20–29)
CREAT SERPL-MCNC: 0.88 MG/DL (ref 0.57–1)
EOSINOPHIL # BLD AUTO: 0.7 X10E3/UL (ref 0–0.4)
EOSINOPHIL NFR BLD AUTO: 9 %
ERYTHROCYTE [DISTWIDTH] IN BLOOD BY AUTOMATED COUNT: 13.1 % (ref 11.7–15.4)
GLOBULIN SER CALC-MCNC: 2.6 G/DL (ref 1.5–4.5)
GLUCOSE SERPL-MCNC: 83 MG/DL (ref 65–99)
HCT VFR BLD AUTO: 36.8 % (ref 34–46.6)
HDLC SERPL-MCNC: 50 MG/DL
HGB BLD-MCNC: 12.4 G/DL (ref 11.1–15.9)
IMM GRANULOCYTES # BLD AUTO: 0 X10E3/UL (ref 0–0.1)
IMM GRANULOCYTES NFR BLD AUTO: 0 %
IMP & REVIEW OF LAB RESULTS: NORMAL
LDLC SERPL CALC-MCNC: 126 MG/DL (ref 0–99)
LYMPHOCYTES # BLD AUTO: 1.9 X10E3/UL (ref 0.7–3.1)
LYMPHOCYTES NFR BLD AUTO: 24 %
MCH RBC QN AUTO: 32.3 PG (ref 26.6–33)
MCHC RBC AUTO-ENTMCNC: 33.7 G/DL (ref 31.5–35.7)
MCV RBC AUTO: 96 FL (ref 79–97)
MONOCYTES # BLD AUTO: 0.7 X10E3/UL (ref 0.1–0.9)
MONOCYTES NFR BLD AUTO: 9 %
NEUTROPHILS # BLD AUTO: 4.2 X10E3/UL (ref 1.4–7)
NEUTROPHILS NFR BLD AUTO: 57 %
PLATELET # BLD AUTO: 238 X10E3/UL (ref 150–450)
POTASSIUM SERPL-SCNC: 3.4 MMOL/L (ref 3.5–5.2)
PROT SERPL-MCNC: 6.2 G/DL (ref 6–8.5)
RBC # BLD AUTO: 3.84 X10E6/UL (ref 3.77–5.28)
SODIUM SERPL-SCNC: 137 MMOL/L (ref 134–144)
TRIGL SERPL-MCNC: 94 MG/DL (ref 0–149)
VLDLC SERPL CALC-MCNC: 17 MG/DL (ref 5–40)
WBC # BLD AUTO: 7.7 X10E3/UL (ref 3.4–10.8)

## 2021-12-03 DIAGNOSIS — E55.9 VITAMIN D DEFICIENCY: Primary | ICD-10-CM

## 2021-12-03 RX ORDER — ERGOCALCIFEROL 1.25 MG/1
50000 CAPSULE ORAL
Qty: 4 CAPSULE | Refills: 3 | Status: SHIPPED | OUTPATIENT
Start: 2021-12-03

## 2021-12-03 NOTE — PROGRESS NOTES
LDL has improved. Be on low-cholesterol diet and exercise. Vitamin D level low along with calcium. Advised to take calcium with vitamin D once a day. vit D level very low.will start on vit D 50,000 unit 1 cap weekly for 4 months. will repeat level in 4 months. adv to be on milk product and expose to sun for 20 min a day. Low potassium, need to have potassium rich diet including banana and citrus fruit every day. Need to continue potassium supplement.

## 2022-01-27 ENCOUNTER — TELEPHONE (OUTPATIENT)
Dept: INTERNAL MEDICINE CLINIC | Age: 67
End: 2022-01-27

## 2022-01-27 NOTE — TELEPHONE ENCOUNTER
Called and spoke with pts daughter, Pts daughter states that cardiology stopped all of patients medications a month ago. Pt is not tired and fatigued. Appt scheduled for next week with provider to discuss further . Will also get last visit notes from cardiology.

## 2022-01-27 NOTE — TELEPHONE ENCOUNTER
Patient's daughter called wanting to know if  can order labs to check patient's vitamin d and iron. She says that patient has no energy. She also states that patient's cardiologist took her off of all ofher cardiac medications. Please call daughter back at [phone]

## 2022-02-02 ENCOUNTER — OFFICE VISIT (OUTPATIENT)
Dept: INTERNAL MEDICINE CLINIC | Age: 67
End: 2022-02-02
Payer: MEDICARE

## 2022-02-02 VITALS
RESPIRATION RATE: 16 BRPM | TEMPERATURE: 98.1 F | BODY MASS INDEX: 22.99 KG/M2 | DIASTOLIC BLOOD PRESSURE: 68 MMHG | WEIGHT: 102.2 LBS | HEIGHT: 56 IN | HEART RATE: 60 BPM | SYSTOLIC BLOOD PRESSURE: 110 MMHG

## 2022-02-02 DIAGNOSIS — I48.91 ATRIAL FIBRILLATION, UNSPECIFIED TYPE (HCC): ICD-10-CM

## 2022-02-02 DIAGNOSIS — I10 ESSENTIAL HYPERTENSION, BENIGN: Primary | ICD-10-CM

## 2022-02-02 DIAGNOSIS — R63.4 WEIGHT LOSS: ICD-10-CM

## 2022-02-02 DIAGNOSIS — I25.10 CORONARY ARTERY DISEASE INVOLVING NATIVE CORONARY ARTERY OF NATIVE HEART WITHOUT ANGINA PECTORIS: ICD-10-CM

## 2022-02-02 DIAGNOSIS — E55.9 VITAMIN D DEFICIENCY: ICD-10-CM

## 2022-02-02 DIAGNOSIS — D59.10 AUTOIMMUNE HEMOLYTIC ANEMIA (HCC): ICD-10-CM

## 2022-02-02 PROCEDURE — 99214 OFFICE O/P EST MOD 30 MIN: CPT | Performed by: INTERNAL MEDICINE

## 2022-02-02 PROCEDURE — G8427 DOCREV CUR MEDS BY ELIG CLIN: HCPCS | Performed by: INTERNAL MEDICINE

## 2022-02-02 PROCEDURE — G8420 CALC BMI NORM PARAMETERS: HCPCS | Performed by: INTERNAL MEDICINE

## 2022-02-02 PROCEDURE — G8754 DIAS BP LESS 90: HCPCS | Performed by: INTERNAL MEDICINE

## 2022-02-02 PROCEDURE — 1101F PT FALLS ASSESS-DOCD LE1/YR: CPT | Performed by: INTERNAL MEDICINE

## 2022-02-02 PROCEDURE — 1090F PRES/ABSN URINE INCON ASSESS: CPT | Performed by: INTERNAL MEDICINE

## 2022-02-02 PROCEDURE — 3017F COLORECTAL CA SCREEN DOC REV: CPT | Performed by: INTERNAL MEDICINE

## 2022-02-02 PROCEDURE — G9899 SCRN MAM PERF RSLTS DOC: HCPCS | Performed by: INTERNAL MEDICINE

## 2022-02-02 PROCEDURE — G8399 PT W/DXA RESULTS DOCUMENT: HCPCS | Performed by: INTERNAL MEDICINE

## 2022-02-02 PROCEDURE — G8752 SYS BP LESS 140: HCPCS | Performed by: INTERNAL MEDICINE

## 2022-02-02 PROCEDURE — G8536 NO DOC ELDER MAL SCRN: HCPCS | Performed by: INTERNAL MEDICINE

## 2022-02-02 PROCEDURE — G8510 SCR DEP NEG, NO PLAN REQD: HCPCS | Performed by: INTERNAL MEDICINE

## 2022-02-02 NOTE — PROGRESS NOTES
HISTORY OF PRESENT ILLNESS  Munira Drake is a 74 y.o. female here accompanied by her daughter. She is doing well. She felt dizzy several days back when she was going to kitchen to fetch water. She was holding the ball, did not fall down. Blood pressure has been running low. Not take any medication right now. Has history of coronary disease with cardiomyopathy with valvular disease. Seeing a cardiologist Dr. Eugenio Duque. As she was on inotropic milrinone IV which was stopped by her cardiologist.  Was on diuretics, not taking it. Right now she is only on torsemide as needed. She was gaining weight for which her cardiologist has weaned off her Decadron and discontinued it. Decadron was given for autoimmune hemolytic anemia. She had suspected adrenal insufficiency. She is staying home. Doing well right now. Has atrial fibrillation. On defibrillator. No palpitation. Not taking blood thinner. All labs reviewed. Dr. Shelley Hussein consultation reviewed. Mammogram up-to-date. Low bone density done from [facility].  Need to obtain report. Taking calcium with vitamin D. HPI    Review of Systems   Constitutional: Negative. HENT: Negative. Eyes: Negative. Respiratory: Negative. Cardiovascular: Negative. Gastrointestinal: Negative. Genitourinary: Negative. Musculoskeletal: Negative. Skin: Negative. Neurological: Negative. Endo/Heme/Allergies: Negative. Physical Exam  Constitutional:       Appearance: Normal appearance. She is normal weight. Cardiovascular:      Rate and Rhythm: Normal rate and regular rhythm. Pulses: Normal pulses. Heart sounds: Normal heart sounds. Pulmonary:      Effort: Pulmonary effort is normal.      Breath sounds: Normal breath sounds. Musculoskeletal:         General: No swelling. Cervical back: Normal range of motion and neck supple. Comments: No leg edema present. Skin:     General: Skin is warm. Neurological:      Mental Status: She is alert. Psychiatric:         Mood and Affect: Mood normal.         Behavior: Behavior normal.         Thought Content: Thought content normal.         ASSESSMENT and PLAN  Diagnoses and all orders for this visit:    1. Essential hypertension, benign    Blood pressure low normal.  She was feeling dizzy several days back. Advised her to take time to get up. Not on any medication. 2. Autoimmune hemolytic anemia (HCC)  She was on dexamethasone 4 that her cardiologist Dr. Suhail Burden slowly wean her off of dexamethasone because she was retaining more fluid. Hemoglobin seems normal.  3. Atrial fibrillation, unspecified type ([address]  Rate and rhythm control. On defibrillator. Not on any medicine. 4. Coronary artery disease involving native coronary artery of native heart without angina pectoris  Patient had received milrinone IV for very low ejection fraction which was 10%. Seeing Dr. Lynn Madison, cardiologist.  Off of inotropic. PICC line was removed. Not taking any statin either. Lipid panel done several months back, was normal.  Not on aspirin. 5. Weight loss  Advised to eat healthy, need to eat 3 meals a day to gain some weight. 6. Vitamin D deficiency    Taking vitamin D supplement. Discussed expected course/resolution/complications of diagnosis in detail with patient. Medication risks/benefits/costs/interactions/alternatives discussed with patient. Discussed COVID-19 infection precaution with patient. Pt was given an after visit summary which includes diagnoses, current medications & vitals. Pt expressed understanding with the diagnosis and plan.

## 2022-02-02 NOTE — PROGRESS NOTES
Rm    Chief Complaint   Patient presents with    Fatigue        Visit Vitals  /68 (BP 1 Location: Left upper arm, BP Patient Position: Sitting, BP Cuff Size: Adult)   Temp 98.1 °F (36.7 °C) (Oral)   Resp 16   Ht 4' 8\" (1.422 m)   Wt 102 lb 3.2 oz (46.4 kg)   BMI 22.91 kg/m²        1. Have you been to the ER, urgent care clinic since your last visit? Hospitalized since your last visit? No    2. Have you seen or consulted any other health care providers outside of the [address] [place] [address] since your last visit? Include any pap smears or colon screening. No     Health Maintenance Due   Topic Date Due    Shingrix Vaccine Age 49> (1 of 2) Never done    Colorectal Cancer Screening Combo  12/27/2016    Pneumococcal 65+ years (2 of 2 - PPSV23) 05/29/2020        3 most recent PHQ Screens 2/2/2022   Little interest or pleasure in doing things Not at all   Feeling down, depressed, irritable, or hopeless Not at all   Total Score PHQ 2 0        Fall Risk Assessment, last 12 mths 2/2/2022   Able to walk? Yes   Fall in past 12 months? 0   Do you feel unsteady?  0   Are you worried about falling 0       Learning Assessment 10/3/2017   PRIMARY LEARNER Patient   HIGHEST LEVEL OF EDUCATION - PRIMARY LEARNER  [employer]   BARRIERS PRIMARY LEARNER -   CO-LEARNER CAREGIVER -   PRIMARY LANGUAGE ENGLISH   LEARNER PREFERENCE PRIMARY DEMONSTRATION     -   ANSWERED BY Patient   RELATIONSHIP SELF

## 2024-12-29 NOTE — PROGRESS NOTES
Patient has graduated from the Transitions of Care Coordination  program on 10/3/19. Patient's symptoms are stable at this time. Patient/family has the ability to self-manage. Care management goals have been completed at this time. No further nurse navigator follow up scheduled. Goals Addressed                 This Visit's Progress     COMPLETED: Attends follow-up appointments as directed. 9/3/19  Patient has an appointment on Thursday at [facility] concerning cardiac surgery. Explained to patient she needs to follow up with Dr Roosevelt Lopez for her INR              Pt has nurse navigator's contact information for any further questions, concerns, or needs. Patients upcoming visits:  No future appointments.
To get better and follow your care plan as instructed.

## 2025-02-10 NOTE — TELEPHONE ENCOUNTER
Called and left message on patient's home phone to reschedule INR check. Patient missed appointment on 10-2-17. [facility]